# Patient Record
Sex: FEMALE | Race: WHITE | NOT HISPANIC OR LATINO | Employment: OTHER | URBAN - METROPOLITAN AREA
[De-identification: names, ages, dates, MRNs, and addresses within clinical notes are randomized per-mention and may not be internally consistent; named-entity substitution may affect disease eponyms.]

---

## 2017-10-17 ENCOUNTER — OFFICE VISIT (OUTPATIENT)
Dept: DERMATOLOGY | Facility: CLINIC | Age: 72
End: 2017-10-17

## 2017-10-17 DIAGNOSIS — L57.0 AK (ACTINIC KERATOSIS): Primary | ICD-10-CM

## 2017-10-17 DIAGNOSIS — L72.0 MILIUM CYST: ICD-10-CM

## 2017-10-17 RX ORDER — MULTIVITAMIN,THER AND MINERALS
TABLET ORAL
COMMUNITY
End: 2021-05-10

## 2017-10-17 ASSESSMENT — PAIN SCALES - GENERAL: PAINLEVEL: NO PAIN (0)

## 2017-10-17 NOTE — PROGRESS NOTES
Former[facility] Dermatology Note      Dermatology Problem List:  -AK's  -SK's    Encounter Date: Oct 17, 2017    CC:   Chief Complaint   Patient presents with     Derm Problem     Deb would like to have lesions of concern onher face checked, declines full skin check at this time.         History of Present Illness:  This 72 year old female presents as a follow-up for concerning lesion on her face. The patient was last seen 08/18/14 when she had two biopsies which showed actinic keratosis and macular seborrheic keratosis. Today she has a concern about a lesion on her left eyebrow.  States that she first noticed it about 5 months ago. It will get scaly and get bigger then it sloughs off. It is not painful but it is pruritic.  It does not bleed. She also has a bump on the right side of the back of her neck. She noticed it a couple months ago but it does not bother her. She has not other concerns. She is otherwise in good health.    Past Medical History:   Past Medical History:   Diagnosis Date     Arthritis      Cataract      Glaucoma      Meningioma (H)     Neurosurgeon in [facility]     Osseous obstruction of eustachian tube (aka CHOLESTEOTOMA)      Past Surgical History:   Procedure Laterality Date     APPENDECTOMY  1986     CATARACT IOL, RT/LT       HC EXCISION OF CHOLESTEATOMA, MIDDLE EAR  1982       Social History:  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    Family History:  There is a family history of melanoma in the patient's sister.    Medications:  Current Outpatient Prescriptions   Medication Sig Dispense Refill     calcium citrate-vitamin D (CITRACAL) 315-200 MG-UNIT TABS per tablet Reported on 4/28/2017       vitamin  s/Minerals TABS Reported on 4/28/2017       Acetaminophen (TYLENOL PO) Take 325 mg by mouth PRN       omeprazole (PRILOSEC) 20 MG CR capsule Take 1 capsule (20 mg) by mouth 2 times daily 60 capsule 3        Allergies   Allergen Reactions      Clindamycin Itching and Rash         Review of Systems:  -As per HPI  -Constitutional: The patient denies fatigue, fevers, chills, unintended weight loss, and night sweats.  -HEENT: Patient denies nonhealing oral sores.    Physical exam:  There were no vitals taken for this visit.  -This is a well developed, well-nourished female in no acute distress, in a pleasant mood.    -Focused examination of the face and neck was performed.  - There is an erythematous macule with overyling adherent scale on the left eyebrow.  -There is a white 1 mm papule on the right back of neck just in the hairline.   -No other lesions of concern on areas examined.     Impression/Plan:  1. Actinic keratosis: one spot on left eyebrow    Cryotherapy procedure note: After verbal consent and discussion of risks and benefits including but no limited to dyspigmentation/scar, blister, and pain, 1 was(were) treated with 1-2mm freeze border for 2 cycles with liquid nitrogen. Post cryotherapy instructions were provided.     2. Milium: one spot on the back of the neck. Patient would like this removed.    Expressed the cyst - after informed verbal consent, the lesion was swabbed with etoh pad, surface was nicked with a 30 gauge needle, and contents were gently expressed with cotton swabs.  Pt tolerated without complication.          Follow-up prn for new or changing lesions.         Staff Involved:  Mauri Jacobson, MS4 scribed for Dr. Sharp    The medical student acted as a scribe with respect to this patient.  I have performed all the key elements of the history and physical, as well as all procedures.    Christiano Sharp MD  Dermatology Attending

## 2017-10-17 NOTE — LETTER
10/17/2017       RE: Deb Nelson  [address] [facility] [address]     Dear Colleague,    Thank you for referring your patient, Deb Nelson, to the [facility] DERMATOLOGY at [facility]. Please see a copy of my visit note below.    [facility] Dermatology Note      Dermatology Problem List:  -AK's  -SK's    Encounter Date: Oct 17, 2017    CC:   Chief Complaint   Patient presents with     Derm Problem     Deb would like to have lesions of concern onher face checked, declines full skin check at this time.         History of Present Illness:  This 72 year old female presents as a follow-up for concerning lesion on her face. The patient was last seen 08/18/14 when she had two biopsies which showed actinic keratosis and macular seborrheic keratosis. Today she has a concern about a lesion on her left eyebrow.  States that she first noticed it about 5 months ago. It will get scaly and get bigger then it sloughs off. It is not painful but it is pruritic.  It does not bleed. She also has a bump on the right side of the back of her neck. She noticed it a couple months ago but it does not bother her. She has not other concerns. She is otherwise in good health.    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    Family History:  There is a family history of melanoma in the patient's sister.    Medications:  Current Outpatient Prescriptions   Medication Sig Dispense Refill     calcium citrate-vitamin D (CITRACAL) 315-200 MG-UNIT TABS per  tablet Reported on 4/28/2017       vitamin  s/Minerals TABS Reported on 4/28/2017       Acetaminophen (TYLENOL PO) Take 325 mg by mouth PRN       omeprazole (PRILOSEC) 20 MG CR capsule Take 1 capsule (20 mg) by mouth 2 times daily 60 capsule 3        Allergies   Allergen Reactions     Clindamycin Itching and Rash         Review of Systems:  -As per HPI  -Constitutional: The patient denies fatigue, fevers, chills, unintended weight loss, and night sweats.  -HEENT: Patient denies nonhealing oral sores.    Physical exam:  There were no vitals taken for this visit.  -This is a well developed, well-nourished female in no acute distress, in a pleasant mood.    -Focused examination of the face and neck was performed.  - There is an erythematous macule with overyling adherent scale on the left eyebrow.  -There is a white 1 mm papule on the right back of neck just in the hairline.   -No other lesions of concern on areas examined.     Impression/Plan:  1. Actinic keratosis: one spot on left eyebrow    Cryotherapy procedure note: After verbal consent and discussion of risks and benefits including but no limited to dyspigmentation/scar, blister, and pain, 1 was(were) treated with 1-2mm freeze border for 2 cycles with liquid nitrogen. Post cryotherapy instructions were provided.     2. Milium: one spot on the back of the neck. Patient would like this removed.    Expressed the cyst - after informed verbal consent, the lesion was swabbed with etoh pad, surface was nicked with a 30 gauge needle, and contents were gently expressed with cotton swabs.  Pt tolerated without complication.          Follow-up prn for new or changing lesions.         Staff Involved:  Mauri Jacobson, MS4 scribed for Dr. Sharp    The medical student acted as a scribe with respect to this patient.  I have performed all the key elements of the history and physical, as well as all procedures.    Christiano Sharp MD  Dermatology Attending

## 2017-10-17 NOTE — NURSING NOTE
Dermatology Rooming Note    Deb Nelson's goals for this visit include:   Chief Complaint   Patient presents with     Derm Problem     Deb would like to have lesions of concern onher face checked, declines full skin check at this time.     Nicolasa Monet LPN

## 2017-10-17 NOTE — MR AVS SNAPSHOT
After Visit Summary   10/17/2017    Deb Nelson    MRN: [id number]           Patient Information     Date Of Birth          1945        Visit Information        Provider Department      10/17/2017 8:30 AM Christiano Sharp MD [facility]        Today's Diagnoses     AK (actinic keratosis)    -  1    Milium cyst           Follow-ups after your visit        Who to contact     Please call your clinic at [phone] to:    Ask questions about your health    Make or cancel appointments    Discuss your medicines    Learn about your test results    Speak to your doctor   If you have compliments or concerns about an experience at your clinic, or if you wish to file a complaint, please contact [facility] Patient Relations at [phone] or email us at [email or web address]         Additional Information About Your Visit        MyChart Information     [employer] gives you secure access to your electronic health record. If you see a primary care provider, you can also send messages to your care team and make appointments. If you have questions, please call your primary care clinic.  If you do not have a primary care provider, please call [phone] and they will assist you.      [employer] is an electronic gateway that provides easy, online access to your medical records. With [employer], you can request a clinic appointment, read your test results, renew a prescription or communicate with your care team.     To access your existing account, please contact your [facility] or call [phone] for assistance.        Care EveryWhere ID     This is your Care EveryWhere ID. This could be used by other organizations to access your [place] medical records  [id number]         Blood Pressure from Last 3 Encounters:   12/23/16 115/84   11/02/16 147/83   12/23/15 127/74    Weight from Last 3 Encounters:   12/23/16 70.8 kg (156 lb)    11/02/16 67.8 kg (149 lb 6.4 oz)   12/23/15 69.6 kg (153 lb 6.4 oz)              We Performed the Following     DESTRUCT PREMALIGNANT LESION, FIRST        Primary Care Provider Office Phone # Fax #    Cassandra Goddard [phone]       [address] [address]        Equal Access to Services     LEIA MINOR : Hadii aad ku hadasho Soomaali, waaxda luqadaha, qaybta kaalmada adeegyada, waxay idiin hayaan adeeg kharash la'aan . So [facility] [phone].    ATENCIÓN: Si habla esptod, tiene a das disposición servicios gratuitos de asistencia lingüística. Shmuel al [phone].    We comply with applicable federal civil rights laws and [place] laws. We do not discriminate on the basis of race, color, national origin, age, disability, sex, sexual orientation, or gender identity.            Thank you!     Thank you for choosing [facility] DERMATOLOGY  for your care. Our goal is always to provide you with excellent care. Hearing back from our patients is one way we can continue to improve our services. Please take a few minutes to complete the written survey that you may receive in the mail after your visit with us. Thank you!             Your Updated Medication List - Protect others around you: Learn how to safely use, store and throw away your medicines at www.disposemymeds.org.          This list is accurate as of: 10/17/17 11:59 PM.  Always use your most recent med list.                   Brand Name Dispense Instructions for use Diagnosis    calcium citrate-vitamin D 315-200 MG-UNIT Tabs per tablet    CITRACAL     Reported on 4/28/2017        omeprazole 20 MG CR capsule    priLOSEC    60 capsule    Take 1 capsule (20 mg) by mouth 2 times daily    Dyspepsia       TYLENOL PO      Take 325 mg by mouth PRN        vitamin  s/Minerals Tabs      Reported on 4/28/2017

## 2017-10-20 PROBLEM — L72.0 MILIUM CYST: Status: ACTIVE | Noted: 2017-10-20

## 2017-10-20 PROBLEM — L57.0 AK (ACTINIC KERATOSIS): Status: ACTIVE | Noted: 2017-10-20

## 2017-11-20 DIAGNOSIS — R10.13 DYSPEPSIA: ICD-10-CM

## 2017-11-20 NOTE — TELEPHONE ENCOUNTER
Request for medication refill:    Date of last visit at clinic: 12/23/16    Please complete refill if appropriate and CLOSE ENCOUNTER.    Closing the encounter signifies the refill is complete.    If refill has been denied, please complete the smart phrase .smirefuse and route it to the [facility] RN TRIAGE pool to inform the patient and the pharmacy.    Diana Anderson

## 2017-11-24 NOTE — TELEPHONE ENCOUNTER
Request for medication refill:    Date of last visit at clinic: 12/23/2016    Please complete refill if appropriate and CLOSE ENCOUNTER.    Closing the encounter signifies the refill is complete.    If refill has been denied, please complete the smart phrase .smirefuse and route it to the [facility] RN TRIAGE pool to inform the patient and the pharmacy.    Austen Echeverria, CMA

## 2017-12-01 ENCOUNTER — OFFICE VISIT (OUTPATIENT)
Dept: FAMILY MEDICINE | Facility: CLINIC | Age: 72
End: 2017-12-01

## 2017-12-01 ENCOUNTER — OFFICE VISIT (OUTPATIENT)
Dept: PHARMACY | Facility: CLINIC | Age: 72
End: 2017-12-01

## 2017-12-01 VITALS
HEART RATE: 62 BPM | SYSTOLIC BLOOD PRESSURE: 143 MMHG | OXYGEN SATURATION: 98 % | TEMPERATURE: 97.8 F | RESPIRATION RATE: 16 BRPM | DIASTOLIC BLOOD PRESSURE: 87 MMHG

## 2017-12-01 DIAGNOSIS — L30.9 DERMATITIS: ICD-10-CM

## 2017-12-01 DIAGNOSIS — Z00.00 WELLNESS EXAMINATION: Primary | ICD-10-CM

## 2017-12-01 DIAGNOSIS — Z00.00 MEDICARE ANNUAL WELLNESS VISIT, SUBSEQUENT: ICD-10-CM

## 2017-12-01 DIAGNOSIS — Z00.00 PREVENTATIVE HEALTH CARE: Primary | ICD-10-CM

## 2017-12-01 RX ORDER — TRIAMCINOLONE ACETONIDE 1 MG/G
OINTMENT TOPICAL
Qty: 80 G | Refills: 0 | Status: SHIPPED | OUTPATIENT
Start: 2017-12-01 | End: 2019-08-28

## 2017-12-01 NOTE — MR AVS SNAPSHOT
After Visit Summary   12/1/2017    Deb Nelson    MRN: [id number]           Patient Information     Date Of Birth          1945        Visit Information        Provider Department      12/1/2017 9:20 AM Terrell Mijares MD [facility]        Today's Diagnoses     Dermatitis    -  1      Care Instructions    Take Allegra 180mg per day (check box) and use TMC ointment 3 times per day for 2 weeks.  If the rash is not improved then call us and we will refer to Derm.  Take Vitiman D 2000 IU daily during the winter.  Get a PCV-13 shot and a shingles shot.            Follow-ups after your visit        Who to contact     Please call your clinic at [phone] to:    Ask questions about your health    Make or cancel appointments    Discuss your medicines    Learn about your test results    Speak to your doctor   If you have compliments or concerns about an experience at your clinic, or if you wish to file a complaint, please contact [facility] Patient Relations at [phone] or email us at [email or web address]         Additional Information About Your Visit        MyChart Information     [employer] gives you secure access to your electronic health record. If you see a primary care provider, you can also send messages to your care team and make appointments. If you have questions, please call your primary care clinic.  If you do not have a primary care provider, please call [phone] and they will assist you.      [employer] is an electronic gateway that provides easy, online access to your medical records. With [employer], you can request a clinic appointment, read your test results, renew a prescription or communicate with your care team.     To access your existing account, please contact your [facility] or call [phone] for assistance.        Care EveryWhere ID     This is your Care EveryWhere ID. This could  be used by other organizations to access your [place] medical records  [id number]        Your Vitals Were     Pulse Temperature Respirations Pulse Oximetry          62 97.8  F (36.6  C) (Oral) 16 98%         Blood Pressure from Last 3 Encounters:   12/01/17 143/87   12/23/16 115/84   11/02/16 147/83    Weight from Last 3 Encounters:   12/23/16 70.8 kg (156 lb)   11/02/16 67.8 kg (149 lb 6.4 oz)   12/23/15 69.6 kg (153 lb 6.4 oz)              Today, you had the following     No orders found for display         Today's Medication Changes          These changes are accurate as of: 12/1/17  9:27 AM.  If you have any questions, ask your nurse or doctor.               Start taking these medicines.        Dose/Directions    triamcinolone 0.1 % ointment   Commonly known as:  KENALOG   Used for:  Dermatitis   Started by:  Terrell Mijares MD        Apply sparingly to affected area three times daily for 14 days.   Quantity:  80 g   Refills:  0            Where to get your medicines      These medications were sent to [facility]/pharmacy #[id number] - [place], [place] - [address], [place] [address]     Phone:  [phone]     triamcinolone 0.1 % ointment                Primary Care Provider Office Phone # Fax #    Cassandra Goddard [phone]       [address] [address]        Equal Access to Services     LEIA MINOR AH: Hadii analilia coloradoo Sobehzad, waaxda luqadaha, qaybta kaalmada franklin, waxangel yaa bustillo. So [facility] [phone].    ATENCIÓN: Si habla español, tiene a das disposición servicios gratuitos de asistencia lingüística. Llame al [phone].    We comply with applicable federal civil rights laws and [place] laws. We do not discriminate on the basis of race, color, national origin, age, disability, sex, sexual orientation, or gender identity.            Thank you!     Thank you for choosing [facility]  for your care. Our goal is always to provide you with excellent care. Hearing back from our patients is one way we can continue to improve our services. Please take a few minutes to complete the written survey that you may receive in the mail after your visit with us. Thank you!             Your Updated Medication List - Protect others around you: Learn how to safely use, store and throw away your medicines at www.disposemymeds.org.          This list is accurate as of: 12/1/17  9:27 AM.  Always use your most recent med list.                   Brand Name Dispense Instructions for use Diagnosis    calcium citrate-vitamin D 315-200 MG-UNIT Tabs per tablet    CITRACAL     Reported on 4/28/2017        omeprazole 20 MG CR capsule    priLOSEC    60 capsule    Take 1 capsule (20 mg) by mouth 2 times daily    Dyspepsia       triamcinolone 0.1 % ointment    KENALOG    80 g    Apply sparingly to affected area three times daily for 14 days.    Dermatitis       TYLENOL PO      Take 325 mg by mouth PRN        vitamin  s/Minerals Tabs      Reported on 4/28/2017

## 2017-12-01 NOTE — PROGRESS NOTES
Clinical Pharmacy Note     Deb was referred by Dr. Bradford for pharmacy services for [facility]        MEDICATION REVIEW:  Discussed all medication indications, dosage and effectiveness, adverse effects, and adherence with patient/caregiver.    Pt had meds with them: no  Pt had med list with them: no  Pt was knowledgeable about meds: yes  Medications set up by: self  Medications administered by someone else (e.g., LTC[facility]): No  Pt uses a medication box or automated dispenser: no  Called pharmacy to obtain or clarify med list:  no  Called [facility] or [facility] to obtain or clarify med list:  no    Medication Discrepancies  Medications on EMR med list that pt is NOT taking:  yes, pt is no longer taking calcium citrate-vitamin D nor is she taking multivitamin  Medications pt IS taking that are NOT on EMR med list (e.g., from specialist, hospital): none  OTC meds/ dietary supplements pt taking on own that are NOT on EMR med list:  none  Dosage listed differently than how patient is taking: none  Frequency listed differently than how patient is taking: none  Duplicate medication on list (two occurrences of the same medication):  none  TOTAL NUMBER OF MEDICATION DISCREPANCIES:  2  ______________________________________________________________________        Patient reported being compliant all of the time   Patient reports no side effects.    Subjective:  Deb is here today for a routine wellness visit and to be seen for possible chiggers. She reports no concerns with her medications at this time. 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          1)  Dyspepsia ;     Deb states she will use omeprazole as needed for a few weeks if she is having symptoms of acid reflux. She will then taper herself off of the medication. She reports last using the medication back in September 2017.     2) General Health ;     Deb reports no concerns with her general health at this time other  than discussing with the physician about a rash (possibly d/t chiggers) that has developed. She reports that she uses acetaminophen just as needed for aches and pains.    Objective:    Patient Active Problem List   Diagnosis     H/O senile osteopenia     Dyspepsia     History of cataract     Generalized osteoarthritis of multiple sites     History of meningioma of the brain     Recurrent cholesteatoma of postmastoidectomy cavity, unspecified laterality     AK (actinic keratosis)     Milium cyst       Social History   Substance Use Topics     Smoking status: Former Smoker     Packs/day: 2.00     Years: 25.00     Quit date: 7/28/1985     Smokeless tobacco: Never Used     Alcohol use 1.0 oz/week     2 Standard drinks or equivalent per week      Comment: 7 per week       Current Outpatient Prescriptions   Medication     omeprazole (PRILOSEC) 20 MG CR capsule     Acetaminophen (TYLENOL PO)     triamcinolone (KENALOG) 0.1 % ointment     calcium citrate-vitamin D (CITRACAL) 315-200 MG-UNIT TABS per tablet     vitamin  s/Minerals TABS     No current facility-administered medications for this visit.        Immunization History   Administered Date(s) Administered     HEPA 05/28/2009, 10/21/2009     HepB 05/28/2009, 10/21/2009, 04/13/2010     Influenza (High Dose) 3 valent vaccine 11/02/2016     Influenza (IIV3) PF 09/16/2013     Bruneian Encephalitis IM 07/02/2012, 07/27/2012     OPV, monovalent, unspecified 05/28/2009     Pneumococcal (PCV 13) 11/02/2016     Pneumococcal 23 valent 07/28/2010     TDAP Vaccine (Boostrix) 11/02/2016     Tdap (Adacel,Boostrix) 04/03/2006     Typhoid IM 05/28/2009     Zoster vaccine, live 09/16/2013       Current Outpatient Prescriptions   Medication     omeprazole (PRILOSEC) 20 MG CR capsule     Acetaminophen (TYLENOL PO)     triamcinolone (KENALOG) 0.1 % ointment     calcium citrate-vitamin D (CITRACAL) 315-200 MG-UNIT TABS per tablet     vitamin  s/Minerals TABS     No current  facility-administered medications for this visit.          Drug Therapy Assessment:  Drug therapy problems identified:   1.) Dyspepsia       Status:  controlled       DTP:  None     Deb appears very knowledgeable about the use of her omeprazole. She understands long-term use of the medication is not recommended due to increased risk of bone fractures. We discussed using alternative therapies such as ranitidine and calcium carbonate. The calcium carbonate would give her the added benefit of calcium supplementation as well. Although she does not have any concerns with her bone health at this time, we encouraged her to think about these options in the future.     2.) General Health    Deb should continue using acetaminophen as needed for treatment of aches and pains. Overall, she appears to have no concerns with her medications at this time.          All medications were reviewed and found to be indicated, effective, safe and convenient unless drug therapy problem identified as described above.        Drug Therapy Plan and Follow up:    Plan  1.) Continue using omeprazole 20 mg as needed for symptoms of dyspepsia. But, consider alternatives such as ranitidine, 75mg, 30 minutes before a meal as needed or calcium carbonate (Tums), 2-3 tablets at onset of symptoms.   2.) Continue using acetaminophen, 325 mg, as needed for general aches and pains.       Follow up: To be determined by PCP  Patient was provided with written instructions/medication list via provider AVS        Options for treatment and/or follow-up care were reviewed with the patient. Patient was engaged and actively involved in the decision making process.  Deb Nelson verbalized understanding of the options discussed and was satisfied with the final plan.      Dr. Terrell Mijares was provided my action  in clinic today and Dr. Mijares was available for supervision during this visit and is the authorizing prescriber for this visit through the  pharmacist collaborative practice agreement.      Note was scribed today for Dr. Shiv Gordon Pharmacy Student        Lilia Kaye [facility] student  Medical conditions reviewed: 1  Medications reviewed: 4  DTP identified: 0  Time spent: 15 minutes  Level of service:1        I was present for the entire pharmacy encounter today.    Pt was knowledgable of her medications and provided us an accurate picture of the meds she does and does not take.  Listed above.  I agree with the assessment and plan listed above.    Eron AngelD.

## 2017-12-01 NOTE — PROGRESS NOTES
Medicare Annual Wellness Visit         HPI     This 72 year old female presents as an established patient  Cassandra Goddard who presents for an subsequent Medicare Wellness Exam.  Patient also reports some itching of left chest in patches over the last several weeks since clearing brush in the area.  Using benedryl gel without success and also noticing some fullness in the skin ?lymph nodes in the area with no pain or burning.  No other areas of rash.  No vesicular and did not look like poison ivy.        Patient Active Problem List   Diagnosis     H/O senile osteopenia     Dyspepsia     History of cataract     Generalized osteoarthritis of multiple sites     History of meningioma of the brain     Recurrent cholesteatoma of postmastoidectomy cavity, unspecified laterality     AK (actinic keratosis)     Milium cyst       Past Medical History:   Diagnosis Date     Arthritis      Cataract      Glaucoma      Meningioma (H)     Neurosurgeon in [facility]     Osseous obstruction of eustachian tube (aka CHOLESTEOTOMA)         Family History   Problem Relation Age of Onset     Colon Cancer Mother      Breast Cancer Mother      CANCER Mother      Psychotic Disorder Other      mother, depression; father schizophrenia     Dementia Brother 82     Melanoma Sister      Skin Cancer No family hx of          Past Surgical History:   Procedure Laterality Date     APPENDECTOMY  1986     CATARACT IOL, RT/LT       HC EXCISION OF CHOLESTEATOMA, MIDDLE EAR  1982       Reviewed no other significant FH    Family History and past Medical History reviewed and it is unchanged/updated.       Review of Systems       Constitutional:   fevers, night sweats or unintentional weight change ?  NO      Eyes:   vision change, diplopia or red eyes?  NO      Ears, Nose, Mouth, Throat:   tinnitus or hearing change,  epistaxis or nasal discharge,  oral lesions, throat pain ?  NO      Neck:   stiffness?  NO           Cardiovascular:   chest pain, palpitations,  or pain with walking, orthopnea or PND?  NO   Breasts:  Any bumps or unusual discharge?     NO         Respiratory:   dyspnea, cough, shortness of breath or wheezing?  NO         GI:   nausea, vomiting, diarrhea or constipation,  abdominal pain ?  NO         :   change in urine,  dysuria or hematuria,  sexual dysfunction ?  NO        Musculoskeletal:   joint or muscle pain or swelling?  NO            Skin:   concerning lesions or moles?  NO           Nervous System:   loss of strength or sensation,  numbness or tingling,  tremor,  dizziness,  headache?  NO   Endocrine/Homone:   polyuria or polydipsia,  temperature intolerance?  NO            Blood and Lymphnodes:   concerning bumps,  bleeding problems?  NO            Allergy:   environmental allergies?  NO            Mental Health:   depression or anxiety,  sleep problems?  NO               Medical Care     Have you been to an ER or a hospital in the last year? No  What other specialists or organizations are involved in your medical care?  no  Current providers sharing in care for this patient include:  Patient Care Team:  Cassandra Goddard MD as PCP - General (Student in organized health care education/training program)         Social History     Social History   Substance Use Topics     Smoking status: Former Smoker     Packs/day: 2.00     Years: 25.00     Quit date: 7/28/1985     Smokeless tobacco: Never Used     Alcohol use 1.0 oz/week     2 Standard drinks or equivalent per week      Comment: 7 per week     Marital Status:Partnered  Who lives in your household? wife  Does your home have any of the following safety concerns? Loose rugs in the hallway, no grab bars in the bathroom, no handrails on the stairs or have poorly lit areas?  No  Do you feel threatened or controlled by a partner, ex-partner or anyone in your life? No  Has anyone hurt you physically, for example by pushing, hitting, slapping or kicking you   or forcing you to have sex? No  Do you need  help with the phone, transportation, shopping, preparing meals, housework, laundry, medications or managing money? No   Have you noticed any hearing difficulties? No      Risk Behaviors and Healthy Habits     How many servings of fruits and vegetables do you eat a day? several  How often do you exercise and what do you do? 30 minutes 3x a week  Do you frequently ride without a seatbelt? No  Do you use tobacco?  No  Do you use any other drugs? No         Do you use alcohol?Yes  Number of drinks per day 2  Number of drinking days a week 5      Today's PHQ-2 Score: 0    Sexual Health     Are you sexually active?  Yes    If yes, with men, women, or both? Female  If yes, do you more than one current partner?No  If yes, are you using condoms?  No  Have you had any sexually transmitted infections? No   Any sexual concerns? No     FOR WOMEN  What year did you stop having periods? Years ago  Any vaginal bleeding in the last year? No  Have you ever had an abnormal Pap smear? No    FUNCTIONAL ABILITY/SAFETY SCREENING     Fall Risk Assessment Today: Fallen 2 or more times in the past year?: No  Any fall with injury in the past year?: Yes  Timed Up and Go Test (>13.5 is fall risk; contact physician) : 9    Hearing evaluation if done: No    EVALUATION OF COGNITIVE FUNCTION     Mood/affect:Normal  Appearance:Normal  Family member/caregiver input: none    Mini Cog Scoring   5 points   Clock Draw Test result:  Normal      SCREENING FOR PREVENTION and EARLY DETECTION     ECG (if done)not performed    Corrected Visual acuity: L 20/20   R 20/20    CV Risk based on Pooled Cohort Risk:  The ASCVD Risk score (Kimberlybing COHEN Jr, et al., 2013) failed to calculate for the following reasons:    Cannot find a previous HDL lab    Cannot find a previous total cholesterol lab  7.5% or so    Advanced Directives: Discussed and patient desires to not addressed at this visit.      Immunization History   Administered Date(s) Administered     HEPA 05/28/2009,  10/21/2009     HepB 05/28/2009, 10/21/2009, 04/13/2010     Influenza (High Dose) 3 valent vaccine 11/02/2016     Influenza (IIV3) PF 09/16/2013     Nigerien Encephalitis IM 07/02/2012, 07/27/2012     OPV, monovalent, unspecified 05/28/2009     Pneumococcal (PCV 13) 11/02/2016     Pneumococcal 23 valent 07/28/2010     TDAP Vaccine (Boostrix) 11/02/2016     Tdap (Adacel,Boostrix) 04/03/2006     Typhoid IM 05/28/2009     Zoster vaccine, live 09/16/2013       Reviewed Immunization Record Today  Pneumoccocal Vaccine: Yes  Varicella Vaccine: Yes  TDaP:Yes         Physical Exam     Vitals: /87  Pulse 62  Temp 97.8  F (36.6  C) (Oral)  Resp 16  SpO2 98%  BMI= There is no height or weight on file to calculate BMI.  GENERAL APPEARANCE: healthy, alert and no distress  EYES: Eyes grossly normal to inspection, PERRL and conjunctivae and sclerae normal  NECK: symmetrical neck but has some fullness on the left close to rash  MS: no musculoskeletal defects are noted and gait is age appropriate without ataxia  SKIN: no suspicious lesions or rashes with exception of some erythematous patches on left neck  NEURO: Normal strength and tone, sensory exam grossly normal, mentation intact and speech normal  PSYCH: mentation appears normal and affect normal/bright        Assessment and Plan   subsequent   Medicare Wellness Exam  1. Health Care Maintenance: Normal Physical Exam  2. Dermatitis  PLAN:  1. Pt will have PCV-13 and Shingles shot done at pharmacy   2. Will try TMC oint and allegra and will return if not improving.      Deb was seen today for wellness visit.    Diagnoses and all orders for this visit:    Wellness examination  -     SCREENING TEST, PURE TONE, AIR ONLY  -     SCREENING, VISUAL ACUITY, QUANTITATIVE, BILAT    Dermatitis  -     triamcinolone (KENALOG) 0.1 % ointment; Apply sparingly to affected area three times daily for 14 days.          Options for treatment and follow-up care were reviewed with the Deb LAY  Barnsteiner and/or guardian engaged in the decision making process and verbalized understanding of the options discussed and agreed with the final plan.    JEAN CLAUDE CAI

## 2017-12-01 NOTE — Clinical Note
I have completed my note for NASIMA. Please let me know if you have any other additional recommendations on how to improve my note.  Thanks,  Lilia Gordon Student Pharmacist

## 2017-12-01 NOTE — PATIENT INSTRUCTIONS
Take Allegra 180mg per day (check box) and use TMC ointment 3 times per day for 2 weeks.  If the rash is not improved then call us and we will refer to Derm.  Take Vitiman D 2000 IU daily during the winter.  Get a PCV-13 shot and a shingles shot.    Preventive Health Recommendations    Female Ages 65 +    Yearly exam:     See your health care provider every year in order to  o Review health changes.   o Discuss preventive care.    o Review your medicines if your doctor has prescribed any.      You no longer need a yearly Pap test unless you've had an abnormal Pap test in the past 10 years. If you have vaginal symptoms, such as bleeding or discharge, be sure to talk with your provider about a Pap test.      Every 1 to 2 years, have a mammogram.  If you are over 69, talk with your health care provider about whether or not you want to continue having screening mammograms.      Every 10 years, have a colonoscopy. Or, have a yearly FIT test (stool test). These exams will check for colon cancer.       Have a cholesterol test every 5 years, or more often if your doctor advises it.       Have a diabetes test (fasting glucose) every three years. If you are at risk for diabetes, you should have this test more often.       At age 65, have a bone density scan (DEXA) to check for osteoporosis (brittle bone disease).    Shots:    Get a flu shot each year.    Get a tetanus shot every 10 years.    Talk to your doctor about your pneumonia vaccines. There are now two you should receive - Pneumovax (PPSV 23) and Prevnar (PCV 13).    Talk to your doctor about the shingles vaccine.    Talk to your doctor about the hepatitis B vaccine.    Nutrition:     Eat at least 5 servings of fruits and vegetables each day.      Eat whole-grain bread, whole-wheat pasta and brown rice instead of white grains and rice.      Talk to your provider about Calcium and Vitamin D.     Lifestyle    Exercise at least 150 minutes a week (30 minutes a day, 5 days  a week). This will help you control your weight and prevent disease.      Limit alcohol to one drink per day.      No smoking.       Wear sunscreen to prevent skin cancer.       See your dentist twice a year for an exam and cleaning.      See your eye doctor every 1 to 2 years to screen for conditions such as glaucoma, macular degeneration and cataracts.

## 2017-12-01 NOTE — NURSING NOTE
I have performed the following screening on patient Deb Nelson:    Vision: R eye 20/20   L eye 20/20  corrective lenses: Yes  Hearing: R Ear: FAILED   L Ear: FAILED    Sees audiology for hearing loss, follow up appt is in 12/2017      TUG 9 seconds    Angie Lindsay CMA

## 2017-12-01 NOTE — MR AVS SNAPSHOT
After Visit Summary   12/1/2017    Deb Nelson    MRN: [id number]           Patient Information     Date Of Birth          1945        Visit Information        Provider Department      12/1/2017 9:00 AM [facility]        Today's [facility] health care    -  1       Follow-ups after your visit        Who to contact     Please call your clinic at [phone] to:    Ask questions about your health    Make or cancel appointments    Discuss your medicines    Learn about your test results    Speak to your doctor   If you have compliments or concerns about an experience at your clinic, or if you wish to file a complaint, please contact [facility] Patient Relations at [phone] or email us at [email or web address]         Additional Information About Your Visit        MyChart Information     [employer] gives you secure access to your electronic health record. If you see a primary care provider, you can also send messages to your care team and make appointments. If you have questions, please call your primary care clinic.  If you do not have a primary care provider, please call [phone] and they will assist you.      [employer] is an electronic gateway that provides easy, online access to your medical records. With [employer], you can request a clinic appointment, read your test results, renew a prescription or communicate with your care team.     To access your existing account, please contact your [facility] or call [phone] for assistance.        Care EveryWhere ID     This is your Care EveryWhere ID. This could be used by other organizations to access your [facility] medical records  [id number]         Blood Pressure from Last 3 Encounters:   12/01/17 143/87   12/23/16 115/84   11/02/16 147/83    Weight from Last 3 Encounters:   12/23/16 156 lb (70.8 kg)   11/02/16 149  lb 6.4 oz (67.8 kg)   12/23/15 153 lb 6.4 oz (69.6 kg)              Today, you had the following     No orders found for display         Today's Medication Changes          These changes are accurate as of: 12/1/17 11:59 PM.  If you have any questions, ask your nurse or doctor.               Start taking these medicines.        Dose/Directions    triamcinolone 0.1 % ointment   Commonly known as:  KENALOG   Used for:  Dermatitis   Started by:  Terrell Mijares MD        Apply sparingly to affected area three times daily for 14 days.   Quantity:  80 g   Refills:  0            Where to get your medicines      These medications were sent to [facility]/pharmacy #[id number] - [place], [place] - [address] [facility]  [address], [place] [address]     Phone:  [phone]     triamcinolone 0.1 % ointment                Primary Care Provider Office Phone # Fax #    Cassandra Goddard [phone]       [address] [address]        Equal Access to Services     [facility]: Hadii aad ku hadasho Soomaali, waaxda luqadaha, qaybta kaalmada adeegyada, waxangel mojica . So [facility] [phone].    ATENCIÓN: Si habla español, tiene a das disposición servicios gratuitos de asistencia lingüística. Llame al [phone].    We comply with applicable federal civil rights laws and [place] laws. We do not discriminate on the basis of race, color, national origin, age, disability, sex, sexual orientation, or gender identity.            Thank you!     Thank you for choosing [facility]  for your care. Our goal is always to provide you with excellent care. Hearing back from our patients is one way we can continue to improve our services. Please take a few minutes to complete the written survey that you may receive in the mail after your visit with us. Thank you!             Your Updated Medication List - Protect others around you: Learn how to  safely use, store and throw away your medicines at www.disposemymeds.org.          This list is accurate as of: 12/1/17 11:59 PM.  Always use your most recent med list.                   Brand Name Dispense Instructions for use Diagnosis    calcium citrate-vitamin D 315-200 MG-UNIT Tabs per tablet    CITRACAL     Reported on 4/28/2017        omeprazole 20 MG CR capsule    priLOSEC    60 capsule    Take 1 capsule (20 mg) by mouth 2 times daily    Dyspepsia       triamcinolone 0.1 % ointment    KENALOG    80 g    Apply sparingly to affected area three times daily for 14 days.    Dermatitis       TYLENOL PO      Take 325 mg by mouth PRN        vitamin  s/Minerals Tabs      Reported on 4/28/2017

## 2018-09-10 ENCOUNTER — TELEPHONE (OUTPATIENT)
Dept: DERMATOLOGY | Facility: CLINIC | Age: 73
End: 2018-09-10

## 2018-09-10 NOTE — TELEPHONE ENCOUNTER
[facility] Call Center    Phone Message    May a detailed message be left on voicemail: yes    Reason for Call: Other: PT would like a call back to schedule an appt. for changing lesion.  Please follow up with the PT.      Action Taken: Message routed to:  [facility] derm

## 2019-08-28 ENCOUNTER — OFFICE VISIT (OUTPATIENT)
Dept: FAMILY MEDICINE | Facility: CLINIC | Age: 74
End: 2019-08-28
Payer: MEDICARE

## 2019-08-28 VITALS
HEIGHT: 66 IN | HEART RATE: 57 BPM | OXYGEN SATURATION: 96 % | TEMPERATURE: 98.2 F | SYSTOLIC BLOOD PRESSURE: 114 MMHG | WEIGHT: 150.6 LBS | DIASTOLIC BLOOD PRESSURE: 67 MMHG | BODY MASS INDEX: 24.2 KG/M2

## 2019-08-28 DIAGNOSIS — Z01.818 PREOP GENERAL PHYSICAL EXAM: Primary | ICD-10-CM

## 2019-08-28 ASSESSMENT — MIFFLIN-ST. JEOR: SCORE: 1203.84

## 2019-08-28 NOTE — PROGRESS NOTES
Preceptor Attestation:   Patient seen, evaluated and discussed with the resident. I have verified the content of the note, which accurately reflects my assessment of the patient and the plan of care.   Supervising Physician:  Shira Crespo MD MD

## 2019-08-28 NOTE — PROGRESS NOTES
[facility]  [address],  [address]  [place] [address]  Phone: [phone]  Fax: [phone]    8/28/2019    Adult PRE-OP Evaluation:    Deb Nelson, 1945 presents for pre-operative evaluation and assessment as requested by Dr. Nasrin Franco, prior to undergoing surgery/procedure for treatment of  Cataract .    Proposed procedure: Cataract    Date of Surgery/ Procedure: 9/3/19  Hospital/Surgical Facility: [facility]     Primary Physician: Cassandra Goddard  Type of Anesthesia Anticipated: General  History of anesthesia complications: NONE  History of  abnormal bleeding: NONE   History of blood transfusions: NO  Patient has a Health Care Directive or Living Will:  YES     Preoperative Questions   1. NO - Do you have a history of heart attack, stroke, stent, bypass or surgery on an artery in the head, neck, heart or legs?  2. NO - Do you ever have any pain or discomfort in your chest?  3. NO - Have you ever had a severe pain across the front of your chest lasting for half an hour or more?  4. NO - Do you have a history of Congestive Heart Failure?  5. NO - Are you troubled by shortness of breath when: walking on the level/ up a slight hill/ at night?  6. NO - Does your chest ever sound wheezy or whistling?  7. NO - Do you currently have a cold, bronchitis or other respiratory infection?  8. NO - Have you had a cold, bronchitis or other respiratory infection within the last 2 weeks?  9. NO - Do you usually have a cough?  10. NO - Do you sometimes get pains in the calves of your legs when you walk?  11. NO - Do you or anyone in your family have previous history of blood clots?  12. NO - Do you or does anyone in your family have a serious bleeding problem such as prolonged bleeding following surgeries or cuts?  13. NO - Have you ever had problems with anemia or been told to take iron pills?  14. NO - Have you had any abnormal blood loss such as black, tarry or  bloody stools, or abnormal vaginal bleeding?  15. NO - Have you ever had a blood transfusion?  16. NO - Have you or any of your relatives ever had problems with anesthesia?  17. NO - Do you have sleep apnea, excessive snoring or daytime drowsiness?  18. NO - Do you have any prosthetic heart valves?  19. NO - Do you have prosthetic joints?  20. NO - Is there any chance that you may be pregnant?    Patient Active Problem List   Diagnosis     H/O senile osteopenia     Dyspepsia     History of cataract     Generalized osteoarthritis of multiple sites     History of meningioma of the brain     Recurrent cholesteatoma of postmastoidectomy cavity, unspecified laterality     AK (actinic keratosis)     Milium cyst         Current Outpatient Medications on File Prior to Visit:  Acetaminophen (TYLENOL PO) Take 325 mg by mouth PRN   calcium citrate-vitamin D (CITRACAL) 315-200 MG-UNIT TABS per tablet Reported on 2017   vitamin  s/Minerals TABS Reported on 2017     No current facility-administered medications on file prior to visit.     OTC products: None, except as noted above    Allergies   Allergen Reactions     Clindamycin Itching and Rash     Latex Allergy: NO    Social History     Socioeconomic History     Marital status: Single     Spouse name: None     Number of children: None     Years of education: None     Highest education level: None   Occupational History     None   Social Needs     Financial resource strain: None     Food insecurity:     Worry: None     Inability: None     Transportation needs:     Medical: None     Non-medical: None   Tobacco Use     Smoking status: Former Smoker     Packs/day: 2.00     Years: 25.00     Pack years: 50.00     Last attempt to quit: 1985     Years since quittin.1     Smokeless tobacco: Never Used   Substance and Sexual Activity     Alcohol use: Yes     Alcohol/week: 1.0 oz     Types: 2 Standard drinks or equivalent per week     Comment: 7 per week     Drug use:  "No     Sexual activity: Yes     Partners: Female   Lifestyle     Physical activity:     Days per week: None     Minutes per session: None     Stress: None   Relationships     Social connections:     Talks on phone: None     Gets together: None     Attends Scientologist service: None     Active member of club or organization: None     Attends meetings of clubs or organizations: None     Relationship status: None     Intimate partner violence:     Fear of current or ex partner: None     Emotionally abused: None     Physically abused: None     Forced sexual activity: None   Other Topics Concern     Parent/sibling w/ CABG, MI or angioplasty before 65F 55M? 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    EXAM:     Patient Vitals for the past 24 hrs:   BP Temp Temp src Pulse SpO2 Height Weight   08/28/19 1002 114/67 98.2  F (36.8  C) Oral 57 96 % 1.683 m (5' 6.25\") 68.3 kg (150 lb 9.6 oz)     Body mass index is 24.12 kg/m .  GENERAL: healthy, alert and no distress  EYES: Eyes grossly normal to inspection, extraocular movements - intact, and PERRL  HENT: ear canals- normal; TMs- normal; Nose- normal; Mouth- no ulcers, no lesions  NECK: no tenderness, no adenopathy, no asymmetry, no masses, no stiffness; thyroid- normal to palpation  RESP: lungs clear to auscultation - no rales, no rhonchi, no wheezes  CV: regular rates and rhythm, normal S1 S2, no S3 or S4 and no murmur, no click or rub -  ABDOMEN: soft, no tenderness, no  hepatosplenomegaly, no masses, normal bowel sounds  MS: extremities- no gross deformities noted, no edema  SKIN: no suspicious lesions, no rashes  NEURO: strength and tone- normal, sensory exam- grossly normal, mentation- intact, speech- normal, reflexes- symmetric  BACK: " no CVA tenderness, no paralumbar tenderness  PSYCH: Alert and oriented times 3; speech- coherent , normal rate and volume; able to articulate logical thoughts  LYMPHATICS: ant. cervical- normal, post. cervical- normal    DIAGNOSTICS:      No labs or EKG required for low risk surgery (cataract, skin procedure, breast biopsy, etc)    RISK ASSESSMENT:     Cardiovascular Risk:  -Patient is able to participate in strenuous activities without chest pain.  -The patient does not have chest pain with exertion.  -Patient does not have a history of congestive heart failure.    -The patient does not have a history of stroke and does not have a history of valvular disease.    Pulmonary Risk:  -In terms of risk factors for pulmonary complication, the patient has no risk factors    Perioperative Complications:  -The patient does not have a history of bleeding or clotting problems in the past.    -The patient has not had complications from surgeries.    -The patient does not have a family history of any anesthesia or surgical complications.      IMPRESSION:   Reason for surgery/procedure: Cataract    The proposed surgical procedure is considered LOW risk.    For above listed surgery and anesthesia:   Patient is at low risk for surgery/procedure and perioperative/procedure complications.    RECOMMENDATIONS:     Labs:  None needed    Fasting:  NPO for 12 hours prior to surgery    Preop Plan:  --Approval given to proceed with proposed procedure, without further diagnostic evaluation    Medications:  Patient should take their regular medications the morning of surgery unless otherwise instructed.          Christiano Maradiaga, DO    Please contact our office if there are any further questions or information required about this patient.

## 2019-10-05 ENCOUNTER — HEALTH MAINTENANCE LETTER (OUTPATIENT)
Age: 74
End: 2019-10-05

## 2020-02-10 ENCOUNTER — HEALTH MAINTENANCE LETTER (OUTPATIENT)
Age: 75
End: 2020-02-10

## 2020-07-27 ENCOUNTER — OFFICE VISIT (OUTPATIENT)
Dept: FAMILY MEDICINE | Facility: CLINIC | Age: 75
End: 2020-07-27
Payer: MEDICARE

## 2020-07-27 VITALS
DIASTOLIC BLOOD PRESSURE: 87 MMHG | HEART RATE: 54 BPM | HEIGHT: 66 IN | WEIGHT: 149.3 LBS | TEMPERATURE: 98.2 F | BODY MASS INDEX: 23.99 KG/M2 | SYSTOLIC BLOOD PRESSURE: 138 MMHG | OXYGEN SATURATION: 98 %

## 2020-07-27 DIAGNOSIS — D22.9 CHANGE IN COLOR OF SKIN MOLE: ICD-10-CM

## 2020-07-27 DIAGNOSIS — Z00.00 MEDICARE ANNUAL WELLNESS VISIT, SUBSEQUENT: Primary | ICD-10-CM

## 2020-07-27 ASSESSMENT — MIFFLIN-ST. JEOR: SCORE: 1192.15

## 2020-07-27 NOTE — PROGRESS NOTES
Deb Nelson is a 75 year old female who presents today to establish care and to do her annual medicare exam.  She has received some episodic care at [facility] but her main primary care has been in [place].  She lives with her wife in [place] and wants to have care available to her here.  Her records are available from Care Everywhere.    SH:  Works 50% time from home, enjoys her work.  She has a health care directive.  Nutrition:  Eats well, fruits, vegetables.    Exercise:  She gets 10,000 steps/day walking  Wears seatbelts at all times, no guns in the household, sees a dentist    Deb was on Fosamax for osteopenia years ago, she did very well, her last DEXA was in 2014.  She has been using TUMS for her calcium.      She has a FH of atrial fibrillation, CVA's, she is somewhat concerned about that. She had a full cardiac work up in [place] 18 months ago, everything was normal.  She has no symptoms of concern.    Review Of Systems  Skin: pigmentation, freckles, sun spots  Eyes: glasses  Ears/Nose/Throat: positive for cholesteatoma with 50% hearing loss in her right ear, 1983, she has had debridement 2 times/year  Respiratory: No shortness of breath, dyspnea on exertion, cough, or hemoptysis  Cardiovascular: negative  Gastrointestinal: reflux  Genitourinary: negative  Musculoskeletal: positive for osteopenia, used Fosamax with good results  Neurologic: history of meningioma, had falls associated with that.  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[employer] [facility]     Osseous obstruction of eustachian tube (aka CHOLESTEOTOMA)      Past Surgical History:   Procedure Laterality Date     APPENDECTOMY  1986     CATARACT IOL, RT/LT       HC EXCISION OF CHOLESTEATOMA, MIDDLE EAR  1982     Social History     Socioeconomic History      Marital status: Single     Spouse name: Not on file     Number of children: Not on file     Years of education: Not on file     Highest education level: Not on file   Occupational History     Not on file   Social Needs     Financial resource strain: Not on file     Food insecurity     Worry: Not on file     Inability: Not on file     Transportation needs     Medical: Not on file     Non-medical: Not on file   Tobacco Use     Smoking status: Former Smoker     Packs/day: 2.00     Years: 25.00     Pack years: 50.00     Last attempt to quit: 1985     Years since quittin.0     Smokeless tobacco: Never Used   Substance and Sexual Activity     Alcohol use: Yes     Alcohol/week: 1.7 standard drinks     Types: 2 Standard drinks or equivalent per week     Comment: 7 per week     Drug use: No     Sexual activity: Yes     Partners: Female   Lifestyle     Physical activity     Days per week: Not on file     Minutes per session: Not on file     Stress: Not on file   Relationships     Social connections     Talks on phone: Not on file     Gets together: Not on file     Attends Judaism service: Not on file     Active member of club or organization: Not on file     Attends meetings of clubs or organizations: Not on file     Relationship status: Not on file     Intimate partner violence     Fear of current or ex partner: Not on file     Emotionally abused: Not on file     Physically abused: Not on file     Forced sexual activity: Not on file   Other Topics Concern     Parent/sibling w/ CABG, MI or angioplasty before 65F 55M? 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 No masses, lesions, tenderness or abnormalities  Neck: Neck supple. No adenopathy. Thyroid symmetric, normal size,, Carotids without bruits.  ENT: ENT exam normal, no neck nodes or sinus tenderness  Cardiovascular: negative, PMI normal. No lifts, heaves, or thrills. RRR. No murmurs, clicks gallops or rub  Respiratory: negative, Percussion normal. Good diaphragmatic excursion. Lungs clear  Gastrointestinal: Abdomen soft, non-tender. BS normal. No masses, organomegaly  : Deferred  Musculoskeletal: extremities normal- no gross deformities noted, gait normal and normal muscle tone  Skin: no suspicious lesions or rashes, freckles  Neurologic: Gait normal. Reflexes normal and symmetric. Sensation grossly WNL.  Psychiatric: mentation appears normal and affect normal/bright  Hematologic/Lymphatic/Immunologic: Normal cervical lymph nodes    Assessment/Plan:  1. Medicare annual wellness visit, subsequent    - DX Hip/Pelvis/Spine (DEXA); Future    3. Change in color of skin mole, skin exposure, freckles    - DERMATOLOGY REFERRAL      Options for treatment and follow-up care were reviewed with the patient. Patient engaged in the decision making process and verbalized understanding of the options discussed and agreed with the final plan.    "

## 2020-07-27 NOTE — NURSING NOTE
"75 year old  Chief Complaint   Patient presents with     Wellness Visit       Blood pressure (!) 162/91, pulse 54, temperature 98.2  F (36.8  C), temperature source Oral, height 1.681 m (5' 6.2\"), weight 67.7 kg (149 lb 4.8 oz), SpO2 98 %, not currently breastfeeding. Body mass index is 23.95 kg/m .  BP completed using cuff size:    Patient Active Problem List   Diagnosis     H/O senile osteopenia     Dyspepsia     History of cataract     Generalized osteoarthritis of multiple sites     History of meningioma of the brain     Recurrent cholesteatoma of postmastoidectomy cavity, unspecified laterality     AK (actinic keratosis)     Milium cyst       Wt Readings from Last 2 Encounters:   20 67.7 kg (149 lb 4.8 oz)   19 68.3 kg (150 lb 9.6 oz)     BP Readings from Last 3 Encounters:   20 (!) 162/91   19 114/67   17 143/87       Allergies   Allergen Reactions     Clindamycin Itching and Rash       Current Outpatient Medications   Medication     Acetaminophen (TYLENOL PO)     calcium citrate-vitamin D (CITRACAL) 315-200 MG-UNIT TABS per tablet     vitamin  s/Minerals TABS     No current facility-administered medications for this visit.        Social History     Tobacco Use     Smoking status: Former Smoker     Packs/day: 2.00     Years: 25.00     Pack years: 50.00     Last attempt to quit: 1985     Years since quittin.0     Smokeless tobacco: Never Used   Substance Use Topics     Alcohol use: Yes     Alcohol/week: 1.7 standard drinks     Types: 2 Standard drinks or equivalent per week     Comment: 7 per week     Drug use: No       Honoring Choices - Health Care Directive Guide offered to patient at time of visit.    Health Maintenance Due   Topic Date Due     HEPATITIS C SCREENING  1945     ADVANCE CARE PLANNING  1945     ZOSTER IMMUNIZATION (2 of 3) 2013     MAMMO SCREENING  2017     MEDICARE ANNUAL WELLNESS VISIT  2018     FALL RISK ASSESSMENT  " 12/01/2018     LIPID  08/18/2019       Immunization History   Administered Date(s) Administered     HEPA 05/28/2009, 10/21/2009     HepB 05/28/2009, 10/21/2009, 04/13/2010     Influenza (High Dose) 3 valent vaccine 11/02/2016     Influenza (IIV3) PF 09/16/2013     Welsh Encephalitis IM 07/02/2012, 07/27/2012     OPV, monovalent, unspecified 05/28/2009     Pneumo Conj 13-V (2010&after) 11/02/2016     Pneumococcal 23 valent 07/28/2010     TDAP Vaccine (Boostrix) 11/02/2016     Tdap (Adacel,Boostrix) 04/03/2006     Typhoid IM 05/28/2009     Zoster vaccine, live 09/16/2013       No results found for: PAP      Recent Labs   Lab Test 08/18/14  1236   LDL 81      TRIG 45   ALT 21   CR 0.76   GFRESTIMATED 76   GFRESTBLACK >90   GFR Calc     ALBUMIN 4.0   POTASSIUM 4.5       PHQ-2 ( 1999 Pfizer) 8/28/2019 12/1/2017   Q1: Little interest or pleasure in doing things 0 0   Q2: Feeling down, depressed or hopeless 0 0   PHQ-2 Score 0 0       PHQ-9 SCORE 7/21/2020   PHQ-9 Total Score MyChart 0   PHQ-9 Total Score 0       OPAL-7 SCORE 7/21/2020   Total Score 4 (minimal anxiety)   Total Score 4       No flowsheet data found.      Lisa Trevino [facility]  July 27, 2020 7:40 AM

## 2020-07-30 ENCOUNTER — DOCUMENTATION ONLY (OUTPATIENT)
Dept: CARE COORDINATION | Facility: CLINIC | Age: 75
End: 2020-07-30

## 2020-08-10 ENCOUNTER — OFFICE VISIT (OUTPATIENT)
Dept: DERMATOLOGY | Facility: CLINIC | Age: 75
End: 2020-08-10
Payer: MEDICARE

## 2020-08-10 ENCOUNTER — ANCILLARY PROCEDURE (OUTPATIENT)
Dept: BONE DENSITY | Facility: CLINIC | Age: 75
End: 2020-08-10
Attending: NURSE PRACTITIONER
Payer: MEDICARE

## 2020-08-10 DIAGNOSIS — L81.4 LENTIGINES: ICD-10-CM

## 2020-08-10 DIAGNOSIS — L57.0 ACTINIC KERATOSIS: Primary | ICD-10-CM

## 2020-08-10 DIAGNOSIS — Z12.83 SKIN CANCER SCREENING: ICD-10-CM

## 2020-08-10 DIAGNOSIS — L60.8 LONGITUDINAL MELANONYCHIA: ICD-10-CM

## 2020-08-10 ASSESSMENT — PAIN SCALES - GENERAL: PAINLEVEL: NO PAIN (0)

## 2020-08-10 NOTE — PROGRESS NOTES
[facility] Dermatology Note      Dermatology Problem List:  -AK's  -SK's  -Fhx melanoma (sister)  -*Longitudinal melanonychia - R great toenail - 3mm width, monitor for changes    CC:   Chief Complaint   Patient presents with     Skin Check     Deb is here today for a full skin exam. She has some concerns on her right large toenail.       Encounter Date: Aug 10, 2020    History of Present Illness:  Ms. Deb Nelson is a 75 year old female who presents for a FBSE. She has no known hx of skin cancer. She notes some concern over her right great toenail. Last seen in 2017 with Dr. Sharp for a spot check. Says he has not had toenail polish off her nails in 20-30 years, but now she has been leaving them natural and noticed a brown streak through the R great toe. No other associated sx. Fhx melanoma (sister). No personal hx of skin cancer. She otherwise has a hx of AKs which have been treated in the past. No other concerns today.    Past Medical History:   Patient Active Problem List   Diagnosis     H/O senile osteopenia     Dyspepsia     History of cataract     Generalized osteoarthritis of multiple sites     History of meningioma of the brain     Recurrent cholesteatoma of postmastoidectomy cavity, unspecified laterality     AK (actinic keratosis)     Milium cyst     Past Medical History:   Diagnosis Date     Arthritis      Cataract      Glaucoma      Meningioma (H)     Neurosurgeon in [facility]     Osseous obstruction of eustachian tube (aka CHOLESTEOTOMA)      Past Surgical History:   Procedure Laterality Date     APPENDECTOMY  1986     CATARACT IOL, RT/LT       HC EXCISION OF CHOLESTEATOMA, MIDDLE EAR  1982       Social History:  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    Family History:  There is a family history of melanoma in the patient's sister.    Medications:  Current Outpatient Medications   Medication Sig Dispense Refill     Acetaminophen (TYLENOL PO)  Take 325 mg by mouth PRN       calcium citrate-vitamin D (CITRACAL) 315-200 MG-UNIT TABS per tablet Reported on 4/28/2017       vitamin  s/Minerals TABS Reported on 4/28/2017       Allergies   Allergen Reactions     Clindamycin Itching and Rash     Review of Systems:  -Constitutional: The patient denies fatigue, fevers, chills, unintended weight loss, and night sweats.  -HEENT: Patient denies nonhealing oral sores.  -Skin: As above in HPI. No additional skin concerns.    Physical exam:  Vitals: There were no vitals taken for this visit.  GEN: This is a well developed, well-nourished female in no acute distress, in a pleasant mood.    SKIN: Full skin, which includes the head/face, both arms, chest, back, abdomen,both legs, genitalia and/or groin buttocks, digits and/or nails, was examined.  -R great toenail - 3mm in width light brown, regular appearing linear streak  -There is an erythematous macule with overyling adherent scale on the right lateral forehead x 1.  -Scattered brown macules on sun exposed areas.  -Mathew's skin type II, less than 100 nevi  -No other lesions of concern on areas examined.     Impression/Plan:  1. Skin Cancer Screening/FHx melanoma (patient's sister)  -ABCDs of melanoma were discussed and self skin checks were advised.   -Sun precaution was advised including the use of sun screens of SPF 30 or higher, sun protective clothing, and avoidance of tanning beds.    2. Longitudinal melanonychia - R great toe  -Photograph was obtained for clinical monitoring and inclusion in medical record.  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 Actinic keratosis x1 - right lateral forehead  -Cryotherapy procedure note: After verbal consent and discussion of risks and benefits including but no limited to dyspigmentation/scar, blister, and pain, 1 was(were) treated with 1-2mm freeze border for 2 cycles with liquid nitrogen. Post cryotherapy  instructions were provided.  -continue with annual skin exams  -Sunscreen: Apply 20 minutes prior to going outdoors and reapply every two hours, when wet or sweating. We recommend using an SPF 30 or higher, and to use one that is water resistant.       4. Solar lentigines  -discussed etiology, reassured benign  -continue with annual skin exams  -Sunscreen: Apply 20 minutes prior to going outdoors and reapply every two hours, when wet or sweating. We recommend using an SPF 30 or higher, and to use one that is water resistant.       CC Dr. Ingram on close of this encounter.  Follow-up in 1 year, earlier for new or changing lesions.       Staff Involved:  Staff Only  All risks, benefits and alternatives were discussed with patient.  Patient is in agreement and understands the assessment and plan.  All questions were answered.    Kaylen Bañuelos PA-C, MPAS  [facility]: Phone: [phone], Fax: [phone]  [facility]: Phone: [phone],  Fax: [phone]

## 2020-08-10 NOTE — LETTER
8/10/2020       RE: Deb Nelson  [address]  [facility] [address]     Dear Colleague,    Thank you for referring your patient, Deb Nelson, to the [facility] DERMATOLOGY at [facility]. Please see a copy of my visit note below.    [facility] Dermatology Note      Dermatology Problem List:  -AK's  -SK's  -Fhx melanoma (sister)  -*Longitudinal melanonychia - R great toenail - 3mm width, monitor for changes    CC:   Chief Complaint   Patient presents with     Skin Check     Deb is here today for a full skin exam. She has some concerns on her right large toenail.       Encounter Date: Aug 10, 2020    History of Present Illness:  Ms. Deb Nelson is a 75 year old female who presents for a FBSE. She has no known hx of skin cancer. She notes some concern over her right great toenail. Last seen in 2017 with Dr. Sharp for a spot check. Says he has not had toenail polish off her nails in 20-30 years, but now she has been leaving them natural and noticed a brown streak through the R great toe. No other associated sx. Fhx melanoma (sister). No personal hx of skin cancer. She otherwise has a hx of AKs which have been treated in the past. No other concerns today.    Past Medical History:   Patient Active Problem List   Diagnosis     H/O senile osteopenia     Dyspepsia     History of cataract     Generalized osteoarthritis of multiple sites     History of meningioma of the brain     Recurrent cholesteatoma of postmastoidectomy cavity, unspecified laterality     AK (actinic keratosis)     Milium cyst     Past Medical History:   Diagnosis Date     Arthritis      Cataract      Glaucoma      Meningioma (H)     Neurosurgeon in [facility]     Osseous obstruction of eustachian tube (aka CHOLESTEOTOMA)      Past Surgical History:   Procedure Laterality Date     APPENDECTOMY  1986     CATARACT IOL, RT/LT       HC EXCISION OF CHOLESTEATOMA, MIDDLE EAR  1982        Social History:  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    Family History:  There is a family history of melanoma in the patient's sister.    Medications:  Current Outpatient Medications   Medication Sig Dispense Refill     Acetaminophen (TYLENOL PO) Take 325 mg by mouth PRN       calcium citrate-vitamin D (CITRACAL) 315-200 MG-UNIT TABS per tablet Reported on 4/28/2017       vitamin  s/Minerals TABS Reported on 4/28/2017       Allergies   Allergen Reactions     Clindamycin Itching and Rash     Review of Systems:  -Constitutional: The patient denies fatigue, fevers, chills, unintended weight loss, and night sweats.  -HEENT: Patient denies nonhealing oral sores.  -Skin: As above in HPI. No additional skin concerns.    Physical exam:  Vitals: There were no vitals taken for this visit.  GEN: This is a well developed, well-nourished female in no acute distress, in a pleasant mood.    SKIN: Full skin, which includes the head/face, both arms, chest, back, abdomen,both legs, genitalia and/or groin buttocks, digits and/or nails, was examined.  -R great toenail - 3mm in width light brown, regular appearing linear streak  -There is an erythematous macule with overyling adherent scale on the right lateral forehead x 1.  -Scattered brown macules on sun exposed areas.  -Mathew's skin type II, less than 100 nevi  -No other lesions of concern on areas examined.     Impression/Plan:  1. Skin Cancer Screening/FHx melanoma (patient's sister)  -ABCDs of melanoma were discussed and self skin checks were advised.   -Sun precaution was advised including the use of sun screens of SPF 30 or higher, sun protective clothing, and avoidance of tanning beds.    2. Longitudinal melanonychia - R great toe  -Photograph was obtained for clinical monitoring and inclusion in medical record.  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 Actinic keratosis  x1 - right lateral forehead  -Cryotherapy procedure note: After verbal consent and discussion of risks and benefits including but no limited to dyspigmentation/scar, blister, and pain, 1 was(were) treated with 1-2mm freeze border for 2 cycles with liquid nitrogen. Post cryotherapy instructions were provided.  -continue with annual skin exams  -Sunscreen: Apply 20 minutes prior to going outdoors and reapply every two hours, when wet or sweating. We recommend using an SPF 30 or higher, and to use one that is water resistant.       4. Solar lentigines  -discussed etiology, reassured benign  -continue with annual skin exams  -Sunscreen: Apply 20 minutes prior to going outdoors and reapply every two hours, when wet or sweating. We recommend using an SPF 30 or higher, and to use one that is water resistant.       CC Dr. Ingram on close of this encounter.  Follow-up in 1 year, earlier for new or changing lesions.       Staff Involved:  Staff Only  All risks, benefits and alternatives were discussed with patient.  Patient is in agreement and understands the assessment and plan.  All questions were answered.    Kaylen Bañuelos PA-C, MPAS  [facility] [place]: Phone: [phone], Fax: [phone]  [facility]: Phone: [phone],  Fax: [phone]    you for allowing me to participate in the care of your patient.      Sincerely,    Kaylen Bañuelos PA-C

## 2020-08-10 NOTE — NURSING NOTE
Chief Complaint   Patient presents with     Skin Check     Deb is here today for a full skin exam. She has some concerns on her right large toenail.     ATILIO BENOIT on 8/10/2020 at 9:40 AM

## 2020-08-19 NOTE — PROGRESS NOTES
"Deb Nelson is a 75 year old female who is being evaluated via a billable video visit.      The patient has been notified of following:     \"This video visit will be conducted via a call between you and your physician/provider. We have found that certain health care needs can be provided without the need for an in-person physical exam.  This service lets us provide the care you need with a video conversation.  If a prescription is necessary we can send it directly to your pharmacy.  If lab work is needed we can place an order for that and you can then stop by our lab to have the test done at a later time.    Video visits are billed at different rates depending on your insurance coverage.  Please reach out to your insurance provider with any questions.    If during the course of the call the physician/provider feels a video visit is not appropriate, you will not be charged for this service.\"    Patient has given verbal consent for Video visit? Yes  How would you like to obtain your AVS? MyChart  If you are dropped from the video visit, the video invite should be resent to: Text to cell phone: [phone]  Will anyone else be joining your video visit? No    Subjective     Deb Nelson is a 75 year old female who presents today via video visit for the following health issues: she is following up on her DEXA scan which shows a measurable change/loss of bone mass.  She had been on Fosamax for about 5 years and stopped taking it about 10 years ago.  She was told she had a very good response and that she could stop taking it.    She is at risk for fracture due to gender, age, postmenopausal.  She is active and does regular exercise and is adding hand weights to her daily walks with her wife.      Video Start Time: 1051    CONSTITUTIONAL: NEGATIVE for fever, chills, change in weight  ENT/MOUTH: NEGATIVE for ear, mouth and throat problems  RESP: NEGATIVE for significant cough or SOB  CV: NEGATIVE for chest pain, " palpitations or peripheral edema      Objective           Vitals:  No vitals were obtained today due to virtual visit.    Physical Exam     GENERAL: Healthy, alert and no distress  EYES: Eyes grossly normal to inspection.  No discharge or erythema, or obvious scleral/conjunctival abnormalities.  RESP: No audible wheeze, cough, or visible cyanosis.  No visible retractions or increased work of breathing.    SKIN: Visible skin clear. No significant rash, abnormal pigmentation or lesions.  NEURO: Cranial nerves grossly intact.  Mentation and speech appropriate for age.  PSYCH: Mentation appears normal, affect normal/bright, judgement and insight intact, normal speech and appearance well-groomed.      Reviewed DEXA report and radiologist comparison.        Assessment & Plan     Osteopenia of right hip  Deb is interested in re-starting Fosamax.  We discussed use and when she can expect results.  We will do another DEXA in 1-2 years.  - alendronate (FOSAMAX) 70 MG tablet; Take 1 tablet (70 mg) by mouth every 7 days Take with over 8 ounces water and stay upright for at least 30 minutes after dose.  Take at least 60 minutes before breakfast    Deb Killian NP  [facility]      Video-Visit Details    Type of service:  Video Visit    Video End Time: 1115    Originating Location (pt. Location): Home    Distant Location (provider location):  [facility]     Platform used for Video Visit: Salome

## 2020-08-20 ENCOUNTER — VIRTUAL VISIT (OUTPATIENT)
Dept: FAMILY MEDICINE | Facility: CLINIC | Age: 75
End: 2020-08-20
Payer: MEDICARE

## 2020-08-20 DIAGNOSIS — M85.851 OSTEOPENIA OF RIGHT HIP: Primary | ICD-10-CM

## 2020-08-20 RX ORDER — ALENDRONATE SODIUM 70 MG/1
70 TABLET ORAL
Qty: 12 TABLET | Refills: 3 | Status: SHIPPED | OUTPATIENT
Start: 2020-08-20 | End: 2021-05-10

## 2020-11-14 ENCOUNTER — HEALTH MAINTENANCE LETTER (OUTPATIENT)
Age: 75
End: 2020-11-14

## 2021-03-10 NOTE — PROGRESS NOTES
[facility]  [address] [place] [address]  Phone: [phone]  Fax: [phone]  Primary Provider: Ezekiel Julio  Pre-op Performing Provider: VIJAY JACKSON    PREOPERATIVE EVALUATION:  Today's date: 3/17/2021    Vijay Nelson is a 75 year old female who presents for a preoperative evaluation.    Surgical Information:  Surgery/Procedure: Blepharoplasty  Surgery Location: [facility]  Surgeon: Dr. Xavier Marie  Surgery Date: 3/19/21   Time of Surgery: 7:30am  Where patient plans to recover: At home with family  Fax number for surgical facility: [phone]    Type of Anesthesia Anticipated: unknown, prepared for general    Subjective     HPI related to upcoming procedure: Vijay is experiencing peripheral loss of vision due to excessive lag of bilateral upper lids. This is part of the aging process and not associated with neurologic dysfunction.  She has seen 2 ophthalmologists for opinions, both agreed on treatment and recommended the specific surgeon.      Preop Questions 3/10/2021   1. Have you ever had a heart attack or stroke? No   2. Have you ever had surgery on your heart or blood vessels, such as a stent placement, a coronary artery bypass, or surgery on an artery in your head, neck, heart, or legs? No   3. Do you have chest pain with activity? No   4. Do you have a history of  heart failure? No   5. Do you currently have a cold, bronchitis or symptoms of other infection? No   6. Do you have a cough, shortness of breath, or wheezing? No   7. Do you or anyone in your family have previous history of blood clots? No   8. Do you or does anyone in your family have a serious bleeding problem such as prolonged bleeding following surgeries or cuts? No   9. Have you ever had problems with anemia or been told to take iron pills? No   10. Have you had any abnormal blood loss such as black, tarry or bloody stools, or abnormal vaginal bleeding? No   11. Have you ever had a blood transfusion? No    12. Are you willing to have a blood transfusion if it is medically needed before, during, or after your surgery? Yes   13. Have you or any of your relatives ever had problems with anesthesia? No   14. Do you have sleep apnea, excessive snoring or daytime drowsiness? No   15. Do you have any artifical heart valves or other implanted medical devices like a pacemaker, defibrillator, or continuous glucose monitor? No   16. Do you have artificial joints? No   17. Are you allergic to latex? No       Health Care Directive:  Patient does not have a Health Care Directive or Living Will: Patient states has Advance Directive and will bring in a copy to clinic.    Review of Systems  Constitutional, neuro, ENT, endocrine, pulmonary, cardiac, gastrointestinal, genitourinary, musculoskeletal, integument and psychiatric systems are negative, except as otherwise noted.    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 Stated okay already. Tool Fosamax for 5 years in the past.        Dyspepsia 11/02/2016     Priority: Medium     History of cataract 11/02/2016     Priority: Medium     Generalized osteoarthritis of multiple sites 11/02/2016     Priority: Medium     History of meningioma of the brain 11/02/2016     Priority: Medium     Recurrent cholesteatoma of postmastoidectomy cavity, unspecified laterality 11/02/2016     Priority: Medium      Past Medical History:   Diagnosis Date     Arthritis      Cataract      Glaucoma      Meningioma (H)     Neurosurgeon in [facility]     Osseous obstruction of eustachian tube (aka CHOLESTEOTOMA)      Past Surgical History:   Procedure Laterality Date     APPENDECTOMY  1986     CATARACT IOL, RT/LT       HC EXCISION OF CHOLESTEATOMA, MIDDLE EAR  1982     Current Outpatient Medications  "  Medication Sig Dispense Refill     Acetaminophen (TYLENOL PO) Take 325 mg by mouth PRN       alendronate (FOSAMAX) 70 MG tablet Take 1 tablet (70 mg) by mouth every 7 days Take with over 8 ounces water and stay upright for at least 30 minutes after dose.  Take at least 60 minutes before breakfast (Patient not taking: Reported on 3/15/2021) 12 tablet 3     calcium citrate-vitamin D (CITRACAL) 315-200 MG-UNIT TABS per tablet Reported on 2017       vitamin  s/Minerals TABS Reported on 2017       Deb is not taking alendronate due to significant gastric irriation.    Allergies   Allergen Reactions     Clindamycin Itching and Rash        Social History     Tobacco Use     Smoking status: Former Smoker     Packs/day: 2.00     Years: 25.00     Pack years: 50.00     Quit date: 1985     Years since quittin.6     Smokeless tobacco: Never Used   Substance Use Topics     Alcohol use: Yes     Alcohol/week: 1.7 standard drinks     Types: 2 Standard drinks or equivalent per week     Comment: 7 per week     Family History   Problem Relation Age of Onset     Colon Cancer Mother      Breast Cancer Mother      Cancer Mother      Psychotic Disorder Other         mother, depression; father schizophrenia     Dementia Brother 82     Melanoma Sister      Skin Cancer No family hx of      History   Drug Use No         Objective     /77   Pulse 64   Temp 98.5  F (36.9  C) (Oral)   Ht 1.671 m (5' 5.8\")   Wt 68.7 kg (151 lb 8 oz)   SpO2 98%   BMI 24.60 kg/m      Physical Exam    GENERAL APPEARANCE: healthy, alert and no distress     EYES: EOMI, PERRL     HENT: ear canals and TM's normal and nose and mouth without ulcers or lesions     NECK: no adenopathy, no asymmetry, masses, or scars and thyroid normal to palpation     RESP: lungs clear to auscultation - no rales, rhonchi or wheezes     CV: regular rates and rhythm, normal S1 S2, no S3 or S4 and no murmur, click or rub     ABDOMEN:  soft, nontender, no HSM or " masses and bowel sounds normal     MS: extremities normal- no gross deformities noted, no evidence of inflammation in joints, FROM in all extremities.     SKIN: no suspicious lesions or rashes     NEURO: Normal strength and tone, sensory exam grossly normal, mentation intact and speech normal     PSYCH: mentation appears normal. and affect normal/bright     LYMPHATICS: No cervical adenopathy    No results for input(s): HGB, PLT, INR, NA, POTASSIUM, CR, A1C in the last 42163 hours.     Diagnostics:  Labs pending at this time.  Results will be reviewed when available.   No EKG required for low risk surgery (cataract, skin procedure, breast biopsy, etc).    Revised Cardiac Risk Index (RCRI):  The patient has the following serious cardiovascular risks for perioperative complications:   - No serious cardiac risks = 0 points     RCRI Interpretation: 0 points: Class I (very low risk - 0.4% complication rate)    Signed Electronically by: Deb Killian NP  Copy of this evaluation report and labs will be provided to requesting physician.

## 2021-03-15 ENCOUNTER — VIRTUAL VISIT (OUTPATIENT)
Dept: FAMILY MEDICINE | Facility: CLINIC | Age: 76
End: 2021-03-15
Payer: MEDICARE

## 2021-03-15 DIAGNOSIS — K21.00 GASTROESOPHAGEAL REFLUX DISEASE WITH ESOPHAGITIS WITHOUT HEMORRHAGE: Primary | ICD-10-CM

## 2021-03-15 DIAGNOSIS — M85.80 OSTEOPENIA, UNSPECIFIED LOCATION: ICD-10-CM

## 2021-03-15 NOTE — PROGRESS NOTES
Deb is a 75 year old who is being evaluated via a billable video visit.      How would you like to obtain your AVS? MyChart  If the video visit is dropped, the invitation should be resent by: Text to cell phone: [phone]  Will anyone else be joining your video visit? No    Video Start Time: 8:01 AM    Subjective   Deb is a 75 year old who presents for the following health issues     In September of 2020 Deb started alendronate, 70 mg once weekly for a measurable bone loss noted between DEXA scans.  She began to experience severe GERD/esophagitis symptoms, within the first 2 weeks of treatment.  She tried to stick with the treatment for 6 weeks but the discomfort was so great that she discontinued the medication.  She took the medication appropriately, she also used a block to sleep with her head elevated as that was when there were the most symptoms, nothing was helpful.  She stopped other supplements and began omeprazole 20 mg twice daily for 2 weeks and it was helpful.  She has not resumed her usual supplements, she is concerned about the   severity of the reaction to the medication and the effect on her esophagus.      It has been a month since Deb has gotten her second COVID vaccine dose.      Review of Systems   CONSTITUTIONAL: NEGATIVE for fever, chills, change in weight  ENT/MOUTH: NEGATIVE for ear, mouth and throat problems  RESP: NEGATIVE for significant cough or SOB  CV: NEGATIVE for chest pain, palpitations or peripheral edema      Objective           Vitals:  No vitals were obtained today due to virtual visit.    Physical Exam   GENERAL: Healthy, alert and no distress  EYES: Eyes grossly normal to inspection.  No discharge or erythema, or obvious scleral/conjunctival abnormalities.  RESP: No audible wheeze, cough, or visible cyanosis.  No visible retractions or increased work of breathing.    SKIN: Visible skin clear. No significant rash, abnormal pigmentation or lesions.  NEURO: Cranial nerves  grossly intact.  Mentation and speech appropriate for age.  PSYCH: Mentation appears normal, affect normal/bright, judgement and insight intact, normal speech and appearance well-groomed.    1. Gastroesophageal reflux disease with esophagitis without hemorrhage    - GASTROENTEROLOGY ADULT REF CONSULT ONLY; Future    2. Osteopenia, unspecified location    - OB/GYN REFERRAL- Dr. Isabel at [facility]    Deb will wait to restart her vitamins and supplements until the above meetings are done.  We discussed the possibility of an upper endoscopy.      She has a pre-op appointment on 3/17/21, she is having a blepharoplasty on 3/19/21.    She will be in [place] for part of April and back in [place] at the end of April.      Video-Visit Details    Type of service:  Video Visit    Video End Time:8:21 AM    Originating Location (pt. Location): Home    Distant Location (provider location):  [facility]

## 2021-03-15 NOTE — PROGRESS NOTES
"Deb is a 75 year old who is being evaluated via a billable video visit.      How would you like to obtain your AVS? MyChart  If the video visit is dropped, the invitation should be resent by: Text to cell phone: [phone]  Will anyone else be joining your video visit? No  {If patient encounters technical issues they should call [phone] :696297}    Video Start Time: {video visit start/end time for provider to select:152948}    {PROVIDER CHARTING PREFERENCE:457132}    Subjective   Deb is a 75 year old who presents for the following health issues {ACCOMPANIED BY STATEMENT (Optional):061137}    HPI       {SUPERLIST (Optional):205437}  {additonal problems for provider to add (Optional):390300}    Review of Systems   {ROS COMP (Optional):282166}      Objective           Vitals:  No vitals were obtained today due to virtual visit.    Physical Exam   {video visit exam brief selected:539863::\"GENERAL: Healthy, alert and no distress\",\"EYES: Eyes grossly normal to inspection.  No discharge or erythema, or obvious scleral/conjunctival abnormalities.\",\"RESP: No audible wheeze, cough, or visible cyanosis.  No visible retractions or increased work of breathing.  \",\"SKIN: Visible skin clear. No significant rash, abnormal pigmentation or lesions.\",\"NEURO: Cranial nerves grossly intact.  Mentation and speech appropriate for age.\",\"PSYCH: Mentation appears normal, affect normal/bright, judgement and insight intact, normal speech and appearance well-groomed.\"}    {Diagnostic Test Results (Optional):978989}    {AMBULATORY ATTESTATION (Optional):329855}        Video-Visit Details    Type of service:  Video Visit    Video End Time:{video visit start/end time for provider to select:152948}    Originating Location (pt. Location): {video visit patient location:501504::\"Home\"}    Distant Location (provider location):  [employer]     Platform used for Video Visit: {Virtual Visit Platforms:638121::\"Urban Remedy\"}    "

## 2021-03-17 ENCOUNTER — OFFICE VISIT (OUTPATIENT)
Dept: FAMILY MEDICINE | Facility: CLINIC | Age: 76
End: 2021-03-17
Payer: MEDICARE

## 2021-03-17 VITALS
HEIGHT: 66 IN | WEIGHT: 151.5 LBS | OXYGEN SATURATION: 98 % | TEMPERATURE: 98.5 F | BODY MASS INDEX: 24.35 KG/M2 | SYSTOLIC BLOOD PRESSURE: 127 MMHG | DIASTOLIC BLOOD PRESSURE: 77 MMHG | HEART RATE: 64 BPM

## 2021-03-17 DIAGNOSIS — Z01.818 PRE-OP EXAM: Primary | ICD-10-CM

## 2021-03-17 LAB
ALBUMIN SERPL-MCNC: 4.1 G/DL (ref 3.4–5)
ALP SERPL-CCNC: 53 U/L (ref 40–150)
ALT SERPL W P-5'-P-CCNC: 25 U/L (ref 0–50)
ANION GAP SERPL CALCULATED.3IONS-SCNC: 5 MMOL/L (ref 3–14)
AST SERPL W P-5'-P-CCNC: 22 U/L (ref 0–45)
BASOPHILS # BLD AUTO: 0.1 10E9/L (ref 0–0.2)
BASOPHILS NFR BLD AUTO: 1.4 %
BILIRUB SERPL-MCNC: 0.4 MG/DL (ref 0.2–1.3)
BUN SERPL-MCNC: 18 MG/DL (ref 7–30)
CALCIUM SERPL-MCNC: 9.5 MG/DL (ref 8.5–10.1)
CHLORIDE SERPL-SCNC: 105 MMOL/L (ref 94–109)
CHOLEST SERPL-MCNC: 219 MG/DL
CO2 SERPL-SCNC: 27 MMOL/L (ref 20–32)
CREAT SERPL-MCNC: 0.79 MG/DL (ref 0.52–1.04)
DIFFERENTIAL METHOD BLD: NORMAL
EOSINOPHIL # BLD AUTO: 0.3 10E9/L (ref 0–0.7)
EOSINOPHIL NFR BLD AUTO: 6.2 %
ERYTHROCYTE [DISTWIDTH] IN BLOOD BY AUTOMATED COUNT: 12.6 % (ref 10–15)
GFR SERPL CREATININE-BSD FRML MDRD: 73 ML/MIN/{1.73_M2}
GLUCOSE SERPL-MCNC: 86 MG/DL (ref 70–99)
HBA1C MFR BLD: 5.3 % (ref 0–5.6)
HCT VFR BLD AUTO: 41.9 % (ref 35–47)
HDLC SERPL-MCNC: 104 MG/DL
HGB BLD-MCNC: 13.8 G/DL (ref 11.7–15.7)
IMM GRANULOCYTES # BLD: 0 10E9/L (ref 0–0.4)
IMM GRANULOCYTES NFR BLD: 0 %
LDLC SERPL CALC-MCNC: 100 MG/DL
LYMPHOCYTES # BLD AUTO: 1.3 10E9/L (ref 0.8–5.3)
LYMPHOCYTES NFR BLD AUTO: 31.7 %
MCH RBC QN AUTO: 32.5 PG (ref 26.5–33)
MCHC RBC AUTO-ENTMCNC: 32.9 G/DL (ref 31.5–36.5)
MCV RBC AUTO: 99 FL (ref 78–100)
MONOCYTES # BLD AUTO: 0.4 10E9/L (ref 0–1.3)
MONOCYTES NFR BLD AUTO: 10.6 %
NEUTROPHILS # BLD AUTO: 2.1 10E9/L (ref 1.6–8.3)
NEUTROPHILS NFR BLD AUTO: 50.1 %
NONHDLC SERPL-MCNC: 115 MG/DL
NRBC # BLD AUTO: 0 10*3/UL
NRBC BLD AUTO-RTO: 0 /100
PLATELET # BLD AUTO: 218 10E9/L (ref 150–450)
POTASSIUM SERPL-SCNC: 4.7 MMOL/L (ref 3.4–5.3)
PROT SERPL-MCNC: 7.6 G/DL (ref 6.8–8.8)
RBC # BLD AUTO: 4.25 10E12/L (ref 3.8–5.2)
SODIUM SERPL-SCNC: 137 MMOL/L (ref 133–144)
TRIGL SERPL-MCNC: 74 MG/DL
WBC # BLD AUTO: 4.2 10E9/L (ref 4–11)

## 2021-03-17 ASSESSMENT — MIFFLIN-ST. JEOR: SCORE: 1195.78

## 2021-03-17 NOTE — NURSING NOTE
"75 year old  Chief Complaint   Patient presents with     Pre-Op Exam       Blood pressure 127/77, pulse 64, temperature 98.5  F (36.9  C), temperature source Oral, height 1.671 m (5' 5.8\"), weight 68.7 kg (151 lb 8 oz), SpO2 98 %, not currently breastfeeding. Body mass index is 24.6 kg/m .      Patient Active Problem List   Diagnosis     H/O senile osteopenia     Dyspepsia     History of cataract     Generalized osteoarthritis of multiple sites     History of meningioma of the brain     Recurrent cholesteatoma of postmastoidectomy cavity, unspecified laterality     AK (actinic keratosis)     Milium cyst       Wt Readings from Last 2 Encounters:   21 68.7 kg (151 lb 8 oz)   20 67.7 kg (149 lb 4.8 oz)     BP Readings from Last 3 Encounters:   21 127/77   20 138/87   19 114/67       Allergies   Allergen Reactions     Clindamycin Itching and Rash       Current Outpatient Medications   Medication     Acetaminophen (TYLENOL PO)     alendronate (FOSAMAX) 70 MG tablet     calcium citrate-vitamin D (CITRACAL) 315-200 MG-UNIT TABS per tablet     vitamin  s/Minerals TABS     No current facility-administered medications for this visit.        Social History     Tobacco Use     Smoking status: Former Smoker     Packs/day: 2.00     Years: 25.00     Pack years: 50.00     Quit date: 1985     Years since quittin.6     Smokeless tobacco: Never Used   Substance Use Topics     Alcohol use: Yes     Alcohol/week: 1.7 standard drinks     Types: 2 Standard drinks or equivalent per week     Comment: 7 per week     Drug use: No       Honoring Choices - Health Care Directive Guide offered to patient at time of visit.    Health Maintenance Due   Topic Date Due     ADVANCE CARE PLANNING  Never done     COVID-19 Vaccine (1 of 2) Never done     HEPATITIS C SCREENING  Never done     ZOSTER IMMUNIZATION (2 of 3) 2013     MAMMO SCREENING  2017     FALL RISK ASSESSMENT  2018     LIPID  " 08/18/2019     INFLUENZA VACCINE (1) 09/01/2020       Immunization History   Administered Date(s) Administered     HEPA 05/28/2009, 10/21/2009     HepB 05/28/2009, 10/21/2009, 04/13/2010     Influenza (High Dose) 3 valent vaccine 11/02/2016     Influenza (IIV3) PF 09/16/2013     Urdu Encephalitis IM 07/02/2012, 07/27/2012     OPV, monovalent, unspecified 05/28/2009     Pneumo Conj 13-V (2010&after) 11/02/2016     Pneumococcal 23 valent 07/28/2010     TDAP Vaccine (Boostrix) 11/02/2016     Tdap (Adacel,Boostrix) 04/03/2006     Typhoid IM 05/28/2009     Zoster vaccine, live 09/16/2013       No results found for: PAP      Recent Labs   Lab Test 08/18/14  1236   LDL 81      TRIG 45   ALT 21   CR 0.76   GFRESTIMATED 76   GFRESTBLACK >90   GFR Calc     ALBUMIN 4.0   POTASSIUM 4.5       PHQ-2 ( 1999 Pfizer) 3/15/2021 8/19/2020   Q1: Little interest or pleasure in doing things 0 0   Q2: Feeling down, depressed or hopeless 0 0   PHQ-2 Score 0 0       PHQ-9 SCORE 7/21/2020   PHQ-9 Total Score MyChart 0   PHQ-9 Total Score 0       OPAL-7 SCORE 7/21/2020   Total Score 4 (minimal anxiety)   Total Score 4       No flowsheet data found.      Lisa Trevino, [facility]  March 17, 2021 8:04 AM

## 2021-04-11 NOTE — TELEPHONE ENCOUNTER
REFERRAL INFORMATION:    Referring Provider:  Deb Killian NP    Referring Clinic:  [facility]     Reason for Visit/Diagnosis: Abdominal pain, GERD     FUTURE VISIT INFORMATION:    Appointment Date: 5/4/2021    Appointment Time: 2:40 PM      NOTES STATUS DETAILS   OFFICE NOTE from Referring Provider Internal 3/15/2021 Office visit with Deb Killian NP    OFFICE NOTE from Other Specialist N/A    HOSPITAL DISCHARGE SUMMARY/  ED VISITS N/A    OPERATIVE REPORT N/A    MEDICATION LIST Internal         ENDOSCOPY  N/A    COLONOSCOPY 5/3 Sent to Scan 10/22/15   ERCP N/A    EUS N/A    STOOL TESTING N/A    PERTINENT LABS Internal    PATHOLOGY REPORTS (RELATED) In process    IMAGING (CT, MRI, EGD, MRCP, Small Bowel Follow Through/SBT, MR/CT Enterography) N/A      4/27/2021 2:21pm Fax request sent to [facility] ([phone]) for 2015 colonoscopy report. -[facility]     Records Requested  05/03/21    Facility  [facility] - GI  Phone: [phone]   Outcome * 5/3/21 Called out to [facility] for colonoscopy report to be faxed to clinic. - Karon    * 5/3/21 2:36 PM Report received from [facility] and sent to HIM to be scanned into the chart. - Karon

## 2021-05-04 ENCOUNTER — PRE VISIT (OUTPATIENT)
Dept: GASTROENTEROLOGY | Facility: CLINIC | Age: 76
End: 2021-05-04

## 2021-05-04 ENCOUNTER — VIRTUAL VISIT (OUTPATIENT)
Dept: GASTROENTEROLOGY | Facility: CLINIC | Age: 76
End: 2021-05-04
Attending: NURSE PRACTITIONER
Payer: MEDICARE

## 2021-05-04 VITALS — WEIGHT: 145 LBS | BODY MASS INDEX: 21.98 KG/M2 | HEIGHT: 68 IN

## 2021-05-04 DIAGNOSIS — M85.9 LOW BONE DENSITY: ICD-10-CM

## 2021-05-04 DIAGNOSIS — Z80.0 FAMILY HISTORY OF COLON CANCER: ICD-10-CM

## 2021-05-04 DIAGNOSIS — Z92.29 HISTORY OF ALENDRONATE THERAPY: ICD-10-CM

## 2021-05-04 DIAGNOSIS — K21.9 GASTROESOPHAGEAL REFLUX DISEASE, UNSPECIFIED WHETHER ESOPHAGITIS PRESENT: Primary | ICD-10-CM

## 2021-05-04 PROCEDURE — 99204 OFFICE O/P NEW MOD 45 MIN: CPT | Mod: 95 | Performed by: INTERNAL MEDICINE

## 2021-05-04 ASSESSMENT — MIFFLIN-ST. JEOR: SCORE: 1201.22

## 2021-05-04 ASSESSMENT — PAIN SCALES - GENERAL: PAINLEVEL: NO PAIN (0)

## 2021-05-04 NOTE — PROGRESS NOTES
Referring provider: Deb Killian    CC: 75 year old female referred by Deb Killian for evaluation of GERD.    HPI:   74 yo woman with decreased bone density who developed GERD after starting alendronate.    Severe recurrence of reflux, dyspespia, heartburn. Severe case about 4 years ago, started omeprazole for a month and everything resolved. Since then took occasional Tums. Then last summer bone density was low, started alendronate. Started to have severe reflux including at night. Stopped alendronate in January. Sleeps on wedge now. Still gets nocturnal pyrosis about 2 nights per week and occasionally during the day. Tums helps, takes 3-4 days per week. Not taking omeprazole currently, initially started x2 weeks and it helped. No dysphagia.    Took alendronate an hour before eating, first thing in the morning, with a glass of water, and stood up or sat (ie not lying down) for the hour afterwards.    EGD in 20s for duodenal ulcer. Colonoscopy 6 years ago. Mother had colon cancer, in addition to 8 of her siblings.    A 10 point ROS is otherwise negative.    Past Medical History:   Diagnosis Date     Arthritis      Cataract      Glaucoma      Meningioma (H)     Neurosurgeon in [facility]     Osseous obstruction of eustachian tube (aka CHOLESTEOTOMA)      Past Surgical History:   Procedure Laterality Date     APPENDECTOMY       CATARACT IOL, RT/LT       HC EXCISION OF CHOLESTEATOMA, MIDDLE EAR       Family History   Problem Relation Age of Onset     Colon Cancer Mother      Breast Cancer Mother      Cancer Mother      Psychotic Disorder Other         mother, depression; father schizophrenia     Dementia Brother 82     Melanoma Sister      Skin Cancer No family hx of    Mother with colon cancer along with 8 of her siblings.    Social History     Tobacco Use     Smoking status: Former Smoker     Packs/day: 2.00     Years: 25.00     Pack years: 50.00     Quit date: 1985     Years since quittin.7      Smokeless tobacco: Never Used   Substance Use Topics     Alcohol use: Yes     Alcohol/week: 1.7 standard drinks     Types: 2 Standard drinks or equivalent per week     Comment: 7 per week   Nurse (retired),  for [employer].     Current Outpatient Medications   Medication     Acetaminophen (TYLENOL PO)     alendronate (FOSAMAX) 70 MG tablet     vitamin  s/Minerals TABS     No current facility-administered medications for this visit.       Physical exam:  GEN: NAD  Eyes: EOMI  Ears: hearing intact  Mouth: MMM  Neck: full ROM  Cardiopulmonary: non labored  Skin: no jaundice  Neuro: awake and oriented  MSK: moves arms equally  Psych: normal affect     Labs:   CMP, CBC 3/17/21 normal    Imaging:   DEXA 8/10/20 low bone density    Endoscopy:  Colonoscopy 10/22/15 normal    Assessment and recommendations:   76 yo woman with decreased bone density who developed GERD after starting alendronate.    GERD   Typical symptoms of pyrosis and reflux, improving since stopping alendronate but not resolved. No dysphagia. Had taken alendronate as directed. Improved but still present after course of omeprazole.     - EGD  - 2 tabs Gaviscon at night  - agree with considering other medications for low bone density rather than alendronate    Family history of colon cancer  Mother with CRC. Last colonoscopy 2015 was normal. No personal history of polyps. We discussed stool testing (FIT, cologuard), which are not recommended for patients with a family history of colon cancer but could be considered if screening colonoscopy is not being considered. She is ok with screening colonoscopy.     - colonoscopy    RTC 3 months

## 2021-05-04 NOTE — PATIENT INSTRUCTIONS
It was a pleasure taking care of you today.  I've included a brief summary of our discussion and care plan from today's visit below.  Please review this information with your primary care provider.  _______________________________________________________________________    My recommendations are summarized as follows:  - start Gaviscon 2 tablets at night before bed  - schedule EGD and colonoscopy    Please call our nurse Geri with any questions or concerns- [phone].  --    Return to GI Clinic in 3 months to review your progress.    _______________________________________________________________________    Who do I call with any questions after my visit?  Please be in touch if there are any further questions that arise following today's visit.  There are multiple ways to contact your gastroenterology care team.        During business hours, you may reach a Gastroenterology nurse at [phone] and choose option 3.         To schedule or reschedule an appointment, please call [phone].       You can always send a secure message through [employer].  [employer] messages are answered by your nurse or doctor typically within 24 hours.  Please allow extra time on weekends and holidays.        For urgent/emergent questions after business hours, you may reach the on-call GI Fellow by contacting the [facility] at [phone].     How will I get the results of any tests ordered?    You will receive all of your results.  If you have signed up for [employer], any tests ordered at your visit will be available to you after your physician reviews them.  Typically this takes 1-2 weeks.  If there are urgent results that require a change in your care plan, your physician or nurse will call you to discuss the next steps.      What is [employer]?  [employer] is a secure way for you to access all of your healthcare records from the [facility].  It is a web based computer program, so you can sign on to it from  any location.  It also allows you to send secure messages to your care team.  I recommend signing up for [employer] access if you have not already done so and are comfortable with using a computer.      How to I schedule a follow-up visit?  If you did not schedule a follow-up visit today, please call [phone] to schedule a follow-up office visit.        Sincerely,    Mac Soto MD     [facility]  Division of Gastroenterology

## 2021-05-04 NOTE — NURSING NOTE
"Chief Complaint   Patient presents with     Surgical Followup     New consult for GERD and abdominal pain       Vitals:    05/04/21 1416   Weight: 145 lb   Height: 5' 8\"       Body mass index is 22.05 kg/m .        Hillary Rocha CMA    "

## 2021-05-04 NOTE — LETTER
"    5/4/2021         RE: Deb Nelson  [address]  [place] [address]        Dear Colleague,    Thank you for referring your patient, Deb Nelson, to the [facility]STRO[facility]OLOG[facility]. Please see a copy of my visit note below.    Deb Nleson is a 75 year old female who is being evaluated via a billable video visit.      The patient has been notified of following:     \"This video visit will be conducted via a call between you and your physician/provider. We have found that certain health care needs can be provided without the need for an in-person physical exam.  This service lets us provide the care you need with a video conversation.  If a prescription is necessary we can send it directly to your pharmacy.  If lab work is needed we can place an order for that and you can then stop by our lab to have the test done at a later time.    If during the course of the call the physician/provider feels a video visit is not appropriate, you will not be charged for this service.\"     Patient confirmed that they are in [place] for today's visit YES    How would you like to obtain your AVS? MyChart  If the video visit is dropped, the invitation should be resent by: Send to e-mail at: [email or web address]  Will anyone else be joining your video visit? No    Video-Visit Details  Type of service:  Video Visit    Video Start Time: 252p  Video End Time:  312p    Originating Location (pt. Location): Home    Distant Location (provider location):  [facility]STROENTEROLOGY CLINIC [place]     Platform used: "[employer]      Referring provider: Deb Killian    CC: 75 year old female referred by Deb Killian for evaluation of GERD.    HPI:   76 yo woman with decreased bone density who developed GERD after starting alendronate.    Severe recurrence of reflux, dyspespia, heartburn. Severe case about 4 years ago, started omeprazole for a month and everything resolved. " Since then took occasional Tums. Then last summer bone density was low, started alendronate. Started to have severe reflux including at night. Stopped alendronate in January. Sleeps on wedge now. Still gets nocturnal pyrosis about 2 nights per week and occasionally during the day. Tums helps, takes 3-4 days per week. Not taking omeprazole currently, initially started x2 weeks and it helped. No dysphagia.    Took alendronate an hour before eating, first thing in the morning, with a glass of water, and stood up or sat (ie not lying down) for the hour afterwards.    EGD in 20s for duodenal ulcer. Colonoscopy 6 years ago. Mother had colon cancer, in addition to 8 of her siblings.    A 10 point ROS is otherwise negative.    Past Medical History:   Diagnosis Date     Arthritis      Cataract      Glaucoma      Meningioma (H)     Neurosurgeon in [facility]     Osseous obstruction of eustachian tube (aka CHOLESTEOTOMA)      Past Surgical History:   Procedure Laterality Date     APPENDECTOMY       CATARACT IOL, RT/LT       HC EXCISION OF CHOLESTEATOMA, MIDDLE EAR       Family History   Problem Relation Age of Onset     Colon Cancer Mother      Breast Cancer Mother      Cancer Mother      Psychotic Disorder Other         mother, depression; father schizophrenia     Dementia Brother 82     Melanoma Sister      Skin Cancer No family hx of    Mother with colon cancer along with 8 of her siblings.    Social History     Tobacco Use     Smoking status: Former Smoker     Packs/day: 2.00     Years: 25.00     Pack years: 50.00     Quit date: 1985     Years since quittin.7     Smokeless tobacco: Never Used   Substance Use Topics     Alcohol use: Yes     Alcohol/week: 1.7 standard drinks     Types: 2 Standard drinks or equivalent per week     Comment: 7 per week   Nurse (retired),  for [employer].     Current Outpatient Medications   Medication     Acetaminophen (TYLENOL PO)     alendronate (FOSAMAX)  70 MG tablet     vitamin  s/Minerals TABS     No current facility-administered medications for this visit.       Physical exam:  GEN: NAD  Eyes: EOMI  Ears: hearing intact  Mouth: MMM  Neck: full ROM  Cardiopulmonary: non labored  Skin: no jaundice  Neuro: awake and oriented  MSK: moves arms equally  Psych: normal affect     Labs:   CMP, CBC 3/17/21 normal    Imaging:   DEXA 8/10/20 low bone density    Endoscopy:  Colonoscopy 10/22/15 normal    Assessment and recommendations:   76 yo woman with decreased bone density who developed GERD after starting alendronate.    GERD   Typical symptoms of pyrosis and reflux, improving since stopping alendronate but not resolved. No dysphagia. Had taken alendronate as directed. Improved but still present after course of omeprazole.     - EGD  - 2 tabs Gaviscon at night  - agree with considering other medications for low bone density rather than alendronate    Family history of colon cancer  Mother with CRC. Last colonoscopy 2015 was normal. No personal history of polyps. We discussed stool testing (FIT, cologuard), which are not recommended for patients with a family history of colon cancer but could be considered if screening colonoscopy is not being considered. She is ok with screening colonoscopy.     - colonoscopy    RTC 3 months    Again, thank you for allowing me to participate in the care of your patient.      Sincerely,    Mac Soto MD

## 2021-05-04 NOTE — PROGRESS NOTES
"Deb Nelson is a 75 year old female who is being evaluated via a billable video visit.      The patient has been notified of following:     \"This video visit will be conducted via a call between you and your physician/provider. We have found that certain health care needs can be provided without the need for an in-person physical exam.  This service lets us provide the care you need with a video conversation.  If a prescription is necessary we can send it directly to your pharmacy.  If lab work is needed we can place an order for that and you can then stop by our lab to have the test done at a later time.    If during the course of the call the physician/provider feels a video visit is not appropriate, you will not be charged for this service.\"     Patient confirmed that they are in [place] for today's visit YES    How would you like to obtain your AVS? MyChart  If the video visit is dropped, the invitation should be resent by: Send to e-mail at: [email or web address]  Will anyone else be joining your video visit? No    Video-Visit Details  Type of service:  Video Visit    Video Start Time: 252p  Video End Time:  312p    Originating Location (pt. Location): Home    Distant Location (provider location):  [facility]STRO[facility]INIC [place]     Platform used: [employer]          "

## 2021-05-07 ENCOUNTER — TELEPHONE (OUTPATIENT)
Dept: GASTROENTEROLOGY | Facility: CLINIC | Age: 76
End: 2021-05-07

## 2021-05-07 ASSESSMENT — ENCOUNTER SYMPTOMS
VOMITING: 0
NAUSEA: 0
HEARTBURN: 1
DIARRHEA: 0
CONSTIPATION: 0
BOWEL INCONTINENCE: 0
BLOOD IN STOOL: 0
BLOATING: 0
ABDOMINAL PAIN: 0
RECTAL PAIN: 0
JAUNDICE: 0

## 2021-05-07 ASSESSMENT — ANXIETY QUESTIONNAIRES
GAD7 TOTAL SCORE: 1
5. BEING SO RESTLESS THAT IT IS HARD TO SIT STILL: NOT AT ALL
GAD7 TOTAL SCORE: 1
2. NOT BEING ABLE TO STOP OR CONTROL WORRYING: NOT AT ALL
6. BECOMING EASILY ANNOYED OR IRRITABLE: NOT AT ALL
7. FEELING AFRAID AS IF SOMETHING AWFUL MIGHT HAPPEN: NOT AT ALL
7. FEELING AFRAID AS IF SOMETHING AWFUL MIGHT HAPPEN: NOT AT ALL
4. TROUBLE RELAXING: NOT AT ALL
1. FEELING NERVOUS, ANXIOUS, OR ON EDGE: NOT AT ALL
3. WORRYING TOO MUCH ABOUT DIFFERENT THINGS: SEVERAL DAYS

## 2021-05-07 NOTE — TELEPHONE ENCOUNTER
1st schedule attempt    Procedure: Upper Endoscopy with Mac Brittany    Lower Endoscopy    Upper Endoscopy Type: EGD    Upper Endoscopy Sedation: Conscious/Moderate    Upper Endoscopy Reason for Procedure: reflux, heartburn    Lower Endoscopy Type: Colonoscopy    Purpose of Colonoscopy Procedure: Screening family history of colon cancer   Colonoscopy Sedation: Conscious/Moderate    Preferred Location: [facility] with Mac Brittayn   Scheduling Instructions: If you have not heard from the scheduling office within 2 business days, please call [phone].           Associated Diagnoses    Gastroesophageal reflux disease, unspecified whether esophagitis present [K21.9]  - Primary       Family history of colon cancer [Z80.0]

## 2021-05-10 ENCOUNTER — OFFICE VISIT (OUTPATIENT)
Dept: FAMILY MEDICINE | Facility: CLINIC | Age: 76
End: 2021-05-10
Attending: NURSE PRACTITIONER
Payer: MEDICARE

## 2021-05-10 ENCOUNTER — HOSPITAL ENCOUNTER (OUTPATIENT)
Facility: CLINIC | Age: 76
End: 2021-05-10
Attending: INTERNAL MEDICINE | Admitting: INTERNAL MEDICINE
Payer: MEDICARE

## 2021-05-10 DIAGNOSIS — M81.0 SENILE OSTEOPOROSIS: Primary | ICD-10-CM

## 2021-05-10 PROBLEM — H35.3131 NONEXUDATIVE AGE-RELATED MACULAR DEGENERATION, BILATERAL, EARLY DRY STAGE: Status: ACTIVE | Noted: 2020-12-17

## 2021-05-10 PROBLEM — Z96.1 PSEUDOPHAKIA, BOTH EYES: Status: ACTIVE | Noted: 2020-12-17

## 2021-05-10 PROBLEM — H90.A31 MIXED CONDUCTIVE AND SENSORINEURAL HEARING LOSS OF RIGHT EAR WITH RESTRICTED HEARING OF LEFT EAR: Status: ACTIVE | Noted: 2017-12-22

## 2021-05-10 PROBLEM — L72.0 MILIUM CYST: Status: RESOLVED | Noted: 2017-10-20 | Resolved: 2021-05-10

## 2021-05-10 PROBLEM — Z80.0 FAMILY HISTORY OF MALIGNANT NEOPLASM OF GASTROINTESTINAL TRACT: Status: ACTIVE | Noted: 2021-05-10

## 2021-05-10 PROBLEM — H57.819 BROW PTOSIS: Status: ACTIVE | Noted: 2020-12-17

## 2021-05-10 LAB
ANION GAP SERPL CALCULATED.3IONS-SCNC: 3 MMOL/L (ref 3–14)
BUN SERPL-MCNC: 20 MG/DL (ref 7–30)
CALCIUM SERPL-MCNC: 9.1 MG/DL (ref 8.5–10.1)
CHLORIDE SERPL-SCNC: 107 MMOL/L (ref 94–109)
CO2 SERPL-SCNC: 29 MMOL/L (ref 20–32)
CREAT SERPL-MCNC: 0.82 MG/DL (ref 0.52–1.04)
DEPRECATED CALCIDIOL+CALCIFEROL SERPL-MC: 37 UG/L (ref 20–75)
GFR SERPL CREATININE-BSD FRML MDRD: 69 ML/MIN/{1.73_M2}
GLUCOSE SERPL-MCNC: 96 MG/DL (ref 70–99)
MAGNESIUM SERPL-MCNC: 2.5 MG/DL (ref 1.6–2.3)
PHOSPHATE SERPL-MCNC: 2.9 MG/DL (ref 2.5–4.5)
POTASSIUM SERPL-SCNC: 4.6 MMOL/L (ref 3.4–5.3)
PTH-INTACT SERPL-MCNC: 43 PG/ML (ref 18–80)
SODIUM SERPL-SCNC: 139 MMOL/L (ref 133–144)
TSH SERPL DL<=0.005 MIU/L-ACNC: 2.44 MU/L (ref 0.4–4)

## 2021-05-10 PROCEDURE — 36415 COLL VENOUS BLD VENIPUNCTURE: CPT | Performed by: FAMILY MEDICINE

## 2021-05-10 PROCEDURE — 83970 ASSAY OF PARATHORMONE: CPT | Performed by: FAMILY MEDICINE

## 2021-05-10 PROCEDURE — 84100 ASSAY OF PHOSPHORUS: CPT | Performed by: FAMILY MEDICINE

## 2021-05-10 PROCEDURE — 82306 VITAMIN D 25 HYDROXY: CPT | Performed by: FAMILY MEDICINE

## 2021-05-10 PROCEDURE — G0463 HOSPITAL OUTPT CLINIC VISIT: HCPCS

## 2021-05-10 PROCEDURE — 84080 ASSAY ALKALINE PHOSPHATASES: CPT | Performed by: FAMILY MEDICINE

## 2021-05-10 PROCEDURE — 84443 ASSAY THYROID STIM HORMONE: CPT | Performed by: FAMILY MEDICINE

## 2021-05-10 PROCEDURE — 80048 BASIC METABOLIC PNL TOTAL CA: CPT | Performed by: FAMILY MEDICINE

## 2021-05-10 PROCEDURE — 99214 OFFICE O/P EST MOD 30 MIN: CPT | Performed by: FAMILY MEDICINE

## 2021-05-10 PROCEDURE — 83735 ASSAY OF MAGNESIUM: CPT | Performed by: FAMILY MEDICINE

## 2021-05-10 ASSESSMENT — PATIENT HEALTH QUESTIONNAIRE - PHQ9
5. POOR APPETITE OR OVEREATING: NOT AT ALL
SUM OF ALL RESPONSES TO PHQ QUESTIONS 1-9: 0

## 2021-05-10 ASSESSMENT — ANXIETY QUESTIONNAIRES
5. BEING SO RESTLESS THAT IT IS HARD TO SIT STILL: NOT AT ALL
2. NOT BEING ABLE TO STOP OR CONTROL WORRYING: NOT AT ALL
1. FEELING NERVOUS, ANXIOUS, OR ON EDGE: NOT AT ALL
6. BECOMING EASILY ANNOYED OR IRRITABLE: NOT AT ALL
7. FEELING AFRAID AS IF SOMETHING AWFUL MIGHT HAPPEN: NOT AT ALL
GAD7 TOTAL SCORE: 0
3. WORRYING TOO MUCH ABOUT DIFFERENT THINGS: NOT AT ALL

## 2021-05-10 NOTE — PROGRESS NOTES
ASSESSMENT:  This is a 74 yo PM female with OP by hx of fragility fracture who had severe heartburn from trying 2 months of alendronate last fall.  She has been on Boniva for 5 yrs about 15 yrs ago.  Her most recent DXA does show significant bone loss in spite of her osteopenic scores. . Her fracture risk by FRAX is moderate.   We discussed calcium and vit D.  We discussed prolia and IV reclast. - she decided that she prefers IV reclast because she travels a lot.  Will have Dr Aguilar contact her.  Kidney function is good.  Will check blood work for bone metabolism.     PLAN:  Blood work today  Patient should take 1200mg of calcium/day in divided doses - diet and all supplements combined -  and vitamin D3 1000IU/day.  Dietary calcium is best  Tums are a good source of calcium or calcium citrate - a 315mg tablet  = 1 glass of milk  Dr Aguilar [place] [facility] [place]  Get DXA in 2 yrs from now  5/2022      Thank you for allowing me to participate in the care of your patient.  Please do not hesitate to call with questions or concerns.    Sincerely,  Jie Isabel MD, PhD  CC Deb Killian NP    Time note ((n4, 45'): The total time (on the date of service) for this service was >45 minutes, including discussion/face-to-face, chart review, interpretation not otherwise reported, documentation, and updating of the computerized record.    DXA and records were personally reviewed by me.        Deb is a  75 year old female post menopausal] [GR0, P0] that presents today for osteoporosis consult.   Referring Physician: Deb KillianNP    HPI     Have you ever had a bone density test? Yes  Where = [facility]  When = 8/2020 2014  Spine Tscore = L1-4  -0.3  Left neck Tscore = -1.0  Total left hip Tscore = -1.2  LOSS 3.9%  Right neck Tscore = -1.8  Total Right hip Tscore = -1.9  LOSS 7%  Have you received any x-ray dye or contrast in the last ten days? No  How many servings of dairy  "products do you consume per day? 1-2 Type: 1/2 whole milk, cheese   Do you take a multi-vitamin daily? No  Do you take a vitamin D supplement? No  Do you take a calcium supplement daily? No  Do you take a supplement containing strontium? No  Are you exposed to natural sunlight at least 20 minutes three times a week? Yes  Have you broken any bones during your adult life? Yes. Details: fx left wrist 2016  How tall were you at age 25? 68\"  Have you gone through menopause? Yes  Did your menopause occur before age 45? No  Have you taken hormone therapy? Yes. Details: took for 5 yrs  - ages [age]    Social History   reports that she quit smoking about 35 years ago. She has a 50.00 pack-year smoking history. She has never used smokeless tobacco. She reports current alcohol use of about 1.7 standard drinks of alcohol per week. She reports that she does not use drugs.  Do you smoke cigarettes? Reformed smoker   Do you exercise? Yes. Details: walk 5 mi/day   Do you drink alcohol? Yes. Details: 1-2/day     Medication History  Have you taken any of the following medications?   Blood thinner (Coumadin or Heparin): No   Chemotherapy (ex: Lupron, Arimidex): No   Depo Provera: No   Anti-Seizure (ex: Dilantin, Depakote): No   Steroids (ex: Prednisone, Cortisone): No   Thyroid (ex: Synthroid): No  Have you used any of the following medications?   Actonel (Risedronate): No   Aredia (Pamidronate): No   Boniva (Ibindronate): 15 yrs ago  For about 5 yrs - and good response and stopped it    Didronil (Etidronate): No   Evista (Raloxifene): No   Fosamax (Alendronate): 2 months stopped last Nov 2020  - had severe heartburn   Forteo (Parathyroid hormone) injections: No   HCTZ (Thiazide): No   Calcitonin nasal spray: No   Reclast or Zometa (Zolendronate): No   Prolia (Denosumab): No   HT: yes in past   Current Outpatient Medications   Medication Sig Dispense Refill     Acetaminophen (TYLENOL PO) Take 325 mg by mouth PRN            Allergies "   Allergen Reactions     Clindamycin Itching and Rash       Past Medical History  Do you have or have you had any of the following?   Celiac sprue (wheat intolerance):No   Chronic low back problems (ex: scohosis, arthritis): No   Diabetes mellitus: No   High blood calcium level: No   Hip or spine injury: No   History of an eating disorder: No   History of gastric bypass: No   Hyperparathyroidism: No   Inflammatory bowel disease (ex: Crohn's, ulcerative colitis): No   Kidney disease: No   Kidney stones: No   Liver disease: No   Lupus: No   Overactive thyroid gland: No   Rhuematoid arthritis: No    Have you had any of the following?   Hysterectomy: No   Ovaries removed: No   Breast cancer: No   Family history of breast cancer: Yes. Details: mother    Family History   Problem Relation Age of Onset     Colon Cancer Mother      Breast Cancer Mother      Cancer Mother      Psychotic Disorder Other         mother, depression; father schizophrenia     Dementia Brother 82     Melanoma Sister      Skin Cancer No family hx of      Is there a family history of osteoporosis? Yes. Details: mother, brother and sister   Did either parent have a hip fracture? No    ROS:  Answers for HPI/ROS submitted by the patient on 5/7/2021   OPAL 7 TOTAL SCORE: 1  General Symptoms: No  Skin Symptoms: No  HENT Symptoms: No  EYE SYMPTOMS: No  HEART SYMPTOMS: No  LUNG SYMPTOMS: No  INTESTINAL SYMPTOMS: Yes  URINARY SYMPTOMS: No  GYNECOLOGIC SYMPTOMS: No  BREAST SYMPTOMS: No  SKELETAL SYMPTOMS: No  BLOOD SYMPTOMS: No  NERVOUS SYSTEM SYMPTOMS: No  MENTAL HEALTH SYMPTOMS: No  Heart burn or indigestion: Yes  Nausea: No  Vomiting: No  Abdominal pain: No  Bloating: No  Constipation: No  Diarrhea: No  Blood in stool: No  Black stools: No  Rectal or Anal pain: No  Fecal incontinence: No  Yellowing of skin or eyes: No  Vomit with blood: No  Change in stools: No      Clinic Measurements  Vitals: There were no vitals taken for this visit.  BMI= There is no  height or weight on file to calculate BMI.    Physical exam  Constitutional: Well appearing woman in no acute distress.   Psychological: appropriate mood.  Neck: No thyroidmegaly. No jugular venous distension, no carotid bruits.  Cardiovascular: regular rate and rhythm, normal S1 and S2, no murmurs, rubs or gallops, peripheral pulses full and symmetric   Respiratory: clear to auscultation, no wheezes or crackles, normal breath sounds.  Gastrointestinal: positive bowel sounds, nontender, no hepatosplenomegaly, no masses. No guarding or rebound.  Spine: Straight, not tender, Flexion good, Extension good, Lateral movement good, Rotational movement good  Musculoskeletal: full range of motion, no edema and motor strength is equal in the upper and lower extremities    Skin: no concerning lesions, no jaundice.  Neurological: cranial nerves intact, normal strength, reflexes at patella and biceps normal, normal gait, no tremor.     LAB  Vertebra; Fracture Assessment: NA  Dexa Scan: 8/2020      FRAX Assessment Tool:  7.0% for 10 risk Major Osteoporotic, 2.1% for 10 risk of Hip Fracture]  Risk Factors: age. PM, Tscores, reformed smoker  +FHx

## 2021-05-10 NOTE — LETTER
5/10/2021       RE: Deb Nelson  [address]  [place] [address]     Dear Colleague,    Thank you for referring your patient, Deb Nelson, to the [facility] [place] [facility]. Please see a copy of my visit note below.    ASSESSMENT:  This is a 76 yo PM female with OP by hx of fragility fracture who had severe heartburn from trying 2 months of alendronate last fall.  She has been on Boniva for 5 yrs about 15 yrs ago.  Her most recent DXA does show significant bone loss in spite of her osteopenic scores. . Her fracture risk by FRAX is moderate.   We discussed calcium and vit D.  We discussed prolia and IV reclast. - she decided that she prefers IV reclast because she travels a lot.  Will have Dr Aguilar contact her.  Kidney function is good.  Will check blood work for bone metabolism.     PLAN:  Blood work today  Patient should take 1200mg of calcium/day in divided doses - diet and all supplements combined -  and vitamin D3 1000IU/day.  Dietary calcium is best  Tums are a good source of calcium or calcium citrate - a 315mg tablet  = 1 glass of milk  Dr Aguilar [place] [facility] [place]  Get DXA in 2 yrs from now  5/2022      Thank you for allowing me to participate in the care of your patient.  Please do not hesitate to call with questions or concerns.    Sincerely,  Jie Isabel MD, PhD  CC Deb Killian NP    Time note ((n4, 45'): The total time (on the date of service) for this service was >45 minutes, including discussion/face-to-face, chart review, interpretation not otherwise reported, documentation, and updating of the computerized record.    DXA and records were personally reviewed by me.        Deb is a  75 year old female post menopausal] [GR0, P0] that presents today for osteoporosis consult.   Referring Physician: Deb KillianNP    HPI     Have you ever had a  "bone density test? Yes  Where = [facility] [place]  When = 8/2020 2014  Spine Tscore = L1-4  -0.3  Left neck Tscore = -1.0  Total left hip Tscore = -1.2  LOSS 3.9%  Right neck Tscore = -1.8  Total Right hip Tscore = -1.9  LOSS 7%  Have you received any x-ray dye or contrast in the last ten days? No  How many servings of dairy products do you consume per day? 1-2 Type: 1/2 whole milk, cheese   Do you take a multi-vitamin daily? No  Do you take a vitamin D supplement? No  Do you take a calcium supplement daily? No  Do you take a supplement containing strontium? No  Are you exposed to natural sunlight at least 20 minutes three times a week? Yes  Have you broken any bones during your adult life? Yes. Details: fx left wrist 2016  How tall were you at age 25? 68\"  Have you gone through menopause? Yes  Did your menopause occur before age 45? No  Have you taken hormone therapy? Yes. Details: took for 5 yrs  - ages [age]    Social History   reports that she quit smoking about 35 years ago. She has a 50.00 pack-year smoking history. She has never used smokeless tobacco. She reports current alcohol use of about 1.7 standard drinks of alcohol per week. She reports that she does not use drugs.  Do you smoke cigarettes? Reformed smoker   Do you exercise? Yes. Details: walk 5 mi/day   Do you drink alcohol? Yes. Details: 1-2/day     Medication History  Have you taken any of the following medications?   Blood thinner (Coumadin or Heparin): No   Chemotherapy (ex: Lupron, Arimidex): No   Depo Provera: No   Anti-Seizure (ex: Dilantin, Depakote): No   Steroids (ex: Prednisone, Cortisone): No   Thyroid (ex: Synthroid): No  Have you used any of the following medications?   Actonel (Risedronate): No   Aredia (Pamidronate): No   Boniva (Ibindronate): 15 yrs ago  For about 5 yrs - and good response and stopped it    Didronil (Etidronate): No   Evista (Raloxifene): No   Fosamax (Alendronate): 2 months stopped last Nov 2020  - had severe heartburn   Forteo " (Parathyroid hormone) injections: No   HCTZ (Thiazide): No   Calcitonin nasal spray: No   Reclast or Zometa (Zolendronate): No   Prolia (Denosumab): No   HT: yes in past   Current Outpatient Medications   Medication Sig Dispense Refill     Acetaminophen (TYLENOL PO) Take 325 mg by mouth PRN            Allergies   Allergen Reactions     Clindamycin Itching and Rash       Past Medical History  Do you have or have you had any of the following?   Celiac sprue (wheat intolerance):No   Chronic low back problems (ex: scohosis, arthritis): No   Diabetes mellitus: No   High blood calcium level: No   Hip or spine injury: No   History of an eating disorder: No   History of gastric bypass: No   Hyperparathyroidism: No   Inflammatory bowel disease (ex: Crohn's, ulcerative colitis): No   Kidney disease: No   Kidney stones: No   Liver disease: No   Lupus: No   Overactive thyroid gland: No   Rhuematoid arthritis: No    Have you had any of the following?   Hysterectomy: No   Ovaries removed: No   Breast cancer: No   Family history of breast cancer: Yes. Details: mother    Family History   Problem Relation Age of Onset     Colon Cancer Mother      Breast Cancer Mother      Cancer Mother      Psychotic Disorder Other         mother, depression; father schizophrenia     Dementia Brother 82     Melanoma Sister      Skin Cancer No family hx of      Is there a family history of osteoporosis? Yes. Details: mother, brother and sister   Did either parent have a hip fracture? No    ROS:  Answers for HPI/ROS submitted by the patient on 5/7/2021   OPAL 7 TOTAL SCORE: 1  General Symptoms: No  Skin Symptoms: No  HENT Symptoms: No  EYE SYMPTOMS: No  HEART SYMPTOMS: No  LUNG SYMPTOMS: No  INTESTINAL SYMPTOMS: Yes  URINARY SYMPTOMS: No  GYNECOLOGIC SYMPTOMS: No  BREAST SYMPTOMS: No  SKELETAL SYMPTOMS: No  BLOOD SYMPTOMS: No  NERVOUS SYSTEM SYMPTOMS: No  MENTAL HEALTH SYMPTOMS: No  Heart burn or indigestion: Yes  Nausea: No  Vomiting: No  Abdominal  pain: No  Bloating: No  Constipation: No  Diarrhea: No  Blood in stool: No  Black stools: No  Rectal or Anal pain: No  Fecal incontinence: No  Yellowing of skin or eyes: No  Vomit with blood: No  Change in stools: No      Clinic Measurements  Vitals: There were no vitals taken for this visit.  BMI= There is no height or weight on file to calculate BMI.    Physical exam  Constitutional: Well appearing woman in no acute distress.   Psychological: appropriate mood.  Neck: No thyroidmegaly. No jugular venous distension, no carotid bruits.  Cardiovascular: regular rate and rhythm, normal S1 and S2, no murmurs, rubs or gallops, peripheral pulses full and symmetric   Respiratory: clear to auscultation, no wheezes or crackles, normal breath sounds.  Gastrointestinal: positive bowel sounds, nontender, no hepatosplenomegaly, no masses. No guarding or rebound.  Spine: Straight, not tender, Flexion good, Extension good, Lateral movement good, Rotational movement good  Musculoskeletal: full range of motion, no edema and motor strength is equal in the upper and lower extremities    Skin: no concerning lesions, no jaundice.  Neurological: cranial nerves intact, normal strength, reflexes at patella and biceps normal, normal gait, no tremor.     LAB  Vertebra; Fracture Assessment: NA  Dexa Scan: 8/2020      FRAX Assessment Tool:  7.0% for 10 risk Major Osteoporotic, 2.1% for 10 risk of Hip Fracture]  Risk Factors: age. PM, Tscores, reformed smoker  +FHx

## 2021-05-10 NOTE — PATIENT INSTRUCTIONS
Blood work today  Patient should take 1200mg of calcium/day in divided doses - diet and all supplements combined -  and vitamin D3 1000IU/day.  Dietary calcium is best  Tums are a good source of calcium or calcium citrate - a 315mg tablet  = 1 glass of milk  Dr Aguilar will call to set up the IV reclast at [facility]  See me 1 yr  Get DXA in 2 yrs from now

## 2021-05-11 DIAGNOSIS — Z20.822 ENCOUNTER FOR LABORATORY TESTING FOR COVID-19 VIRUS: Primary | ICD-10-CM

## 2021-05-11 DIAGNOSIS — M81.0 SENILE OSTEOPOROSIS: ICD-10-CM

## 2021-05-11 LAB — ALP BONE SERPL-MCNC: 8.8 UG/L

## 2021-05-11 RX ORDER — ZOLEDRONIC ACID 5 MG/100ML
5 INJECTION, SOLUTION INTRAVENOUS ONCE
Status: CANCELLED
Start: 2021-05-11 | End: 2021-05-11

## 2021-05-11 RX ORDER — HEPARIN SODIUM,PORCINE 10 UNIT/ML
5 VIAL (ML) INTRAVENOUS
Status: CANCELLED | OUTPATIENT
Start: 2021-05-11

## 2021-05-11 RX ORDER — HEPARIN SODIUM (PORCINE) LOCK FLUSH IV SOLN 100 UNIT/ML 100 UNIT/ML
5 SOLUTION INTRAVENOUS
Status: CANCELLED | OUTPATIENT
Start: 2021-05-11

## 2021-05-12 ENCOUNTER — TELEPHONE (OUTPATIENT)
Dept: GASTROENTEROLOGY | Facility: CLINIC | Age: 76
End: 2021-05-12

## 2021-05-12 NOTE — TELEPHONE ENCOUNTER
Caller: Deb Nelson    Procedure: EGD and Colon    Date of Procedure Cancelled: 7/5/2021    Ordering Provider:Brittany    Reason for cancel: pt wanted an earlier date    Rescheduled: yes to 6/17/2021     If rescheduled    Date:6/17/2021    Location:[facility]    Note any change or update to original order/sedation: NO

## 2021-05-23 DIAGNOSIS — Z20.822 ENCOUNTER FOR LABORATORY TESTING FOR COVID-19 VIRUS: ICD-10-CM

## 2021-05-23 LAB
LABORATORY COMMENT REPORT: NORMAL
SARS-COV-2 RNA RESP QL NAA+PROBE: NEGATIVE
SARS-COV-2 RNA RESP QL NAA+PROBE: NORMAL
SPECIMEN SOURCE: NORMAL
SPECIMEN SOURCE: NORMAL

## 2021-05-23 PROCEDURE — U0005 INFEC AGEN DETEC AMPLI PROBE: HCPCS | Performed by: FAMILY MEDICINE

## 2021-05-23 PROCEDURE — U0003 INFECTIOUS AGENT DETECTION BY NUCLEIC ACID (DNA OR RNA); SEVERE ACUTE RESPIRATORY SYNDROME CORONAVIRUS 2 (SARS-COV-2) (CORONAVIRUS DISEASE [COVID-19]), AMPLIFIED PROBE TECHNIQUE, MAKING USE OF HIGH THROUGHPUT TECHNOLOGIES AS DESCRIBED BY CMS-2020-01-R: HCPCS | Performed by: FAMILY MEDICINE

## 2021-05-24 RX ORDER — HEPARIN SODIUM (PORCINE) LOCK FLUSH IV SOLN 100 UNIT/ML 100 UNIT/ML
5 SOLUTION INTRAVENOUS
Status: CANCELLED | OUTPATIENT
Start: 2021-05-24

## 2021-05-24 RX ORDER — HEPARIN SODIUM,PORCINE 10 UNIT/ML
5 VIAL (ML) INTRAVENOUS
Status: CANCELLED | OUTPATIENT
Start: 2021-05-24

## 2021-05-24 RX ORDER — ZOLEDRONIC ACID 5 MG/100ML
5 INJECTION, SOLUTION INTRAVENOUS ONCE
Status: CANCELLED
Start: 2021-05-24 | End: 2021-05-24

## 2021-05-25 ENCOUNTER — INFUSION THERAPY VISIT (OUTPATIENT)
Dept: INFUSION THERAPY | Facility: CLINIC | Age: 76
End: 2021-05-25
Attending: FAMILY MEDICINE
Payer: MEDICARE

## 2021-05-25 VITALS
RESPIRATION RATE: 16 BRPM | HEART RATE: 68 BPM | DIASTOLIC BLOOD PRESSURE: 62 MMHG | OXYGEN SATURATION: 97 % | SYSTOLIC BLOOD PRESSURE: 113 MMHG

## 2021-05-25 DIAGNOSIS — Z11.59 ENCOUNTER FOR SCREENING FOR OTHER VIRAL DISEASES: ICD-10-CM

## 2021-05-25 DIAGNOSIS — M81.0 SENILE OSTEOPOROSIS: Primary | ICD-10-CM

## 2021-05-25 PROCEDURE — 96365 THER/PROPH/DIAG IV INF INIT: CPT

## 2021-05-25 PROCEDURE — 250N000011 HC RX IP 250 OP 636: Performed by: FAMILY MEDICINE

## 2021-05-25 RX ORDER — ZOLEDRONIC ACID 5 MG/100ML
5 INJECTION, SOLUTION INTRAVENOUS ONCE
Status: COMPLETED | OUTPATIENT
Start: 2021-05-25 | End: 2021-05-25

## 2021-05-25 RX ADMIN — ZOLEDRONIC ACID 5 MG: 0.05 INJECTION, SOLUTION INTRAVENOUS at 12:27

## 2021-05-25 ASSESSMENT — PAIN SCALES - GENERAL: PAINLEVEL: NO PAIN (0)

## 2021-05-25 NOTE — LETTER
5/25/2021         RE: Deb Nelson  [address]  [place] [address]        Dear Colleague,    Thank you for referring your patient, Deb Nelson, to the [facility]. Please see a copy of my visit note below.    Nursing Note  Deb [facility]:   Chief Complaint   Patient presents with     Infusion     Reclast     During [facility]'s [facility] appointment, orders from Dr Isabel were completed.  Frequency: once    Progress note:  Patient identification verified by name and date of birth.  Assessment completed.  Vitals recorded in Doc Flowsheets.  Patient was provided with education regarding medication/procedure and possible side effects.  Patient verbalized understanding.     present during visit today: Not Applicable.    Treatment Conditions: Patient's Creatinine Clearance and calcium are within paramaters to administer medication per orders.    Premedications: were not ordered.    Drug Waste Record: No    Infusion length and rate:  infusion given over approximately 15 minutes    Labs: were not ordered for this appointment.    Vascular access: peripheral IV placed today.    Is the next appt scheduled? n/a  Asymptomatic COVID test completed? n/a    Post Infusion Assessment:  Patient tolerated infusion without incident.     Discharge Plan:   Follow up plan of care with: ordering provider as scheduled.  Discharge instructions were reviewed with patient.  Patient/representative verbalized understanding of discharge instructions and all questions answered.  Patient discharged from [facility] in stable condition.    TG HANKINS, RUTHANN    Administrations This Visit     zoledronic Acid (RECLAST) infusion 5 mg     Admin Date  05/25/2021 Action  New Bag Dose  5 mg Rate  400 mL/hr Route  Intravenous Administered By  Tg Hankins, RN                 /62   Pulse 68   Resp 16   SpO2 97%           Again, thank you for allowing me to participate in the care of your patient.        Sincerely,        [facility] [place]

## 2021-05-25 NOTE — PROGRESS NOTES
Nursing Note  Deb Nelson [facility] for:   Chief Complaint   Patient presents with     Infusion     Reclast     During [facility]'s [facility] appointment, orders from Dr Isabel were completed.  Frequency: once    Progress note:  Patient identification verified by name and date of birth.  Assessment completed.  Vitals recorded in Doc Flowsheets.  Patient was provided with education regarding medication/procedure and possible side effects.  Patient verbalized understanding.     present during visit today: Not Applicable.    Treatment Conditions: Patient's Creatinine Clearance and calcium are within paramaters to administer medication per orders.    Premedications: were not ordered.    Drug Waste Record: No    Infusion length and rate:  infusion given over approximately 15 minutes    Labs: were not ordered for this appointment.    Vascular access: peripheral IV placed today.    Is the next appt scheduled? n/a  Asymptomatic COVID test completed? n/a    Post Infusion Assessment:  Patient tolerated infusion without incident.     Discharge Plan:   Follow up plan of care with: ordering provider as scheduled.  Discharge instructions were reviewed with patient.  Patient/representative verbalized understanding of discharge instructions and all questions answered.  [facility] in [facility] condition.    SILVANO HANKINS, RUTHANN    Administrations This Visit     zoledronic Acid (RECLAST) infusion 5 mg     Admin Date  05/25/2021 Action  New Bag Dose  5 mg Rate  400 mL/hr Route  Intravenous Administered By  Silvano Hankins, RN                /62   Pulse 68   Resp 16   SpO2 97%

## 2021-06-07 ENCOUNTER — TELEPHONE (OUTPATIENT)
Dept: GASTROENTEROLOGY | Facility: CLINIC | Age: 76
End: 2021-06-07

## 2021-06-10 NOTE — TELEPHONE ENCOUNTER
2nd attempt to contact patient regarding upcoming colonoscopy and EGD procedure on 6.17.2021 for pre assessment questions.  No answer.  Left message to return call to [phone] #2    COVID test 6.14.2021    Ca Melendez RN

## 2021-06-14 DIAGNOSIS — Z11.59 ENCOUNTER FOR SCREENING FOR OTHER VIRAL DISEASES: ICD-10-CM

## 2021-06-14 PROCEDURE — U0003 INFECTIOUS AGENT DETECTION BY NUCLEIC ACID (DNA OR RNA); SEVERE ACUTE RESPIRATORY SYNDROME CORONAVIRUS 2 (SARS-COV-2) (CORONAVIRUS DISEASE [COVID-19]), AMPLIFIED PROBE TECHNIQUE, MAKING USE OF HIGH THROUGHPUT TECHNOLOGIES AS DESCRIBED BY CMS-2020-01-R: HCPCS | Performed by: INTERNAL MEDICINE

## 2021-06-14 PROCEDURE — U0005 INFEC AGEN DETEC AMPLI PROBE: HCPCS | Performed by: INTERNAL MEDICINE

## 2021-06-14 NOTE — TELEPHONE ENCOUNTER
Writer reviewed pre-assessment questions with patient prior to upcoming colonoscopy and EGD on 6.17.2021.  COVID test scheduled for 6.14.2021.    Attempted to review miralax and magnesium citrate prep with patient however pt declined.  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      Designated  policy reviewed with patient.     Patient verbalized understanding.  No further questions or concerns.    Ca Melendez RN

## 2021-06-16 RX ORDER — ONDANSETRON 2 MG/ML
4 INJECTION INTRAMUSCULAR; INTRAVENOUS
Status: CANCELLED | OUTPATIENT
Start: 2021-06-16

## 2021-06-16 RX ORDER — LIDOCAINE 40 MG/G
CREAM TOPICAL
Status: CANCELLED | OUTPATIENT
Start: 2021-06-16

## 2021-06-17 ENCOUNTER — HOSPITAL ENCOUNTER (OUTPATIENT)
Facility: CLINIC | Age: 76
Discharge: HOME | End: 2021-06-17
Attending: INTERNAL MEDICINE | Admitting: INTERNAL MEDICINE
Payer: MEDICARE

## 2021-06-17 VITALS
SYSTOLIC BLOOD PRESSURE: 132 MMHG | RESPIRATION RATE: 4 BRPM | DIASTOLIC BLOOD PRESSURE: 84 MMHG | OXYGEN SATURATION: 98 % | HEART RATE: 56 BPM

## 2021-06-17 LAB
COLONOSCOPY: NORMAL
UPPER GI ENDOSCOPY: NORMAL

## 2021-06-17 PROCEDURE — 45380 COLONOSCOPY AND BIOPSY: CPT | Performed by: INTERNAL MEDICINE

## 2021-06-17 PROCEDURE — 250N000009 HC RX 250: Performed by: INTERNAL MEDICINE

## 2021-06-17 PROCEDURE — 250N000011 HC RX IP 250 OP 636: Performed by: INTERNAL MEDICINE

## 2021-06-17 PROCEDURE — G0500 MOD SEDAT ENDO SERVICE >5YRS: HCPCS | Performed by: INTERNAL MEDICINE

## 2021-06-17 PROCEDURE — 99153 MOD SED SAME PHYS/QHP EA: CPT | Performed by: INTERNAL MEDICINE

## 2021-06-17 PROCEDURE — 250N000013 HC RX MED GY IP 250 OP 250 PS 637: Performed by: INTERNAL MEDICINE

## 2021-06-17 PROCEDURE — 88305 TISSUE EXAM BY PATHOLOGIST: CPT | Mod: 26 | Performed by: PATHOLOGY

## 2021-06-17 PROCEDURE — 43239 EGD BIOPSY SINGLE/MULTIPLE: CPT | Performed by: INTERNAL MEDICINE

## 2021-06-17 PROCEDURE — 88305 TISSUE EXAM BY PATHOLOGIST: CPT | Mod: TC | Performed by: INTERNAL MEDICINE

## 2021-06-17 RX ORDER — FENTANYL CITRATE 50 UG/ML
INJECTION, SOLUTION INTRAMUSCULAR; INTRAVENOUS PRN
Status: COMPLETED | OUTPATIENT
Start: 2021-06-17 | End: 2021-06-17

## 2021-06-17 RX ORDER — NALOXONE HYDROCHLORIDE 0.4 MG/ML
0.2 INJECTION, SOLUTION INTRAMUSCULAR; INTRAVENOUS; SUBCUTANEOUS
Status: DISCONTINUED | OUTPATIENT
Start: 2021-06-17 | End: 2021-06-17 | Stop reason: HOSPADM

## 2021-06-17 RX ORDER — LIDOCAINE 40 MG/G
CREAM TOPICAL
Status: DISCONTINUED | OUTPATIENT
Start: 2021-06-17 | End: 2021-06-17 | Stop reason: HOSPADM

## 2021-06-17 RX ORDER — FLUMAZENIL 0.1 MG/ML
0.2 INJECTION, SOLUTION INTRAVENOUS
Status: DISCONTINUED | OUTPATIENT
Start: 2021-06-17 | End: 2021-06-17 | Stop reason: HOSPADM

## 2021-06-17 RX ORDER — ONDANSETRON 4 MG/1
4 TABLET, ORALLY DISINTEGRATING ORAL EVERY 6 HOURS PRN
Status: DISCONTINUED | OUTPATIENT
Start: 2021-06-17 | End: 2021-06-17 | Stop reason: HOSPADM

## 2021-06-17 RX ORDER — ONDANSETRON 2 MG/ML
4 INJECTION INTRAMUSCULAR; INTRAVENOUS
Status: DISCONTINUED | OUTPATIENT
Start: 2021-06-17 | End: 2021-06-17 | Stop reason: HOSPADM

## 2021-06-17 RX ORDER — NALOXONE HYDROCHLORIDE 0.4 MG/ML
0.4 INJECTION, SOLUTION INTRAMUSCULAR; INTRAVENOUS; SUBCUTANEOUS
Status: DISCONTINUED | OUTPATIENT
Start: 2021-06-17 | End: 2021-06-17 | Stop reason: HOSPADM

## 2021-06-17 RX ORDER — SIMETHICONE 40MG/0.6ML
SUSPENSION, DROPS(FINAL DOSAGE FORM)(ML) ORAL PRN
Status: COMPLETED | OUTPATIENT
Start: 2021-06-17 | End: 2021-06-17

## 2021-06-17 RX ORDER — ONDANSETRON 2 MG/ML
4 INJECTION INTRAMUSCULAR; INTRAVENOUS EVERY 6 HOURS PRN
Status: DISCONTINUED | OUTPATIENT
Start: 2021-06-17 | End: 2021-06-17 | Stop reason: HOSPADM

## 2021-06-17 RX ORDER — ZOLEDRONIC ACID 5 MG/100ML
INJECTION, SOLUTION INTRAVENOUS
COMMUNITY
Start: 2021-05-25 | End: 2022-05-09

## 2021-06-17 RX ORDER — PROCHLORPERAZINE MALEATE 5 MG
5 TABLET ORAL EVERY 6 HOURS PRN
Status: DISCONTINUED | OUTPATIENT
Start: 2021-06-17 | End: 2021-06-17 | Stop reason: HOSPADM

## 2021-06-17 RX ADMIN — SIMETHICONE 133 MG: 20 SUSPENSION/ DROPS ORAL at 09:14

## 2021-06-17 RX ADMIN — MIDAZOLAM 2 MG: 1 INJECTION INTRAMUSCULAR; INTRAVENOUS at 09:27

## 2021-06-17 RX ADMIN — MIDAZOLAM 1 MG: 1 INJECTION INTRAMUSCULAR; INTRAVENOUS at 03:48

## 2021-06-17 RX ADMIN — TOPICAL ANESTHETIC 1 SPRAY: 200 SPRAY DENTAL; PERIODONTAL at 09:29

## 2021-06-17 RX ADMIN — FENTANYL CITRATE 50 MCG: 50 INJECTION, SOLUTION INTRAMUSCULAR; INTRAVENOUS at 09:45

## 2021-06-17 RX ADMIN — FENTANYL CITRATE 100 MCG: 50 INJECTION, SOLUTION INTRAMUSCULAR; INTRAVENOUS at 09:27

## 2021-06-18 LAB — COPATH REPORT: NORMAL

## 2021-07-01 ENCOUNTER — OFFICE VISIT (OUTPATIENT)
Dept: FAMILY MEDICINE | Facility: CLINIC | Age: 76
End: 2021-07-01
Payer: MEDICARE

## 2021-07-01 VITALS
HEART RATE: 62 BPM | OXYGEN SATURATION: 96 % | SYSTOLIC BLOOD PRESSURE: 153 MMHG | TEMPERATURE: 98.6 F | DIASTOLIC BLOOD PRESSURE: 82 MMHG | HEIGHT: 66 IN | WEIGHT: 153.3 LBS | BODY MASS INDEX: 24.64 KG/M2

## 2021-07-01 DIAGNOSIS — Z01.818 PRE-OP EXAM: Primary | ICD-10-CM

## 2021-07-01 ASSESSMENT — MIFFLIN-ST. JEOR: SCORE: 1205.52

## 2021-07-01 NOTE — NURSING NOTE
"ROOM:1    Preferred Name: Deb     75 year old  Chief Complaint   Patient presents with     Pre-Op Exam       Blood pressure (!) 153/82, pulse 62, temperature 98.6  F (37  C), temperature source Oral, height 1.674 m (5' 5.9\"), weight 69.5 kg (153 lb 4.8 oz), SpO2 96 %, not currently breastfeeding. Body mass index is 24.82 kg/m .      Patient Active Problem List   Diagnosis     H/O senile osteopenia     Dyspepsia     History of cataract     Generalized osteoarthritis of multiple sites     History of meningioma of the brain     Recurrent cholesteatoma of postmastoidectomy cavity, unspecified laterality     AK (actinic keratosis)     Anatomical narrow angle borderline glaucoma     Brow ptosis     Degenerative, intervertebral disc, cervical     Family history of malignant neoplasm of gastrointestinal tract     Family history of musculoskeletal disease     Fx wrist     Meningioma (H)     Mixed conductive and sensorineural hearing loss of right ear with restricted hearing of left ear     Nonexudative age-related macular degeneration, bilateral, early dry stage     Pseudophakia, both eyes     PVD (posterior vitreous detachment)     Right shoulder pain     Senile osteoporosis       Wt Readings from Last 2 Encounters:   21 69.5 kg (153 lb 4.8 oz)   21 65.8 kg (145 lb)     BP Readings from Last 3 Encounters:   21 (!) 153/82   21 132/84   21 113/62       Allergies   Allergen Reactions     Clindamycin Itching and Rash       Current Outpatient Medications   Medication     Acetaminophen (TYLENOL PO)     zoledronic Acid (RECLAST) 5 MG/100ML SOLN infusion     No current facility-administered medications for this visit.        Social History     Tobacco Use     Smoking status: Former Smoker     Packs/day: 2.00     Years: 25.00     Pack years: 50.00     Types: Cigarettes     Start date: 1960     Quit date: 1985     Years since quittin.9     Smokeless tobacco: Never Used   Substance Use " Topics     Alcohol use: Yes     Alcohol/week: 1.7 standard drinks     Comment: 7 per week     Drug use: No       Honoring Choices - Health Care Directive Guide offered to patient at time of visit.    Health Maintenance Due   Topic Date Due     ADVANCE CARE PLANNING  Never done     COVID-19 Vaccine (1) Never done     HEPATITIS C SCREENING  Never done     ZOSTER IMMUNIZATION (2 of 3) 11/11/2013     MAMMO SCREENING  10/22/2017     FALL RISK ASSESSMENT  12/01/2018     MEDICARE ANNUAL WELLNESS VISIT  07/27/2021       Immunization History   Administered Date(s) Administered     DTAP (<7y) 04/03/2006     Flu, Unspecified 09/24/2012     HEPA 05/28/2009, 10/21/2009     HepA-Adult 05/28/2009, 10/21/2009     HepB 05/28/2009, 10/21/2009, 04/13/2010     HepB-Adult 05/28/2009, 10/21/2009, 04/13/2010     Influenza (High Dose) 3 valent vaccine 11/02/2016, 11/17/2017, 10/16/2018     Influenza (IIV3) PF 11/17/2008, 11/01/2009, 09/16/2013     Japanese Encephalitis IM 07/02/2012, 07/27/2012     OPV, monovalent, unspecified 05/28/2009     Pneumo Conj 13-V (2010&after) 11/02/2016, 10/16/2018     Pneumococcal 23 valent 07/28/2010, 12/13/2010     Polio, Unspecified  05/28/2009     Poliovirus, inactivated (IPV) 05/28/2009     Rabavert 07/02/2012, 07/09/2012, 07/23/2012     TDAP Vaccine (Boostrix) 11/02/2016     Tdap (Adacel,Boostrix) 04/03/2006     Typhoid IM 05/28/2009     Typhoid Oral 05/28/2009     Zoster vaccine, live 09/16/2013       No results found for: PAP      Recent Labs   Lab Test 05/10/21  0921 03/17/21  0840 08/18/14  1236   A1C  --  5.3  --    LDL  --  100* 81   HDL  --  104 104   TRIG  --  74 45   ALT  --  25 21   CR 0.82 0.79 0.76   GFRESTIMATED 69 73 76   GFRESTBLACK 80 85 >90   GFR Calc     ALBUMIN  --  4.1 4.0   POTASSIUM 4.6 4.7 4.5   TSH 2.44  --   --        PHQ-2 ( 1999 Pfizer) 7/1/2021 5/10/2021   Q1: Little interest or pleasure in doing things 0 0   Q2: Feeling down, depressed or hopeless 0 0   PHQ-2  Score 0 0       PHQ-9 SCORE 7/21/2020 5/10/2021   PHQ-9 Total Score MyChart 0 -   PHQ-9 Total Score 0 0       OPAL-7 SCORE 7/21/2020 5/7/2021 5/10/2021   Total Score 4 (minimal anxiety) 1 (minimal anxiety) -   Total Score 4 1 0       No flowsheet data found.      Lisa Trevino, FABY  July 1, 2021 8:03 AM

## 2021-09-09 ENCOUNTER — TELEPHONE (OUTPATIENT)
Dept: GASTROENTEROLOGY | Facility: CLINIC | Age: 76
End: 2021-09-09

## 2021-09-09 NOTE — TELEPHONE ENCOUNTER
felixm to schedule 3 month follow up with Dr. Soto, left call center phone number and sent [employer].

## 2021-09-12 ENCOUNTER — HEALTH MAINTENANCE LETTER (OUTPATIENT)
Age: 76
End: 2021-09-12

## 2022-05-05 ENCOUNTER — OFFICE VISIT (OUTPATIENT)
Dept: DERMATOLOGY | Facility: CLINIC | Age: 77
End: 2022-05-05
Payer: MEDICARE

## 2022-05-05 DIAGNOSIS — L81.4 LENTIGINES: ICD-10-CM

## 2022-05-05 DIAGNOSIS — Z80.8 FAMILY HISTORY OF MELANOMA: ICD-10-CM

## 2022-05-05 DIAGNOSIS — D22.9 MULTIPLE BENIGN NEVI: ICD-10-CM

## 2022-05-05 DIAGNOSIS — L60.8 LONGITUDINAL MELANONYCHIA: ICD-10-CM

## 2022-05-05 DIAGNOSIS — D18.01 CHERRY ANGIOMA: ICD-10-CM

## 2022-05-05 DIAGNOSIS — L57.0 ACTINIC KERATOSIS: Primary | ICD-10-CM

## 2022-05-05 PROCEDURE — 17000 DESTRUCT PREMALG LESION: CPT | Performed by: PHYSICIAN ASSISTANT

## 2022-05-05 PROCEDURE — 17003 DESTRUCT PREMALG LES 2-14: CPT | Performed by: PHYSICIAN ASSISTANT

## 2022-05-05 PROCEDURE — 99214 OFFICE O/P EST MOD 30 MIN: CPT | Mod: 25 | Performed by: PHYSICIAN ASSISTANT

## 2022-05-05 ASSESSMENT — PAIN SCALES - GENERAL: PAINLEVEL: NO PAIN (0)

## 2022-05-05 NOTE — NURSING NOTE
Dermatology Rooming Note    Deb Nelson's goals for this visit include:   Chief Complaint   Patient presents with     Skin Check     States that she has a spot on the forehead     Pilar Zavala CMA on 5/5/2022 at 11:19 AM

## 2022-05-05 NOTE — LETTER
5/5/2022       RE: Deb Nelson  [address]  [facility] [address]     Dear Colleague,    Thank you for referring your patient, Deb Nelson, to the [facility] [place] [facility]. Please see a copy of my visit note below.    [facility] D[facility]atology Note  Encounter Date: May 5, 2022  Office Visit     Dermatology Problem List:  # AK's s/p cryo  **Low threshold to biopsy lesion on L distal forearm if it does not resolve.  # Fhx melanoma (sister)  # Longitudinal melanonychia - R great toenail - 3mm width, monitor for changes, unchanged 5/5/22     ____________________________________________    Assessment & Plan:    # AK x7  **Low threshold to biopsy lesion on L distal forearm if it does not resolve. Asked patient to monitor and return if not resolved.    - Cryotherapy today, see procedure note below     # *Longitudinal melanonychia - R great toenail - 3mm width  No changes on exam today. No treatment necessary.     # Multiple benign nevi. Solar lentigines.   - No concerning lesions today  - Monitor for ABCDEs of melanoma   - Continue sun protection - recommend SPF 30 or higher with frequent application   - Return sooner if noticing changing or symptomatic lesions    # Cherry angiomas   Discussed the natural history and benign nature of this lesion. Reassurance provided that no additional treatment is necessary.      # Family history of melanoma (sister)  - Continue photoprotection - recommend SPF 30 or higher with frequent reapplication  - Continue yearly skin exams  - Advised to monitor for changing, non-healing, bleeding, painful, changing, or otherwise symptomatic lesions    Procedures Performed:   - Cryotherapy procedure note, location(s): Superior R eyebrow x1, R forehead x1, R upper cheek x1, L forehead x1, L temple x1, L distal forearm x1, and L shin x1. After verbal consent and discussion of risks  and benefits including, but not limited to, dyspigmentation/scar, blister, and pain, 7 lesion(s) was(were) treated with 1-2 mm freeze border for 1-2 cycles with liquid nitrogen. Post cryotherapy instructions were provided.    Follow-up: 1 year(s) in-person, or earlier for new or changing lesions    Staff and Scribe:     Scribe Disclosure:  I, Destiny Govea, am serving as a scribe to document services personally performed by Kaylen Bañuelos PA-C based on data collection and the provider's statements to me.     Provider Disclosure:   The documentation recorded by the scribe accurately reflects the services I personally performed and the decisions made by me.    All risks, benefits and alternatives were discussed with patient.  Patient is in agreement and understands the assessment and plan.  All questions were answered.    Kaylen Bañuelos PA-C, [facility]  [facility]: Phone: [phone], Fax: [phone]  [facility]: Phone: [phone],  Fax: [phone]  [facility]: Phone: [phone], Fax: [phone]  ____________________________________________    CC: Skin Check (States that she has a spot on the forehead)    HPI:  Ms. Deb Nelson is a(n) 76 year old female who presents today as a return patient for [facility]. Last seen by myself on 8/10/2020 at which point 1 AK on the R lateral forehead was treated with cryotherapy.     Today, the patient points to a lesion on her forehead that she would like examined. The patient denies any changes to the longitudinal melanonychia on her R great toe nail. When prompted, there is a pink spot on her L forearm that is not symptomatic. Patient is otherwise feeling well, without additional skin concerns.    Labs Reviewed:  N/A    Physical Exam:  Vitals: There were no vitals taken for this visit.  SKIN: Total skin excluding the undergarment areas was performed. The exam  included the head/face, neck, both arms, chest, back, abdomen, both legs, digits and/or nails.   - There are erythematous macules with overyling adherent scale on the superior R eyebrow x1, R forehead x1, R upper cheek x1, L forehead x1, L temple x1, L distal forearm x1, and L shin x1.  - R great toenail - 3mm in width light brown, regular appearing linear streak. No changes.   - There are dome shaped bright red papules on the abdomen.   - Multiple regular brown pigmented macules and papules are identified on the trunk and extremities.   - Scattered brown macules on sun exposed areas.  - No other lesions of concern on areas examined.     Medications:  Current Outpatient Medications   Medication     Acetaminophen (TYLENOL PO)     zoledronic Acid (RECLAST) 5 MG/100ML SOLN infusion     No current facility-administered medications for this visit.      Past Medical History:   Patient Active Problem List   Diagnosis     H/O senile osteopenia     Dyspepsia     History of cataract     Generalized osteoarthritis of multiple sites     History of meningioma of the brain     Recurrent cholesteatoma of postmastoidectomy cavity, unspecified laterality     AK (actinic keratosis)     Anatomical narrow angle borderline glaucoma     Brow ptosis     Degenerative, intervertebral disc, cervical     Family history of malignant neoplasm of gastrointestinal tract     Family history of musculoskeletal disease     Fx wrist     Meningioma (H)     Mixed conductive and sensorineural hearing loss of right ear with restricted hearing of left ear     Nonexudative age-related macular degeneration, bilateral, early dry stage     Pseudophakia, both eyes     PVD (posterior vitreous detachment)     Right shoulder pain     Senile osteoporosis     Past Medical History:   Diagnosis Date     Arthritis      Cataract      Glaucoma      Meningioma (H)     Neurosurgeon in [facility]     Osseous obstruction of eustachian tube (aka CHOLESTEOTOMA)

## 2022-05-05 NOTE — PATIENT INSTRUCTIONS
Patient Education     Checking for Skin Cancer  You can find cancer early by checking your skin each month. There are 3 kinds of skin cancer. They are melanoma, basal cell carcinoma, and squamous cell carcinoma. Doing monthly skin checks is the best way to find new marks or skin changes. Follow the instructions below for checking your skin.   The ABCDEs of checking moles for melanoma   Check your moles or growths for signs of melanoma using ABCDE:   Asymmetry: the sides of the mole or growth don t match  Border: the edges are ragged, notched, or blurred  Color: the color within the mole or growth varies  Diameter: the mole or growth is larger than 6 mm (size of a pencil eraser)  Evolving: the size, shape, or color of the mole or growth is changing (evolving is not shown in the images below)    Checking for other types of skin cancer  Basal cell carcinoma or squamous cell carcinoma have symptoms such as:     A spot or mole that looks different from all other marks on your skin  Changes in how an area feels, such as itching, tenderness, or pain  Changes in the skin's surface, such as oozing, bleeding, or scaliness  A sore that does not heal  New swelling or redness beyond the border of a mole    Who s at risk?  Anyone can get skin cancer. But you are at greater risk if you have:   Fair skin, light-colored hair, or light-colored eyes  Many moles or abnormal moles on your skin  A history of sunburns from sunlight or tanning beds  A family history of skin cancer  A history of exposure to radiation or chemicals  A weakened immune system  If you have had skin cancer in the past, you are at risk for recurring skin cancer.   How to check your skin  Do your monthly skin checkups in front of a full-length mirror. Check all parts of your body, including your:   Head (ears, face, neck, and scalp)  Torso (front, back, and sides)  Arms (tops, undersides, upper, and lower armpits)  Hands (palms, backs, and fingers, including  under the nails)  Buttocks and genitals  Legs (front, back, and sides)  Feet (tops, soles, toes, including under the nails, and between toes)  If you have a lot of moles, take digital photos of them each month. Make sure to take photos both up close and from a distance. These can help you see if any moles change over time.   Most skin changes are not cancer. But if you see any changes in your skin, call your doctor right away. Only he or she can diagnose a problem. If you have skin cancer, seeing your doctor can be the first step toward getting the treatment that could save your life.   [employer] last reviewed this educational content on 4/1/2019 2000-2020 [employer]. [address] [place] [address], [place] [address]. All rights reserved. This information is not intended as a substitute for professional medical care. Always follow your healthcare professional's instructions.       When should I call my doctor?  If you are worsening or not improving, please, contact us or seek urgent care as noted below.     Who should I call with questions (adults)?  [facility] (adult and pediatric): [phone]  [facility] (adult): [phone]  For urgent needs outside of business hours call the [facility] at [phone] and ask for the dermatology resident on call to be paged  If this is a medical emergency and you are unable to reach an ER, Call 110    Who should I call with questions (pediatric)?  [facility]- Pediatric Dermatology  Dr. Vee Zarate, Dr. Jack Burr, Dr. Viviane Aguilera, MITCH Boyle, Dr. Anju Dominguez, Dr. Anca Cope & Dr. Ezekiel Kaur  Non-urgent nurse triage line; [phone]- Suzanne and Giovanna BEAVERS Care Coordinatorfrancisca Farah (/Complex ) [phone]    If you need a prescription refill, please contact your pharmacy. Refills are approved or denied by our  Physicians during normal business hours, Monday through Fridays  Per office policy, refills will not be granted if you have not been seen within the past year (or sooner depending on your child's condition)    Scheduling Information:  Pediatric Appointment Scheduling and Call Center [phone]  Radiology Scheduling- [phone]  Sedation Unit Scheduling- [phone]  [place] Scheduling- General [phone]; Pediatric Dermatology [phone]  Main  Services: [phone]  Frisian: [phone]  Sri Lankan: [phone]  Hmong/English/Romanian: [phone]  Preadmission Nursing Department Fax Number: [phone] (Fax all pre-operative paperwork to this number)    For urgent matters arising during evenings, weekends, or holidays that cannot wait for normal business hours please call [phone] and ask for the dermatology resident on call to be paged.       Cryotherapy    What is it?  Use of a very cold liquid, such as liquid nitrogen, to freeze and destroy abnormal skin cells that need to be removed    What should I expect?  Tenderness and redness  A small blister that might grow and fill with dark purple blood. There may be crusting.  More than one treatment may be needed if the lesions do not go away.    How do I care for the treated area?  Gently wash the area with your hands when bathing.  Use a thin layer of Vaseline to help with healing. You may use a Band-Aid.   The area should heal within 7-10 days and may leave behind a pink or lighter color.   Do not use an antibiotic or Neosporin ointment.   You may take acetaminophen (Tylenol) for pain.     Call your doctor if you have:  Severe pain  Signs of infection (warmth, redness, cloudy yellow drainage, and or a bad smell)  Questions or concerns    Who should I call with questions?      [facility]: [phone]      [facility]: [phone]      For urgent needs outside of business  hours call the [facility] at [phone] and ask for the dermatology resident on call

## 2022-05-05 NOTE — PROGRESS NOTES
ASSESSMENT:  This is a 75 yo PM female who has OP by T scores who tried oral alendronate but did not tolerate it.  She subsequently received IV reclast last 5/2021 without side effects.  Discussed calcium and vit D.  Will get IV reclast for total of 3 possibly 5 years. Will josephine DXA fall 2022 - see in 1 year.     PLAN:  Blood work today  Schedule the IV reclast   Get DXA in fall 2022  Patient should take 1200mg of calcium/day in divided doses and vitamin D3 1000IU/day.  I suggest a vitamin D supplement  - 1000IU/day  Increase your tums to get a total of 1200mg  calcium/day  1 regular strength nate is 300mg and best to take with food  BP is up today - at home runs 130-140 - discussed goal of <130/80 and to monitor - may need BP med     Thank you for allowing me to participate in the care of your patient.  Please do not hesitate to call with questions or concerns.    Sincerely,    Jie Isabel MD, PhD  CC Deb Killian NP    Time note (e5, 40'): The total time (on the date of service) for this service was 50 minutes, including discussion/face-to-face, chart review, interpretation not otherwise reported, documentation, and updating of the computerized record.    Jie Isabel MD, PhD              Deb is a  76 year old female /post menopausal] [GR0, P0] that presents today for osteoporosis follow up patient was last seen 5/2021. She has OP by hx of fragility fracture who had severe heartburn from trying 2 months of alendronate 2020 fall.  She has been on Boniva for 5 yrs about 15 yrs ago.  Currently received IV reclast last year  5/2021 - no symptoms   Referring Physician: Referred Deb Killian NP    HPI     Have you ever had a bone density test? Yes  Where = [facility]  When = 8/2020 2014  Spine Tscore = L1-4  -0.3  Left neck Tscore = -1.0  Total left hip Tscore = -1.2  LOSS 3.9%  Right neck Tscore = -1.8  Total Right hip Tscore = -1.9  LOSS 7%  Have you received any x-ray dye or contrast in the last ten days?  No  How many servings of dairy products do you consume per day? 1-2 Type: milk  - greens -  Do you take a multi-vitamin daily? Yes  Do you take a vitamin D supplement? No  Do you take a calcium supplement daily?  Tums regular strength - 2 at bedtime   Do you take a supplement containing strontium? Yes  Are you exposed to natural sunlight at least 20 minutes three times a week? No    Social History   reports that she quit smoking about 36 years ago. Her smoking use included cigarettes. She started smoking about 62 years ago. She has a 50.00 pack-year smoking history. She has never used smokeless tobacco. She reports current alcohol use of about 1.7 standard drinks of alcohol per week. She reports that she does not use drugs.  Do you smoke cigarettes? Reformed smoker   Do you exercise? Yes. Details: walking 5 miles/5x/wk -   Do you drink alcohol? Yes. Details: nightly     Medication History  Have you used any of the following medications?    Actonel (Risedronate): No              Aredia (Pamidronate): No              Boniva (Ibindronate): 15 yrs ago  For about 5 yrs - and good response and stopped it               Didronil (Etidronate): No              Evista (Raloxifene): No              Fosamax (Alendronate): 2 months stopped last Nov 2020  - had severe heartburn   Forteo (Parathyroid hormone) injections: No   HCTZ (Thiazide): No   Calcitonin nasal spray: No   Reclast or Zometa (Zolendronate): 5/2021   Prolia (Denosumab): No    Current Outpatient Medications   Medication Sig Dispense Refill     Acetaminophen (TYLENOL PO) Take 325 mg by mouth PRN       zoledronic Acid (RECLAST) 5 MG/100ML SOLN infusion Takes once a year            Allergies   Allergen Reactions     Clindamycin Itching and Rash       Past Medical History    Family History   Problem Relation Age of Onset     Colon Cancer Mother      Breast Cancer Mother      Cancer Mother      Uterine Cancer Mother      Osteoporosis Mother      Psychotic Disorder Other  "        mother, depression; father schizophrenia     Dementia Brother 82     Coronary Artery Disease Brother      Melanoma Sister      Cerebrovascular Disease Sister      Osteoporosis Sister      Coronary Artery Disease Sister      Osteoporosis Sister      Cerebrovascular Disease Sister      Mental Illness Brother         Schizophrenia/PTSD post [place]     Skin Cancer No family hx of        ROS:  General: weight gain  Head/Eyes: none  Ears/Nose/Throat: hearing loss  Cardiovascular: none  Respiratory: none  Gastrointestinal: heartburn  Breast: none  Genitourinary: none  Sexual Function: none  Musculoskeletal: none  Skin: none  Neurological: none  Mental Health: none  Endocrine: none    Clinic Measurements  Vitals: BP (!) 163/91   Pulse 60   Ht 1.702 m (5' 7\")   Wt 71.7 kg (158 lb)   Breastfeeding No   BMI 24.75 kg/m    BMI= Body mass index is 24.75 kg/m .    Physical exam  Constitutional: Well appearing woman in no acute distress.   Psychological: appropriate mood.  Neck: No thyroidmegaly.  no carotid bruits.  Cardiovascular: regular rate and rhythm, normal S1 and S2, no murmurs, rubs or gallops, peripheral pulses full and symmetric   Respiratory: clear to auscultation, no wheezes or crackles, normal breath sounds.  Musculoskeletal: good  range of motion, no edema and motor strength is equal in the upper and lower extremities    Spine: Straight, not tender, Flexion good, Extension good, Lateral movement good, Rotational movement good  Skin: no concerning lesions, no jaundice.  Neurological: cranial nerves intact, normal strength, reflexes at patella and biceps normal, normal gait, no tremor.     LAB  Vertebra; Fracture Assessment: NA  Dexa Scan: 8/2020    FRAX Assessment Tool: [on  IV reclast   Risk Factors: age. PM, Tscores, reformed smoker  +FHx     Jie Isabel MD, PhD    "

## 2022-05-05 NOTE — PROGRESS NOTES
[facility] Dermatology Note  Encounter Date: May 5, 2022  Office Visit     Dermatology Problem List:  # AK's s/p cryo  **Low threshold to biopsy lesion on L distal forearm if it does not resolve.  # Fhx melanoma (sister)  # Longitudinal melanonychia - R great toenail - 3mm width, monitor for changes, unchanged 5/5/22     ____________________________________________    Assessment & Plan:    # AK x7  **Low threshold to biopsy lesion on L distal forearm if it does not resolve. Asked patient to monitor and return if not resolved.    - Cryotherapy today, see procedure note below     # *Longitudinal melanonychia - R great toenail - 3mm width  No changes on exam today. No treatment necessary.     # Multiple benign nevi. Solar lentigines.   - No concerning lesions today  - Monitor for ABCDEs of melanoma   - Continue sun protection - recommend SPF 30 or higher with frequent application   - Return sooner if noticing changing or symptomatic lesions    # Cherry angiomas   Discussed the natural history and benign nature of this lesion. Reassurance provided that no additional treatment is necessary.      # Family history of melanoma (sister)  - Continue photoprotection - recommend SPF 30 or higher with frequent reapplication  - Continue yearly skin exams  - Advised to monitor for changing, non-healing, bleeding, painful, changing, or otherwise symptomatic lesions    Procedures Performed:   - Cryotherapy procedure note, location(s): Superior R eyebrow x1, R forehead x1, R upper cheek x1, L forehead x1, L temple x1, L distal forearm x1, and L shin x1. After verbal consent and discussion of risks and benefits including, but not limited to, dyspigmentation/scar, blister, and pain, 7 lesion(s) was(were) treated with 1-2 mm freeze border for 1-2 cycles with liquid nitrogen. Post cryotherapy instructions were provided.    Follow-up: 1 year(s) in-person, or earlier for new or changing lesions    Staff and Scribe:      Scribe Disclosure:  I, Destiny Xuan, am serving as a scribe to document services personally performed by Kaylen Bañuelos PA-C based on data collection and the provider's statements to me.     Provider Disclosure:   The documentation recorded by the scribe accurately reflects the services I personally performed and the decisions made by me.    All risks, benefits and alternatives were discussed with patient.  Patient is in agreement and understands the assessment and plan.  All questions were answered.    Kaylen Bañuelos PA-C, [facility]  [facility] [place]: Phone: [phone], Fax: [phone]  [facility]: Phone: [phone],  Fax: [phone]  [facility]: Phone: [phone], Fax: [phone]  ____________________________________________    CC: Skin Check (States that she has a spot on the forehead)    HPI:  Ms. Deb Nelson is a(n) 76 year old female who presents today as a return patient for [facility]. Last seen by myself on 8/10/2020 at which point 1 AK on the R lateral forehead was treated with cryotherapy.     Today, the patient points to a lesion on her forehead that she would like examined. The patient denies any changes to the longitudinal melanonychia on her R great toe nail. When prompted, there is a pink spot on her L forearm that is not symptomatic. Patient is otherwise feeling well, without additional skin concerns.    Labs Reviewed:  N/A    Physical Exam:  Vitals: There were no vitals taken for this visit.  SKIN: Total skin excluding the undergarment areas was performed. The exam included the head/face, neck, both arms, chest, back, abdomen, both legs, digits and/or nails.   - There are erythematous macules with overyling adherent scale on the superior R eyebrow x1, R forehead x1, R upper cheek x1, L forehead x1, L temple x1, L distal forearm x1, and L shin x1.  - R great toenail - 3mm in  width light brown, regular appearing linear streak. No changes.   - There are dome shaped bright red papules on the abdomen.   - Multiple regular brown pigmented macules and papules are identified on the trunk and extremities.   - Scattered brown macules on sun exposed areas.  - No other lesions of concern on areas examined.     Medications:  Current Outpatient Medications   Medication     Acetaminophen (TYLENOL PO)     zoledronic Acid (RECLAST) 5 MG/100ML SOLN infusion     No current facility-administered medications for this visit.      Past Medical History:   Patient Active Problem List   Diagnosis     H/O senile osteopenia     Dyspepsia     History of cataract     Generalized osteoarthritis of multiple sites     History of meningioma of the brain     Recurrent cholesteatoma of postmastoidectomy cavity, unspecified laterality     AK (actinic keratosis)     Anatomical narrow angle borderline glaucoma     Brow ptosis     Degenerative, intervertebral disc, cervical     Family history of malignant neoplasm of gastrointestinal tract     Family history of musculoskeletal disease     Fx wrist     Meningioma (H)     Mixed conductive and sensorineural hearing loss of right ear with restricted hearing of left ear     Nonexudative age-related macular degeneration, bilateral, early dry stage     Pseudophakia, both eyes     PVD (posterior vitreous detachment)     Right shoulder pain     Senile osteoporosis     Past Medical History:   Diagnosis Date     Arthritis      Cataract      Glaucoma      Meningioma (H)     Neurosurgeon in [facility]     Osseous obstruction of eustachian tube (aka CHOLESTEOTOMA)

## 2022-05-09 ENCOUNTER — LAB (OUTPATIENT)
Dept: LAB | Facility: CLINIC | Age: 77
End: 2022-05-09
Attending: FAMILY MEDICINE
Payer: MEDICARE

## 2022-05-09 ENCOUNTER — OFFICE VISIT (OUTPATIENT)
Dept: FAMILY MEDICINE | Facility: CLINIC | Age: 77
End: 2022-05-09
Attending: FAMILY MEDICINE
Payer: MEDICARE

## 2022-05-09 VITALS
WEIGHT: 158 LBS | HEART RATE: 60 BPM | DIASTOLIC BLOOD PRESSURE: 91 MMHG | BODY MASS INDEX: 24.8 KG/M2 | SYSTOLIC BLOOD PRESSURE: 163 MMHG | HEIGHT: 67 IN

## 2022-05-09 DIAGNOSIS — M81.0 SENILE OSTEOPOROSIS: Primary | ICD-10-CM

## 2022-05-09 DIAGNOSIS — M81.0 SENILE OSTEOPOROSIS: ICD-10-CM

## 2022-05-09 LAB
ANION GAP SERPL CALCULATED.3IONS-SCNC: 7 MMOL/L (ref 3–14)
BUN SERPL-MCNC: 21 MG/DL (ref 7–30)
CALCIUM SERPL-MCNC: 9.2 MG/DL (ref 8.5–10.1)
CALCIUM SERPL-MCNC: 9.2 MG/DL (ref 8.5–10.1)
CHLORIDE BLD-SCNC: 105 MMOL/L (ref 94–109)
CO2 SERPL-SCNC: 25 MMOL/L (ref 20–32)
CREAT SERPL-MCNC: 0.72 MG/DL (ref 0.52–1.04)
GFR SERPL CREATININE-BSD FRML MDRD: 86 ML/MIN/1.73M2
GLUCOSE BLD-MCNC: 106 MG/DL (ref 70–99)
MAGNESIUM SERPL-MCNC: 2.3 MG/DL (ref 1.6–2.3)
PHOSPHATE SERPL-MCNC: 2.7 MG/DL (ref 2.5–4.5)
POTASSIUM BLD-SCNC: 4.2 MMOL/L (ref 3.4–5.3)
SODIUM SERPL-SCNC: 137 MMOL/L (ref 133–144)

## 2022-05-09 PROCEDURE — 82310 ASSAY OF CALCIUM: CPT

## 2022-05-09 PROCEDURE — 99215 OFFICE O/P EST HI 40 MIN: CPT | Mod: 24 | Performed by: FAMILY MEDICINE

## 2022-05-09 PROCEDURE — 83735 ASSAY OF MAGNESIUM: CPT

## 2022-05-09 PROCEDURE — 84100 ASSAY OF PHOSPHORUS: CPT

## 2022-05-09 PROCEDURE — 36415 COLL VENOUS BLD VENIPUNCTURE: CPT

## 2022-05-09 PROCEDURE — G0463 HOSPITAL OUTPT CLINIC VISIT: HCPCS

## 2022-05-09 RX ORDER — ZOLEDRONIC ACID 5 MG/100ML
5 INJECTION, SOLUTION INTRAVENOUS ONCE
Qty: 100 ML | Refills: 0 | Status: SHIPPED | OUTPATIENT
Start: 2022-05-09 | End: 2022-05-16

## 2022-05-09 NOTE — PATIENT INSTRUCTIONS
Blood work today  Schedule the IV reclast   Get DXA in fall 2022  Patient should take 1200mg of calcium/day in divided doses and vitamin D3 1000IU/day.  I suggest a vitamin D supplement  - 1000IU/day  Increase your tums to get a total of 1200mg  calcium/day  1 regular strength nate is 300mg and best to take with food  BP is up today - at home runs 130-140 - discussed goal of <130/80 and to monitor - may need BP med

## 2022-05-12 ENCOUNTER — MYC MEDICAL ADVICE (OUTPATIENT)
Dept: FAMILY MEDICINE | Facility: CLINIC | Age: 77
End: 2022-05-12
Payer: MEDICARE

## 2022-05-12 RX ORDER — HEPARIN SODIUM (PORCINE) LOCK FLUSH IV SOLN 100 UNIT/ML 100 UNIT/ML
5 SOLUTION INTRAVENOUS
Status: CANCELLED | OUTPATIENT
Start: 2022-05-12

## 2022-05-12 RX ORDER — HEPARIN SODIUM,PORCINE 10 UNIT/ML
5 VIAL (ML) INTRAVENOUS
Status: CANCELLED | OUTPATIENT
Start: 2022-05-12

## 2022-05-16 DIAGNOSIS — M81.0 SENILE OSTEOPOROSIS: Primary | ICD-10-CM

## 2022-05-16 RX ORDER — HEPARIN SODIUM (PORCINE) LOCK FLUSH IV SOLN 100 UNIT/ML 100 UNIT/ML
5 SOLUTION INTRAVENOUS
Status: CANCELLED | OUTPATIENT
Start: 2022-05-16

## 2022-05-16 RX ORDER — NALOXONE HYDROCHLORIDE 0.4 MG/ML
0.2 INJECTION, SOLUTION INTRAMUSCULAR; INTRAVENOUS; SUBCUTANEOUS
Status: CANCELLED | OUTPATIENT
Start: 2022-05-16

## 2022-05-16 RX ORDER — HEPARIN SODIUM,PORCINE 10 UNIT/ML
5 VIAL (ML) INTRAVENOUS
Status: CANCELLED | OUTPATIENT
Start: 2022-05-16

## 2022-05-16 RX ORDER — EPINEPHRINE 1 MG/ML
0.3 INJECTION, SOLUTION, CONCENTRATE INTRAVENOUS EVERY 5 MIN PRN
Status: CANCELLED | OUTPATIENT
Start: 2022-05-16

## 2022-05-16 RX ORDER — DIPHENHYDRAMINE HYDROCHLORIDE 50 MG/ML
50 INJECTION INTRAMUSCULAR; INTRAVENOUS
Status: CANCELLED
Start: 2022-05-16

## 2022-05-16 RX ORDER — METHYLPREDNISOLONE SODIUM SUCCINATE 125 MG/2ML
125 INJECTION, POWDER, LYOPHILIZED, FOR SOLUTION INTRAMUSCULAR; INTRAVENOUS
Status: CANCELLED
Start: 2022-05-16

## 2022-05-16 RX ORDER — ZOLEDRONIC ACID 5 MG/100ML
5 INJECTION, SOLUTION INTRAVENOUS ONCE
Status: CANCELLED
Start: 2022-05-16 | End: 2022-05-16

## 2022-05-16 RX ORDER — ALBUTEROL SULFATE 90 UG/1
1-2 AEROSOL, METERED RESPIRATORY (INHALATION)
Status: CANCELLED
Start: 2022-05-16

## 2022-05-16 RX ORDER — ALBUTEROL SULFATE 0.83 MG/ML
2.5 SOLUTION RESPIRATORY (INHALATION)
Status: CANCELLED | OUTPATIENT
Start: 2022-05-16

## 2022-05-19 ENCOUNTER — INFUSION THERAPY VISIT (OUTPATIENT)
Dept: INFUSION THERAPY | Facility: CLINIC | Age: 77
End: 2022-05-19
Attending: FAMILY MEDICINE
Payer: MEDICARE

## 2022-05-19 VITALS
BODY MASS INDEX: 24.34 KG/M2 | WEIGHT: 155.1 LBS | HEIGHT: 67 IN | DIASTOLIC BLOOD PRESSURE: 68 MMHG | SYSTOLIC BLOOD PRESSURE: 151 MMHG | HEART RATE: 58 BPM | RESPIRATION RATE: 16 BRPM | OXYGEN SATURATION: 98 %

## 2022-05-19 DIAGNOSIS — M81.0 SENILE OSTEOPOROSIS: Primary | ICD-10-CM

## 2022-05-19 PROCEDURE — 250N000011 HC RX IP 250 OP 636: Performed by: FAMILY MEDICINE

## 2022-05-19 PROCEDURE — 96365 THER/PROPH/DIAG IV INF INIT: CPT

## 2022-05-19 RX ORDER — ALBUTEROL SULFATE 0.83 MG/ML
2.5 SOLUTION RESPIRATORY (INHALATION)
Status: CANCELLED | OUTPATIENT
Start: 2022-05-19

## 2022-05-19 RX ORDER — IBUPROFEN 200 MG
CAPSULE ORAL DAILY
COMMUNITY
End: 2024-07-22

## 2022-05-19 RX ORDER — ZOLEDRONIC ACID 5 MG/100ML
5 INJECTION, SOLUTION INTRAVENOUS ONCE
Status: CANCELLED
Start: 2022-05-19 | End: 2022-05-19

## 2022-05-19 RX ORDER — HEPARIN SODIUM,PORCINE 10 UNIT/ML
5 VIAL (ML) INTRAVENOUS
Status: CANCELLED | OUTPATIENT
Start: 2022-05-19

## 2022-05-19 RX ORDER — ALBUTEROL SULFATE 90 UG/1
1-2 AEROSOL, METERED RESPIRATORY (INHALATION)
Status: CANCELLED
Start: 2022-05-19

## 2022-05-19 RX ORDER — PEDIATRIC MULTIVIT 61/D3/VIT K 1500-800
1 CAPSULE ORAL DAILY
COMMUNITY

## 2022-05-19 RX ORDER — DIPHENHYDRAMINE HYDROCHLORIDE 50 MG/ML
50 INJECTION INTRAMUSCULAR; INTRAVENOUS
Status: CANCELLED
Start: 2022-05-19

## 2022-05-19 RX ORDER — NALOXONE HYDROCHLORIDE 0.4 MG/ML
0.2 INJECTION, SOLUTION INTRAMUSCULAR; INTRAVENOUS; SUBCUTANEOUS
Status: CANCELLED | OUTPATIENT
Start: 2022-05-19

## 2022-05-19 RX ORDER — METHYLPREDNISOLONE SODIUM SUCCINATE 125 MG/2ML
125 INJECTION, POWDER, LYOPHILIZED, FOR SOLUTION INTRAMUSCULAR; INTRAVENOUS
Status: CANCELLED
Start: 2022-05-19

## 2022-05-19 RX ORDER — ZOLEDRONIC ACID 5 MG/100ML
5 INJECTION, SOLUTION INTRAVENOUS ONCE
Status: COMPLETED | OUTPATIENT
Start: 2022-05-19 | End: 2022-05-19

## 2022-05-19 RX ORDER — EPINEPHRINE 1 MG/ML
0.3 INJECTION, SOLUTION, CONCENTRATE INTRAVENOUS EVERY 5 MIN PRN
Status: CANCELLED | OUTPATIENT
Start: 2022-05-19

## 2022-05-19 RX ORDER — HEPARIN SODIUM (PORCINE) LOCK FLUSH IV SOLN 100 UNIT/ML 100 UNIT/ML
5 SOLUTION INTRAVENOUS
Status: CANCELLED | OUTPATIENT
Start: 2022-05-19

## 2022-05-19 RX ADMIN — ZOLEDRONIC ACID 5 MG: 5 INJECTION, SOLUTION INTRAVENOUS at 12:34

## 2022-05-19 ASSESSMENT — PAIN SCALES - GENERAL: PAINLEVEL: NO PAIN (0)

## 2022-05-19 NOTE — PROGRESS NOTES
Infusion Nursing Note:  Deb LAY Sanfordnsamericafortunato presents today for Reclast.    Patient seen by provider today: No   present during visit today: Not Applicable.    Note: Reclast infused over 15 minutes. Patient has received reclast in the past without any issues.      Intravenous Access:  Peripheral IV placed.    Treatment Conditions:  Results reviewed, labs MET treatment parameters, ok to proceed with treatment. Pt confirms taking vit D and Calcium supplement.      Post Infusion Assessment:  Patient tolerated infusion without incident.  Site patent and intact, free from redness, edema or discomfort.  No evidence of extravasations.  Access discontinued per protocol.       Discharge Plan:   AVS to patient via MYCHART.  Patient discharged in stable condition accompanied by: self.  Departure Mode: Ambulatory.      Татьяна Scanlon RN

## 2022-10-03 ENCOUNTER — OFFICE VISIT (OUTPATIENT)
Dept: FAMILY MEDICINE | Facility: CLINIC | Age: 77
End: 2022-10-03
Payer: MEDICARE

## 2022-10-03 VITALS
RESPIRATION RATE: 16 BRPM | DIASTOLIC BLOOD PRESSURE: 70 MMHG | BODY MASS INDEX: 24.17 KG/M2 | WEIGHT: 152.2 LBS | OXYGEN SATURATION: 98 % | HEART RATE: 62 BPM | SYSTOLIC BLOOD PRESSURE: 109 MMHG | TEMPERATURE: 98.2 F

## 2022-10-03 DIAGNOSIS — Z82.49 FAMILY HISTORY OF ISCHEMIC HEART DISEASE: ICD-10-CM

## 2022-10-03 DIAGNOSIS — R53.83 OTHER FATIGUE: Primary | ICD-10-CM

## 2022-10-03 DIAGNOSIS — Z86.011 HISTORY OF BENIGN MENINGIOMA OF BRAIN: ICD-10-CM

## 2022-10-03 LAB
ALBUMIN SERPL BCG-MCNC: 4.6 G/DL (ref 3.5–5.2)
ALP SERPL-CCNC: 41 U/L (ref 35–104)
ALT SERPL W P-5'-P-CCNC: 15 U/L (ref 10–35)
ANION GAP SERPL CALCULATED.3IONS-SCNC: 9 MMOL/L (ref 7–15)
AST SERPL W P-5'-P-CCNC: 27 U/L (ref 10–35)
BASOPHILS # BLD AUTO: 0.1 10E3/UL (ref 0–0.2)
BASOPHILS NFR BLD AUTO: 1 %
BILIRUB SERPL-MCNC: 0.4 MG/DL
BUN SERPL-MCNC: 15.1 MG/DL (ref 8–23)
CALCIUM SERPL-MCNC: 10.1 MG/DL (ref 8.8–10.2)
CHLORIDE SERPL-SCNC: 99 MMOL/L (ref 98–107)
CREAT SERPL-MCNC: 0.81 MG/DL (ref 0.51–0.95)
DEPRECATED HCO3 PLAS-SCNC: 28 MMOL/L (ref 22–29)
EOSINOPHIL # BLD AUTO: 0.3 10E3/UL (ref 0–0.7)
EOSINOPHIL NFR BLD AUTO: 7 %
ERYTHROCYTE [DISTWIDTH] IN BLOOD BY AUTOMATED COUNT: 12.5 % (ref 10–15)
GFR SERPL CREATININE-BSD FRML MDRD: 74 ML/MIN/1.73M2
GLUCOSE SERPL-MCNC: 98 MG/DL (ref 70–99)
HCT VFR BLD AUTO: 41 % (ref 35–47)
HGB BLD-MCNC: 13.2 G/DL (ref 11.7–15.7)
IMM GRANULOCYTES # BLD: 0 10E3/UL
IMM GRANULOCYTES NFR BLD: 0 %
LYMPHOCYTES # BLD AUTO: 1.4 10E3/UL (ref 0.8–5.3)
LYMPHOCYTES NFR BLD AUTO: 31 %
MCH RBC QN AUTO: 31.4 PG (ref 26.5–33)
MCHC RBC AUTO-ENTMCNC: 32.2 G/DL (ref 31.5–36.5)
MCV RBC AUTO: 98 FL (ref 78–100)
MONOCYTES # BLD AUTO: 0.4 10E3/UL (ref 0–1.3)
MONOCYTES NFR BLD AUTO: 9 %
NEUTROPHILS # BLD AUTO: 2.3 10E3/UL (ref 1.6–8.3)
NEUTROPHILS NFR BLD AUTO: 52 %
NRBC # BLD AUTO: 0 10E3/UL
NRBC BLD AUTO-RTO: 0 /100
PLATELET # BLD AUTO: 231 10E3/UL (ref 150–450)
POTASSIUM SERPL-SCNC: 4.8 MMOL/L (ref 3.4–5.3)
PROT SERPL-MCNC: 7.3 G/DL (ref 6.4–8.3)
RBC # BLD AUTO: 4.2 10E6/UL (ref 3.8–5.2)
SODIUM SERPL-SCNC: 136 MMOL/L (ref 136–145)
TSH SERPL DL<=0.005 MIU/L-ACNC: 2.4 UIU/ML (ref 0.3–4.2)
WBC # BLD AUTO: 4.4 10E3/UL (ref 4–11)

## 2022-10-03 PROCEDURE — 85004 AUTOMATED DIFF WBC COUNT: CPT | Performed by: NURSE PRACTITIONER

## 2022-10-03 PROCEDURE — 84443 ASSAY THYROID STIM HORMONE: CPT | Performed by: NURSE PRACTITIONER

## 2022-10-03 PROCEDURE — 80053 COMPREHEN METABOLIC PANEL: CPT | Performed by: NURSE PRACTITIONER

## 2022-10-03 RX ORDER — ZOLEDRONIC ACID 0.04 MG/ML
4 INJECTION, SOLUTION INTRAVENOUS ONCE
COMMUNITY

## 2022-10-03 RX ORDER — VITC/E/ZINC/COPPER/LUTEIN/ZEAX 250 MG-200
TABLET,CHEWABLE ORAL
COMMUNITY
End: 2022-12-22

## 2022-10-03 NOTE — PROGRESS NOTES
"      HPI:       Deb Nelson is a 77 year old who presents for the following:    NASIAM is a 77 year old female who presents with fatigue, headache, and lower legs aching.    1. Fatigue. She has noticed increase in generalized fatigue over the last couple months, rating it as mild in quality. It does not interfere with her ADLs but does affect her when she exercises. She takes long walks around [facility] and states it s taking her 10 minutes longer than usual to complete a lap. She also is more fatigued when gardening. She wants to rule out medical causes to her fatigue. She states her siblings have a history of heart disease and she would like to be evaluated for potential heart problems. She denies unintentional weight loss though has intentionally lost a couple pounds recently. Current life stressors/anxiety includes chronic health problems of spouse.     2. Headache. Her headaches started over the last couple months, unilaterally located in the right occipital area; pain is reproducible with pressure. She has them about two times per week and they do not generally interfere with ADLs. There are no precipitating or aggravating factors. She occasionally wakes up with the headache with it having a throbbing type of pain the morning, and goes away after taking Tylenol. The headaches do not wake her up at night. Denies stiff neck, neck or upper back pain, fever, sinus pressure, N/V, dizziness, numbness, weakness or falls, head trauma, and visual changes. No hearing changes, and the pt has a history of about 75% hearing loss in right ear r/t hx of cholestiatoma removal; significant decreased hearing in left ear; wears hearing aids. Last Neuro eval was in 2016 for cholesteatoma removal and meningioma eval. She states she was recommended to follow up with a repeat scan in 2019 but she never did due to \"feeling fine at the time.\" She would like a repeat of the MRI now due to headache onset    3. Joint/Muscle " ache in bilateral lower extremities. C/o intermittent, mild, dull ache in both feet, which is worse after walking and relieved by rest. Patient has tried no other therapies. She has a history of arthritis. She brings up these symptoms at this appointment for eval to see if there are options for addressing the problem.       PHQ-9 SCORE 7/21/2020 5/10/2021   PHQ-9 Total Score MyChart 0 -   PHQ-9 Total Score 0 0        Problem, Medication and Allergy Lists were   reviewed and are current.     Patient Active Problem List    Diagnosis Date Noted     Senile osteoporosis 05/11/2021     Priority: Medium     Family history of malignant neoplasm of gastrointestinal tract 05/10/2021     Priority: Medium     Brow ptosis 12/17/2020     Priority: Medium     Nonexudative age-related macular degeneration, bilateral, early dry stage 12/17/2020     Priority: Medium     Pseudophakia, both eyes 12/17/2020     Priority: Medium     Formatting of this note might be different from the original.  Left eye 12/2015  Right eye 2019       Mixed conductive and sensorineural hearing loss of right ear with restricted hearing of left ear 12/22/2017     Priority: Medium     AK (actinic keratosis) 10/20/2017     Priority: Medium     Meningioma (H) 12/16/2016     Priority: Medium     Last Assessment & Plan:   Formatting of this note might be different from the original.  Three year follow-up for right suboccipital meningioma without symptoms. MRI looks similar to last study and there has not been much change since 2007.  Imp: asymptomatic meningioma  Plan: R/V with MRI scan in three years with MRI       H/O senile osteopenia 11/02/2016     Priority: Medium     Had Dexa many year ago, last Dexa 2014. Stated okay already. Tool Fosamax for 5 years in the past.        Dyspepsia 11/02/2016     Priority: Medium     History of cataract 11/02/2016     Priority: Medium     Generalized osteoarthritis of multiple sites 11/02/2016     Priority: Medium      History of meningioma of the brain 11/02/2016     Priority: Medium     Recurrent cholesteatoma of postmastoidectomy cavity, unspecified laterality 11/02/2016     Priority: Medium     Fx wrist 07/01/2016     Priority: Medium     Degenerative, intervertebral disc, cervical 10/15/2012     Priority: Medium     Right shoulder pain 10/15/2012     Priority: Medium     Anatomical narrow angle borderline glaucoma 12/20/2010     Priority: Medium     Formatting of this note might be different from the original.  anatomical narrow angles OU (/2010 OUPI's done )       PVD (posterior vitreous detachment) 12/20/2010     Priority: Medium     Formatting of this note might be different from the original.  PVD (posterior vitreous detachment) OU       Family history of musculoskeletal disease 06/28/2004     Priority: Medium         Current Outpatient Medications   Medication Sig Dispense Refill     Acetaminophen (TYLENOL PO) Take 325 mg by mouth PRN       calcium carbonate 500 mg (elemental) (OSCAL 500) 1250 (500 Ca) MG TABS tablet Take by mouth daily       Multiple Vitamins-Minerals (SYSTANE ICAPS AREDS2) CAPS        mvw complete formulation (CHEWABLES) tablet Take 1 tablet by mouth daily       zoledronic acid (ZOMETA) 4 MG/100ML SOLN Inject 4 mg into the vein once           Allergies   Allergen Reactions     Clindamycin Itching and Rash        Patient Active Problem List    Diagnosis Date Noted     Senile osteoporosis 05/11/2021     Priority: Medium     Family history of malignant neoplasm of gastrointestinal tract 05/10/2021     Priority: Medium     Brow ptosis 12/17/2020     Priority: Medium     Nonexudative age-related macular degeneration, bilateral, early dry stage 12/17/2020     Priority: Medium     Pseudophakia, both eyes 12/17/2020     Priority: Medium     Formatting of this note might be different from the original.  Left eye 12/2015  Right eye 2019       Mixed conductive and sensorineural hearing loss of right ear with  restricted hearing of left ear 12/22/2017     Priority: Medium     AK (actinic keratosis) 10/20/2017     Priority: Medium     Meningioma (H) 12/16/2016     Priority: Medium     Last Assessment & Plan:   Formatting of this note might be different from the original.  Three year follow-up for right suboccipital meningioma without symptoms. MRI looks similar to last study and there has not been much change since 2007.  Imp: asymptomatic meningioma  Plan: R/V with MRI scan in three years with MRI       H/O senile osteopenia 11/02/2016     Priority: Medium     Had Dexa many year ago, last Dexa 2014. Stated okay already. Tool Fosamax for 5 years in the past.        Dyspepsia 11/02/2016     Priority: Medium     History of cataract 11/02/2016     Priority: Medium     Generalized osteoarthritis of multiple sites 11/02/2016     Priority: Medium     History of meningioma of the brain 11/02/2016     Priority: Medium     Recurrent cholesteatoma of postmastoidectomy cavity, unspecified laterality 11/02/2016     Priority: Medium     Fx wrist 07/01/2016     Priority: Medium     Degenerative, intervertebral disc, cervical 10/15/2012     Priority: Medium     Right shoulder pain 10/15/2012     Priority: Medium     Anatomical narrow angle borderline glaucoma 12/20/2010     Priority: Medium     Formatting of this note might be different from the original.  anatomical narrow angles OU (/2010 OUPI's done )       PVD (posterior vitreous detachment) 12/20/2010     Priority: Medium     Formatting of this note might be different from the original.  PVD (posterior vitreous detachment) OU       Family history of musculoskeletal disease 06/28/2004     Priority: Medium   ,     Current Outpatient Medications   Medication Sig Dispense Refill     Acetaminophen (TYLENOL PO) Take 325 mg by mouth PRN       calcium carbonate 500 mg (elemental) (OSCAL 500) 1250 (500 Ca) MG TABS tablet Take by mouth daily       Multiple Vitamins-Minerals (SYSTANE ICAPS  AREDS2) CAPS        mvw complete formulation (CHEWABLES) tablet Take 1 tablet by mouth daily       zoledronic acid (ZOMETA) 4 MG/100ML SOLN Inject 4 mg into the vein once     ,     Allergies   Allergen Reactions     Clindamycin Itching and Rash   .    Patient is an established patient of this clinic..         Review of Systems:   CONSTITUTIONAL: no fatigue, no unexpected change in weight, no fevers  SKIN: no worrisome rashes, no worrisome moles, no worrisome lesions  EYES: no acute vision problems or changes  ENT: no ear problems, no mouth problems, no throat problems  RESP: no significant cough, no shortness of breath  CV: no chest pain, no palpitations, no new or worsening peripheral edema  GI: no nausea, no vomiting, no constipation, no diarrhea  : no frequency, no dysuria, no hematuria  MUSCULOSKELETAL: limb joint mobility and movement intact and symmetric; aching in BLE  NEURO: no c/o dizziness or lightheadedness, no paresthesias or changes in sensation; occasional right occipital headache         Physical Exam:   Blood pressure 109/70, pulse 62, temperature 98.2  F (36.8  C), temperature source Oral, resp. rate 16, weight 69 kg (152 lb 3.2 oz), SpO2 98 %.    Wt Readings from Last 4 Encounters:   10/03/22 69 kg (152 lb 3.2 oz)   05/19/22 70.4 kg (155 lb 1.6 oz)   05/09/22 71.7 kg (158 lb)   07/01/21 69.5 kg (153 lb 4.8 oz)     GENERAL: healthy, alert and no distress  EYES: Eyes grossly normal to inspection, extraocular movements - intact, and PERRL. Wears glasses.   HENT: ear canals- normal; TMs- normal; Nose- normal; Mouth- no ulcers, no lesions. Wears hearing aids. Reproducible pain when pressure applied to right occipital region.   NECK: no tenderness, no adenopathy, no asymmetry, no masses, no stiffness; thyroid- normal to palpation  RESP: lungs clear to auscultation - no rales, no rhonchi, no wheezes  CV: regular rates and rhythm, normal S1 S2, no S3 or S4 and no murmur, no click or rub, no carotid  bruits  MS: extremities- no gross deformities noted, trace BLE edema  SKIN: no suspicious lesions; slight erythema on the inside of the left ankle  NEURO: strength and tone- normal, sensory exam- grossly normal, mentation- intact, speech- normal, reflexes- symmetric  PSYCH: Alert and oriented times 3; speech- coherent , normal rate and volume; able to articulate logical thoughts, able to abstract reason, no tangential thoughts, no hallucinations or delusions, affect- normal      Results:      No results from the last 24 hours    Assessment and Plan     Deb was seen today for fatigue.    Diagnoses and all orders for this visit:    Other fatigue  -     CBC with platelets differential; Future  -     TSH with free T4 reflex; Future  -     Comprehensive metabolic panel; Future  -     CBC with platelets differential  -     TSH with free T4 reflex  -     Comprehensive metabolic panel  -     Adult Cardiology Eval  Referral; Future    History of benign meningioma of brain  -     MR Brain w/o & w Contrast; Future    Family history of ischemic heart disease  -     Adult Cardiology Eval  Referral; Future      There are no discontinued medications.    Options for treatment and follow-up care were reviewed with the patient. Deb Nelson  engaged in the decision making process and verbalized understanding of the options discussed and agreed with the final plan.    Raiza Macedo, ORA student     I, Deb Killian, DNP, APRN, FNP, ANP, reviewed and verified the nurse practitioner (NP)  student's documentation of the patient's history.  I performed the exam and medical decision making activities with the NP student, who was present for learning purposes.

## 2022-10-03 NOTE — NURSING NOTE
ROOM:1  MANUELVIJAY    Preferred Name: Vijay BURCIAGA AGREED:    GUALBERTO DECLINED:  Covid/flu    77 year old  Chief Complaint   Patient presents with     Fatigue     Has been dealing with it for a couple of months and getting worse         Blood pressure 109/70, pulse 62, temperature 98.2  F (36.8  C), temperature source Oral, resp. rate 16, weight 69 kg (152 lb 3.2 oz), SpO2 98 %, not currently breastfeeding. Body mass index is 24.17 kg/m .  BP completed using cuff size:        Patient Active Problem List   Diagnosis     H/O senile osteopenia     Dyspepsia     History of cataract     Generalized osteoarthritis of multiple sites     History of meningioma of the brain     Recurrent cholesteatoma of postmastoidectomy cavity, unspecified laterality     AK (actinic keratosis)     Anatomical narrow angle borderline glaucoma     Brow ptosis     Degenerative, intervertebral disc, cervical     Family history of malignant neoplasm of gastrointestinal tract     Family history of musculoskeletal disease     Fx wrist     Meningioma (H)     Mixed conductive and sensorineural hearing loss of right ear with restricted hearing of left ear     Nonexudative age-related macular degeneration, bilateral, early dry stage     Pseudophakia, both eyes     PVD (posterior vitreous detachment)     Right shoulder pain     Senile osteoporosis       Wt Readings from Last 2 Encounters:   10/03/22 69 kg (152 lb 3.2 oz)   05/19/22 70.4 kg (155 lb 1.6 oz)     BP Readings from Last 3 Encounters:   10/03/22 109/70   05/19/22 (!) 151/68   05/09/22 (!) 163/91       Allergies   Allergen Reactions     Clindamycin Itching and Rash       Current Outpatient Medications   Medication     Acetaminophen (TYLENOL PO)     calcium carbonate 500 mg (elemental) (OSCAL 500) 1250 (500 Ca) MG TABS tablet     Multiple Vitamins-Minerals (SYSTANE ICAPS AREDS2) CAPS     mvw complete formulation (CHEWABLES) tablet     zoledronic acid (ZOMETA) 4 MG/100ML SOLN     No current  facility-administered medications for this visit.       Social History     Tobacco Use     Smoking status: Former Smoker     Packs/day: 2.00     Years: 25.00     Pack years: 50.00     Types: Cigarettes     Start date: 1960     Quit date: 1985     Years since quittin.2     Smokeless tobacco: Never Used   Vaping Use     Vaping Use: Never used   Substance Use Topics     Alcohol use: Yes     Alcohol/week: 1.7 standard drinks     Comment: 7 per week     Drug use: No       Honoring Choices - Health Care Directive Guide offered to patient at time of visit.    Health Maintenance Due   Topic Date Due     ADVANCE CARE PLANNING  Never done     HEPATITIS C SCREENING  Never done     ZOSTER IMMUNIZATION (2 of 3) 2013     FALL RISK ASSESSMENT  2018     MEDICARE ANNUAL WELLNESS VISIT  2021     COVID-19 Vaccine (2 - Moderna series) 2022     INFLUENZA VACCINE (1) 2022       Immunization History   Administered Date(s) Administered     COVID-19,PF,Moderna 2022     DTAP (<7y) 2006     Flu, Unspecified 2012     HEPA 2009, 10/21/2009     HepA-Adult 2009, 10/21/2009     HepB 2009, 10/21/2009, 2010     HepB-Adult 2009, 10/21/2009, 2010     Influenza (High Dose) 3 valent vaccine 2016, 2017, 10/16/2018     Influenza (IIV3) PF 2008, 2009, 2013     Influenza, Quad, High Dose, Pf, 65yr+ (Fluzone HD) 10/05/2021     Japanese Encephalitis IM 2012, 2012     OPV, monovalent, unspecified 2009     Pneumo Conj 13-V (2010&after) 2016, 10/16/2018     Pneumococcal 23 valent 2010, 2010     Polio, Unspecified  2009     Poliovirus, inactivated (IPV) 2009     Rabavert 2012, 2012, 2012     TDAP Vaccine (Boostrix) 2016     Tdap (Adacel,Boostrix) 2006     Typhoid IM 2009     Typhoid Oral 2009     Zoster vaccine, live 2013       No results  found for: PAP    Recent Labs   Lab Test 05/09/22  0952 05/10/21  0921 03/17/21  0840 08/18/14  1236   A1C  --   --  5.3  --    LDL  --   --  100* 81   HDL  --   --  104 104   TRIG  --   --  74 45   ALT  --   --  25 21   CR 0.72 0.82 0.79 0.76   GFRESTIMATED 86 69 73 76   GFRESTBLACK  --  80 85 >90   GFR Calc     ALBUMIN  --   --  4.1 4.0   POTASSIUM 4.2 4.6 4.7 4.5   TSH  --  2.44  --   --        PHQ-2 ( 1999 Pfizer) 10/3/2022 5/5/2022   Q1: Little interest or pleasure in doing things 0 0   Q2: Feeling down, depressed or hopeless 0 0   PHQ-2 Score 0 0   PHQ-2 Total Score (12-17 Years)- Positive if 3 or more points; Administer PHQ-A if positive - -       PHQ-9 SCORE 7/21/2020 5/10/2021   PHQ-9 Total Score MyChart 0 -   PHQ-9 Total Score 0 0       OPAL-7 SCORE 7/21/2020 5/7/2021 5/10/2021   Total Score 4 (minimal anxiety) 1 (minimal anxiety) -   Total Score 4 1 0       No flowsheet data found.    Natalia Sagastume    October 3, 2022 8:58 AM

## 2022-10-24 NOTE — PROGRESS NOTES
"NASIMA is a 77 year old female who presents with fatigue, headache, and lower legs aching.  1. Fatigue. She has noticed increase in generalized fatigue over the last couple months, rating it as mild in quality. It does not interfere with her ADLs but does affect her when she exercises. She takes long walks around [place] and states it s taking her 10 minutes longer than usual to complete a lap. She also is more fatigued when gardening. She wants to rule out medical causes to her fatigue. She states her siblings have a history of heart disease and she would like to be evaluated for potential heart problems. She denies unintentional weight loss though has intentionally lost a couple pounds recently. Current life stressors/anxiety includes chronic health problems of spouse.   2. Headache. Her headaches started over the last couple months, unilaterally located in the right occipital area; pain is reproducible with pressure. She has them about two times per week and they do not generally interfere with ADLs. There are no precipitating or aggravating factors. She occasionally wakes up with the headache with it having a throbbing type of pain the morning, and goes away after taking Tylenol. The headaches do not wake her up at night. Denies stiff neck, neck or upper back pain, fever, sinus pressure, N/V, dizziness, numbness, weakness or falls, head trauma, and visual changes. No hearing changes, and the pt has a history of about 75% hearing loss in right ear r/t hx of cholestiatoma removal; significant decreased hearing in left ear; wears hearing aids. Last Neuro eval was in 2016 for cholesteatoma removal and meningioma eval. She states she was recommended to follow up with a repeat scan in 2019 but she never did due to \"feeling fine at the time.\" She would like a repeat of the MRI now due to headache onset  3. Joint/Muscle ache in bilateral lower extremities. C/o intermittent, mild, dull ache in both feet, which is " worse after walking and relieved by rest. Patient has tried no other therapies. She has a history of arthritis. She brings up these symptoms at this appointment for eval to see if there are options for addressing the problem.

## 2022-11-01 ENCOUNTER — ANCILLARY PROCEDURE (OUTPATIENT)
Dept: MRI IMAGING | Facility: CLINIC | Age: 77
End: 2022-11-01
Attending: NURSE PRACTITIONER
Payer: MEDICARE

## 2022-11-01 DIAGNOSIS — Z86.011 HISTORY OF BENIGN MENINGIOMA OF BRAIN: ICD-10-CM

## 2022-11-01 PROCEDURE — A9585 GADOBUTROL INJECTION: HCPCS | Performed by: RADIOLOGY

## 2022-11-01 PROCEDURE — 70553 MRI BRAIN STEM W/O & W/DYE: CPT | Mod: MG | Performed by: RADIOLOGY

## 2022-11-01 PROCEDURE — G1010 CDSM STANSON: HCPCS | Performed by: RADIOLOGY

## 2022-11-01 RX ORDER — GADOBUTROL 604.72 MG/ML
7.5 INJECTION INTRAVENOUS ONCE
Status: COMPLETED | OUTPATIENT
Start: 2022-11-01 | End: 2022-11-01

## 2022-11-01 RX ADMIN — GADOBUTROL 7 ML: 604.72 INJECTION INTRAVENOUS at 18:03

## 2022-11-01 NOTE — DISCHARGE INSTRUCTIONS
MRI Contrast Discharge Instructions    The IV contrast you received today will pass out of your body in your  urine. This will happen in the next 24 hours. You will not feel this process.  Your urine will not change color.    Drink at least 4 extra glasses of water or juice today (unless your doctor  has restricted your fluids). This reduces the stress on your kidneys.  You may take your regular medicines.    If you are on dialysis: It is best to have dialysis today.    If you have a reaction: Most reactions happen right away. If you have  any new symptoms after leaving the hospital (such as hives or swelling),  call your hospital at the correct number below. Or call your family doctor.  If you have breathing distress or wheezing, call 911.    Special instructions: ***    I have read and understand the above information.    Signature:______________________________________ Date:___________    Staff:__________________________________________ Date:___________     Time:__________    [facility] Departments:    ___[place]: [phone]  ___[facility]: [phone]  ___[place]: [phone] ___[facility]: [phone]  ___[facility]: [phone]  ___[facility]: [phone]  ___[phone]  ___[facility]: [phone]  ___[phone]

## 2022-11-02 DIAGNOSIS — D32.9 MENINGIOMA (H): Primary | ICD-10-CM

## 2022-11-02 DIAGNOSIS — R51.9 COMPLAINT OF HEADACHE: ICD-10-CM

## 2022-11-19 ENCOUNTER — HEALTH MAINTENANCE LETTER (OUTPATIENT)
Age: 77
End: 2022-11-19

## 2022-12-15 DIAGNOSIS — Z13.6 CARDIOVASCULAR SCREENING; LDL GOAL LESS THAN 100: Primary | ICD-10-CM

## 2022-12-21 ASSESSMENT — ENCOUNTER SYMPTOMS
BACK PAIN: 0
POLYPHAGIA: 0
MUSCLE CRAMPS: 1
EXERCISE INTOLERANCE: 0
CHILLS: 0
DECREASED APPETITE: 0
STIFFNESS: 1
FEVER: 0
FATIGUE: 1
HYPOTENSION: 0
SYNCOPE: 0
LEG PAIN: 0
PALPITATIONS: 0
ARTHRALGIAS: 1
LIGHT-HEADEDNESS: 0
NECK PAIN: 0
INCREASED ENERGY: 0
HALLUCINATIONS: 0
POLYDIPSIA: 0
SLEEP DISTURBANCES DUE TO BREATHING: 0
WEIGHT GAIN: 0
HYPERTENSION: 0
ORTHOPNEA: 0
MUSCLE WEAKNESS: 0
JOINT SWELLING: 0
MYALGIAS: 1
NIGHT SWEATS: 0
ALTERED TEMPERATURE REGULATION: 0
WEIGHT LOSS: 0

## 2022-12-22 ENCOUNTER — OFFICE VISIT (OUTPATIENT)
Dept: CARDIOLOGY | Facility: CLINIC | Age: 77
End: 2022-12-22
Attending: NURSE PRACTITIONER
Payer: MEDICARE

## 2022-12-22 ENCOUNTER — LAB (OUTPATIENT)
Dept: LAB | Facility: CLINIC | Age: 77
End: 2022-12-22
Payer: MEDICARE

## 2022-12-22 VITALS
SYSTOLIC BLOOD PRESSURE: 125 MMHG | WEIGHT: 153.1 LBS | BODY MASS INDEX: 24.03 KG/M2 | HEART RATE: 82 BPM | DIASTOLIC BLOOD PRESSURE: 71 MMHG | OXYGEN SATURATION: 99 % | HEIGHT: 67 IN

## 2022-12-22 DIAGNOSIS — R09.89 OTHER SPECIFIED SYMPTOMS AND SIGNS INVOLVING THE CIRCULATORY AND RESPIRATORY SYSTEMS: ICD-10-CM

## 2022-12-22 DIAGNOSIS — Z82.49 FAMILY HISTORY OF ISCHEMIC HEART DISEASE: ICD-10-CM

## 2022-12-22 DIAGNOSIS — Z13.6 CARDIOVASCULAR SCREENING; LDL GOAL LESS THAN 100: ICD-10-CM

## 2022-12-22 DIAGNOSIS — R53.83 OTHER FATIGUE: ICD-10-CM

## 2022-12-22 DIAGNOSIS — E78.5 HYPERLIPIDEMIA LDL GOAL <100: Primary | ICD-10-CM

## 2022-12-22 LAB
CHOLEST SERPL-MCNC: 237 MG/DL
CREAT UR-MCNC: 127 MG/DL
CRP SERPL HS-MCNC: 0.45 MG/L
HDLC SERPL-MCNC: 108 MG/DL
LDLC SERPL CALC-MCNC: 108 MG/DL
MICROALBUMIN UR-MCNC: <12 MG/L
MICROALBUMIN/CREAT UR: NORMAL MG/G{CREAT}
NONHDLC SERPL-MCNC: 129 MG/DL
TRIGL SERPL-MCNC: 104 MG/DL

## 2022-12-22 PROCEDURE — 82043 UR ALBUMIN QUANTITATIVE: CPT | Performed by: NURSE PRACTITIONER

## 2022-12-22 PROCEDURE — 86141 C-REACTIVE PROTEIN HS: CPT | Performed by: NURSE PRACTITIONER

## 2022-12-22 PROCEDURE — 36415 COLL VENOUS BLD VENIPUNCTURE: CPT | Performed by: PATHOLOGY

## 2022-12-22 PROCEDURE — 99215 OFFICE O/P EST HI 40 MIN: CPT | Mod: 25 | Performed by: NURSE PRACTITIONER

## 2022-12-22 PROCEDURE — 82570 ASSAY OF URINE CREATININE: CPT | Performed by: NURSE PRACTITIONER

## 2022-12-22 PROCEDURE — 93000 ELECTROCARDIOGRAM COMPLETE: CPT | Performed by: INTERNAL MEDICINE

## 2022-12-22 PROCEDURE — 93922 UPR/L XTREMITY ART 2 LEVELS: CPT | Performed by: NURSE PRACTITIONER

## 2022-12-22 PROCEDURE — 80061 LIPID PANEL: CPT | Performed by: PATHOLOGY

## 2022-12-22 RX ORDER — OFLOXACIN 3 MG/ML
SOLUTION AURICULAR (OTIC)
COMMUNITY
Start: 2022-11-29

## 2022-12-22 NOTE — LETTER
12/22/2022       RE: Deb Nelson  [address]  [place] [facility] [place] [address]       Dear Colleague,    Thank you for the opportunity to participate in the care of your patient, Deb Nelson, at the [facility]ARDIOV[facility]. Please see a copy of my visit note below.      [facility] Cardiovascular Disease Prevention - Exam Note    Active Problems   Patient Active Problem List    Diagnosis Date Noted     Senile osteoporosis 05/11/2021     Priority: Medium     Family history of malignant neoplasm of gastrointestinal tract 05/10/2021     Priority: Medium     Brow ptosis 12/17/2020     Priority: Medium     Nonexudative age-related macular degeneration, bilateral, early dry stage 12/17/2020     Priority: Medium     Pseudophakia, both eyes 12/17/2020     Priority: Medium     Formatting of this note might be different from the original.  Left eye 12/2015  Right eye 2019       Mixed conductive and sensorineural hearing loss of right ear with restricted hearing of left ear 12/22/2017     Priority: Medium     AK (actinic keratosis) 10/20/2017     Priority: Medium     Meningioma (H) 12/16/2016     Priority: Medium     Last Assessment & Plan:   Formatting of this note might be different from the original.  Three year follow-up for right suboccipital meningioma without symptoms. MRI looks similar to last study and there has not been much change since 2007.  Imp: asymptomatic meningioma  Plan: R/V with MRI scan in three years with MRI       H/O senile osteopenia 11/02/2016     Priority: Medium     Had Dexa many year ago, last Dexa 2014. Stated okay already. Tool Fosamax for 5 years in the past.        Dyspepsia 11/02/2016     Priority: Medium     History of cataract 11/02/2016     Priority: Medium     Generalized osteoarthritis of multiple sites 11/02/2016     Priority: Medium     History of  meningioma of the brain 11/02/2016     Priority: Medium     Recurrent cholesteatoma of postmastoidectomy cavity, unspecified laterality 11/02/2016     Priority: Medium     Fx wrist 07/01/2016     Priority: Medium     Degenerative, intervertebral disc, cervical 10/15/2012     Priority: Medium     Right shoulder pain 10/15/2012     Priority: Medium     Anatomical narrow angle borderline glaucoma 12/20/2010     Priority: Medium     Formatting of this note might be different from the original.  anatomical narrow angles OU (/2010 OUPI's done )       PVD (posterior vitreous detachment) 12/20/2010     Priority: Medium     Formatting of this note might be different from the original.  PVD (posterior vitreous detachment) OU       Family history of musculoskeletal disease 06/28/2004     Priority: Medium       Reason For Visit   Patient here for [facility] early detection of atherosclerosis and CVD exam.    Pain Evaluation  Current history of pain associated with this visit is: denied    Cardiac risk factors:  + age  - smoking  - elevated BMI   + Family history CVD    - Diet   - Hypertension    HPI   Deb Nelson is a 77 year old year old female with a family history of heart disease.  Two of her sisters were dx with CAD at age 75 and 80. Her brother had a MI at age 80. She has a history of hyperlipidemia, meningioma, glaucoma, cataracts and arthritis.  Her primary care provider is Deb Killian DNP at the NP clinic.  She is an  of a nursing journal.  She is a nurse.  She gets up to urinate 2-3 nights/week and has leg cramps mainly in her left leg.  She gets a headache 1x/week.  Today in clinic she denies chest pain at rest, with activity, while sleeping, SOB at rest, with activity or while sleeping, palpitations, lightheadedness, lower leg edema, indigestion, headaches.  She has noticed age appropriate changes to her memory.     Nutrition assessment per patient report:   Foods with fat/cholesterol (fried foods,  fatty meats, junk food):  1x/week   Fruits and vegetables (  cup cooked, 1 cup raw):  1-3 servings of vegetables/day, 1-3 servngs of fruit/day  Caffeine (1 cup coffee, soda, etc):  3 cups of coffee/day, diet pepsi in pm  Alcohol servings (12 oz. beer, 4 oz. wine, 1  oz. in mixed drink):  1 cocktail per day, glass of wine with dinner  Special dietary habits:  increase vegetable intake  Typical breakfast:  OJ                 Lunch: cup of soup, 1@ english muffin with cheese                 Dinner: protein and vegetable, chicken, steak or fish and salad                 Snacks: no                 Drinks:  water  Activity  Patient is active walking 5 days/week for 2 miles, more active once she gets to [place]    Sleep pattern: trouble staying asleep every other night    Laboratory Results Review  We discussed laboratory results today including lipids targets and how foods influence cholesterol.    Weight  Her perceived healthy weight is 145 pounds.  A normal BMI of 25 is equal to 158 pounds.  The current BMI of 24.34 is normal weight range.      PMH   Past Medical History:   Diagnosis Date     Arthritis      Cataract      Glaucoma      Meningioma (H)     Neurosurgeon in [facility]     Osseous obstruction of eustachian tube (aka CHOLESTEOTOMA)        PSH  Past Surgical History:   Procedure Laterality Date     APPENDECTOMY  1986     CATARACT IOL, RT/LT       COLONOSCOPY  2015     COLONOSCOPY N/A 6/17/2021    Procedure: COLONOSCOPY, WITH POLYPECTOMY AND BIOPSY;  Surgeon: Mac Soto MD;  Location:  GI     ENT SURGERY  1983    cholesteotoma     ESOPHAGOSCOPY, GASTROSCOPY, DUODENOSCOPY (EGD), COMBINED N/A 6/17/2021    Procedure: ESOPHAGOGASTRODUODENOSCOPY, WITH BIOPSY;  Surgeon: Mac Soto MD;  Location: [facility] EXCISION OF CHOLESTEATOMA, MIDDLE EAR  1982       Current Meds   Current Outpatient Medications   Medication Sig Dispense Refill     Acetaminophen (TYLENOL PO) Take 325 mg by mouth PRN        calcium carbonate 500 mg (elemental) (OSCAL 500) 1250 (500 Ca) MG TABS tablet Take by mouth daily       mvw complete formulation (CHEWABLES) tablet Take 1 tablet by mouth daily       ofloxacin (FLOXIN) 0.3 % otic solution PLACE 5 DROPS INTO RIGHT EAR DAILY. USE FOR 3 DAYS PRIOR TO NEXT EAR APPT.       zoledronic acid (ZOMETA) 4 MG/100ML SOLN Inject 4 mg into the vein once         Allergies      Allergies   Allergen Reactions     Clindamycin Itching and Rash       Family Hx   Family History   Problem Relation Age of Onset     Colon Cancer Mother      Breast Cancer Mother      Uterine Cancer Mother      Osteoporosis Mother      Dementia Mother      Melanoma Sister      Cerebrovascular Disease Sister 80     Osteoporosis Sister      Coronary Artery Disease Sister 80        EF 50%, s/p ICD     Chronic Obstructive Pulmonary Disease Sister         smoker     Coronary Artery Disease Sister 75        s/p PCI/Federico     Osteoporosis Sister      Cerebrovascular Disease Sister 80     Coronary Artery Disease Sister 80        s/p PCI/DESEAN     Dementia Brother 82     Coronary Artery Disease Brother 80        s/p MI, s/p PCI/DESEAN, s/p ICD     Hypertension Brother      Mental Illness Brother         Schizophrenia/PTSD post [place],  [facility]     Psychotic Disorder Other         mother, depression; father schizophrenia     Skin Cancer No family hx of        Social History     She is  without children.     Tobacco History  History   Smoking Status     Former     Packs/day: 2.00     Years: 25.00     Types: Cigarettes     Start date: 1960     Quit date: 1985   Smokeless Tobacco     Never       ROS  CONSTITUTIONAL:  No fever, chills, or sweats. No weight gain/loss.   EENT:  No visual disturbance, ear ache, epistaxis, sore throat  ALLERGIES/IMMUNOLOGIC:  Negative  RESPIRATORY:  No cough, hemoptysis  CARDIOVASCULAR:  As per HPI  GI:  No nausea, vomiting, hematemesis, melena  :  No urinary frequency,  dysuria, or hematuria  INTEGUMENT:  Negative  PSYCHIATRIC:  Negative  NEURO:  Negative  ENDOCRINE:  Negative  MUSCULOSKELETAL:  Negative ache in left leg  Vital Signs   There were no vitals taken for this visit.      Waist: 35.5 inches  Hip: 41 inches    Physical Exam   In general, the patient is a pleasant female in no apparent distress   HEENT: NC/AT, PERRLA, EOMI, sclerae white, not injected. Nares clear, pharynx without erythema or exudate, dentition intact    Neck: No adenopathy, no thyromegaly, carotids +4/4 bilaterally without bruits,  no jugular venous distension   Lungs: Breath sounds clear bilaterally, without crackles, ronchi, or wheezes  Cor: RRR, S1S2 without murmur, rub, click, or gallop, the PMI is in the 5th ICS in the midclavicular line  Abdomen: Soft, nontender, nondistended, BS+ in all 4 quadrants, without hepatomegaly, no aorta or renal artery bruits  Extremities: No clubbing, cyanosis, or edema. DP and PT pulses +2/4 bilaterally    The 10-year ASCVD risk score (Kimberlybing COHEN Jr., et al., 2013) is: 20.8i%  Values used to calculate the score:   Age: 77 year old   Sex: female   Is Non- : No   Diabetic: No   Tobacco smoker: No   Systolic Blood Press: 128 mmHg   Is BP treated: No   HDL Cholesterol: 108 mg/dL   Total Cholesterol: 237 mg/dL    Recent Labs  Lab Results   Component Value Date    GLC 98 10/03/2022     (H) 05/09/2022    GLC 96 05/10/2021      No results found for: NTBNP  No results found for: NTBNPI   No results found for: UCRR   No results found for: MICROL   No results found for: MICROALBUMIN   No results found for: CRP   Lab Results   Component Value Date    CHOL 237 (H) 12/22/2022    CHOL 219 (H) 03/17/2021      Lab Results   Component Value Date    TRIG 104 12/22/2022    TRIG 74 03/17/2021      Lab Results   Component Value Date     12/22/2022     03/17/2021      Lab Results   Component Value Date     (H) 12/22/2022     (H)  03/17/2021      Lab Results   Component Value Date    VLDL 9 08/18/2014      Lab Results   Component Value Date    CHOLHDLRATIO 1.9 08/18/2014     Lab Results   Component Value Date    NHDL 129 12/22/2022    NHDL 115 03/17/2021        Assessment:    Cardiovascular:  Asymptomatic, she is not complaining of chest pain, recommend CAC to further evaluate her risk of CAD.  No plaque identified in carotid arteries.     Blood Pressure:  She does not take BP medication, -128/71-78 mmHg    Lipids:  She does not take a statin medication, get CAC results to determine whether or not to recommend starting a statin medication  !LIPID/HEPATIC Latest Ref Rng & Units 8/18/2014 3/17/2021 10/3/2022   CHOL <200 mg/dL 194 219 (H)    TRIGLYCERIDES <150 mg/dL 45 74    HDL >=50 mg/dL 104 104    LDL <=100 mg/dL 81 100 (H)    VLDL 0 - 30 mg/dL 9     NHDL <130 mg/dL  115    CHOLHDLRATIO 0.0 - 5.0 1.9     AST 10 - 35 U/L 21 22 27   ALT 10 - 35 U/L 21 25 15     !LIPID/HEPATIC Latest Ref Rng & Units 12/22/2022   CHOL <200 mg/dL 237 (H)   TRIGLYCERIDES <150 mg/dL 104   HDL >=50 mg/dL 108   LDL <=100 mg/dL 108 (H)   VLDL 0 - 30 mg/dL    NHDL <130 mg/dL 129   CHOLHDLRATIO 0.0 - 5.0    AST 10 - 35 U/L    ALT 10 - 35 U/L        Glucose: 98    Sleep pattern:  Sleep hygiene reviewed during clinic visit, handout given to patient    Weight Management: BMI 24.34    Return to Clinic: 3-5 years    Health Habit Summary:  Nutrition: Heart Healthy Eating:  most of the time   Exercise:  regularly active  Weight:  normal weight range  Tobacco Use:  past smoker    This case was presented to Dr. Gomes and Dr. Emily Hermosillo during our weekly conference.     Time spent for patient visit was 60 minutes with more than half the time spent on counseling and coordination of care.    EMILEE Starr CNP       CC  Patient Care Team:  Gisele Prakash MD as PCP - General (Student in organized health care education/training program)  Jie Isabel MD  PhD as MD (Family Medicine)  Lilia Aguilar [facility] as Pharmacist (Pharmacist)  Kaylen Bañuelos PA-C as Assigned Surgical Provider  Mikki Dumont APRN CNP as Nurse Practitioner (Cardiovascular Disease)  Gisele Prakash MD as Assigned PCP  VIJAY JACKSON    Answers for HPI/ROS submitted by the patient on 12/21/2022  General Symptoms: Yes  Skin Symptoms: No  HENT Symptoms: No  EYE SYMPTOMS: No  HEART SYMPTOMS: Yes  LUNG SYMPTOMS: No  INTESTINAL SYMPTOMS: No  URINARY SYMPTOMS: No  GYNECOLOGIC SYMPTOMS: No  BREAST SYMPTOMS: No  SKELETAL SYMPTOMS: Yes  BLOOD SYMPTOMS: No  NERVOUS SYSTEM SYMPTOMS: No  MENTAL HEALTH SYMPTOMS: No  Fever: No  Loss of appetite: No  Weight loss: No  Weight gain: No  Fatigue: Yes  Night sweats: No  Chills: No  Increased stress: No  Excessive hunger: No  Excessive thirst: No  Feeling hot or cold when others believe the temperature is normal: No  Loss of height: No  Post-operative complications: No  Surgical site pain: No  Hallucinations: No  Change in or Loss of Energy: No  Hyperactivity: No  Confusion: No  Chest pain or pressure: No  Fast or irregular heartbeat: No  Pain in legs with walking: No  Trouble breathing while lying down: No  Fingers or toes appear blue: No  High blood pressure: No  Low blood pressure: No  Fainting: No  Murmurs: No  Pacemaker: No  Varicose veins: No  Wake up at night with shortness of breath: No  Light-headedness: No  Exercise intolerance: No  Back pain: No  Muscle aches: Yes  Neck pain: No  Swollen joints: No  Joint pain: Yes  Bone pain: Yes  Muscle cramps: Yes  Muscle weakness: No  Joint stiffness: Yes  Bone fracture: No        Villegas Test Results    WALKING BLOOD PRESSURE RESPONSE (3 minute, 5 MET level walk)   Pre BP: 130/82 mmHg  3 min BP: 142/66 mmHg  1 min post BP: 120/80 mmHg    Pre HR: 70 bpm  3 min HR: 140 bpm  1 min post HR: 80 bpm     Test results: Walking blood pressure response to 3 minutes activity is in normal range after  adjusting for age and gender.     ABDOMINAL AORTA ULTRASOUND (< 2.5 normal, borderline 2.5-2.9, abnormal > 3)   SupraIliac 1.9 cm    SupraRenal 1.8 cm    InfraRenal Proximal 1.6 cm    InfraRenal Distal 1.8 cm      Abdominal Aorta Assessment:  normal    LEFT VENTRICULAR ULTRASOUND MEASUREMENTS (adjusted for BSA)  LVIDD 43.10 mm   Septa 9.0 mm   Posterior 9.0 mm     Left Ventricular US Assessment:  normal    Carotid Artery IMT measurements report and plaques in the small area examined:   Left IMT 0.764 mm  Plaques none    Right IMT 0.754 mm  Plaques none     Test results: Carotid arteries wall thickening is in normal range with no plaque formations present.     ECG (see tracing):  normal sinus rhythm;  rate: 76 bpm    Arterial Elasticity per age and gender (see printout):   C1 6.5 mL/mmHg x 10  abnormal   C2 1.5 mL/mmHg x 100 abnormal   Supine blood pressure: 149/74 mmHg     Test results: Arterial elasticity of the large and small size arteries is in abnormal range after adjusting for age and gender.     Villegas Disease Score: 4    Cherelle Daly    Please do not hesitate to contact me if you have any questions/concerns.     Sincerely,     EMILEE Starr CNP

## 2022-12-22 NOTE — PROGRESS NOTES
[facility] for Cardiovascular Disease Prevention - Exam Note    Active Problems   Patient Active Problem List    Diagnosis Date Noted     Senile osteoporosis 05/11/2021     Priority: Medium     Family history of malignant neoplasm of gastrointestinal tract 05/10/2021     Priority: Medium     Brow ptosis 12/17/2020     Priority: Medium     Nonexudative age-related macular degeneration, bilateral, early dry stage 12/17/2020     Priority: Medium     Pseudophakia, both eyes 12/17/2020     Priority: Medium     Formatting of this note might be different from the original.  Left eye 12/2015  Right eye 2019       Mixed conductive and sensorineural hearing loss of right ear with restricted hearing of left ear 12/22/2017     Priority: Medium     AK (actinic keratosis) 10/20/2017     Priority: Medium     Meningioma (H) 12/16/2016     Priority: Medium     Last Assessment & Plan:   Formatting of this note might be different from the original.  Three year follow-up for right suboccipital meningioma without symptoms. MRI looks similar to last study and there has not been much change since 2007.  Imp: asymptomatic meningioma  Plan: R/V with MRI scan in three years with MRI       H/O senile osteopenia 11/02/2016     Priority: Medium     Had Dexa many year ago, last Dexa 2014. Stated okay already. Tool Fosamax for 5 years in the past.        Dyspepsia 11/02/2016     Priority: Medium     History of cataract 11/02/2016     Priority: Medium     Generalized osteoarthritis of multiple sites 11/02/2016     Priority: Medium     History of meningioma of the brain 11/02/2016     Priority: Medium     Recurrent cholesteatoma of postmastoidectomy cavity, unspecified laterality 11/02/2016     Priority: Medium     Fx wrist 07/01/2016     Priority: Medium     Degenerative, intervertebral disc, cervical 10/15/2012     Priority: Medium     Right shoulder pain 10/15/2012     Priority: Medium     Anatomical narrow angle borderline glaucoma  12/20/2010     Priority: Medium     Formatting of this note might be different from the original.  anatomical narrow angles OU (/2010 OUPI's done )       PVD (posterior vitreous detachment) 12/20/2010     Priority: Medium     Formatting of this note might be different from the original.  PVD (posterior vitreous detachment) OU       Family history of musculoskeletal disease 06/28/2004     Priority: Medium       Reason For Visit   Patient here for [facility] early detection of atherosclerosis and CVD exam.    Pain Evaluation  Current history of pain associated with this visit is: denied    Cardiac risk factors:  + age  - smoking  - elevated BMI   + Family history CVD    - Diet   - Hypertension    HPI   Deb Nelson is a 77 year old year old female with a family history of heart disease.  Two of her sisters were dx with CAD at age 75 and 80. Her brother had a MI at age 80. She has a history of hyperlipidemia, meningioma, glaucoma, cataracts and arthritis.  Her primary care provider is Deb Killian DNP at the NP clinic.  She is an  of a nursing journal.  She is a nurse.  She gets up to urinate 2-3 nights/week and has leg cramps mainly in her left leg.  She gets a headache 1x/week.  Today in clinic she denies chest pain at rest, with activity, while sleeping, SOB at rest, with activity or while sleeping, palpitations, lightheadedness, lower leg edema, indigestion, headaches.  She has noticed age appropriate changes to her memory.     Nutrition assessment per patient report:   Foods with fat/cholesterol (fried foods, fatty meats, junk food):  1x/week   Fruits and vegetables (  cup cooked, 1 cup raw):  1-3 servings of vegetables/day, 1-3 servngs of fruit/day  Caffeine (1 cup coffee, soda, etc):  3 cups of coffee/day, diet pepsi in pm  Alcohol servings (12 oz. beer, 4 oz. wine, 1  oz. in mixed drink):  1 cocktail per day, glass of wine with dinner  Special dietary habits:  increase vegetable intake  Typical  breakfast:  OJ                 Lunch: cup of soup, 1@ english muffin with cheese                 Dinner: protein and vegetable, chicken, steak or fish and salad                 Snacks: no                 Drinks:  water  Activity  Patient is active walking 5 days/week for 2 miles, more active once she gets to [place]    Sleep pattern: trouble staying asleep every other night    Laboratory Results Review  We discussed laboratory results today including lipids targets and how foods influence cholesterol.    Weight  Her perceived healthy weight is 145 pounds.  A normal BMI of 25 is equal to 158 pounds.  The current BMI of 24.34 is normal weight range.      PMH   Past Medical History:   Diagnosis Date     Arthritis      Cataract      Glaucoma      Meningioma (H)     Neurosurgeon in [facility]     Osseous obstruction of eustachian tube (aka CHOLESTEOTOMA)        PSH  Past Surgical History:   Procedure Laterality Date     APPENDECTOMY  1986     CATARACT IOL, RT/LT       COLONOSCOPY  2015     COLONOSCOPY N/A 6/17/2021    Procedure: COLONOSCOPY, WITH POLYPECTOMY AND BIOPSY;  Surgeon: Mac Soto MD;  Location:  GI     ENT SURGERY  1983    cholesteotoma     ESOPHAGOSCOPY, GASTROSCOPY, DUODENOSCOPY (EGD), COMBINED N/A 6/17/2021    Procedure: ESOPHAGOGASTRODUODENOSCOPY, WITH BIOPSY;  Surgeon: Mac Soto MD;  Location: [facility] EXCISION OF CHOLESTEATOMA, MIDDLE EAR  1982       Current Meds   Current Outpatient Medications   Medication Sig Dispense Refill     Acetaminophen (TYLENOL PO) Take 325 mg by mouth PRN       calcium carbonate 500 mg (elemental) (OSCAL 500) 1250 (500 Ca) MG TABS tablet Take by mouth daily       mvw complete formulation (CHEWABLES) tablet Take 1 tablet by mouth daily       ofloxacin (FLOXIN) 0.3 % otic solution PLACE 5 DROPS INTO RIGHT EAR DAILY. USE FOR 3 DAYS PRIOR TO NEXT EAR APPT.       zoledronic acid (ZOMETA) 4 MG/100ML SOLN Inject 4 mg into the vein once         Allergies       Allergies   Allergen Reactions     Clindamycin Itching and Rash       Family Hx   Family History   Problem Relation Age of Onset     Colon Cancer Mother      Breast Cancer Mother      Uterine Cancer Mother      Osteoporosis Mother      Dementia Mother      Melanoma Sister      Cerebrovascular Disease Sister 80     Osteoporosis Sister      Coronary Artery Disease Sister 80        EF 50%, s/p ICD     Chronic Obstructive Pulmonary Disease Sister         smoker     Coronary Artery Disease Sister 75        s/p PCI/Federico     Osteoporosis Sister      Cerebrovascular Disease Sister 80     Coronary Artery Disease Sister 80        s/p PCI/DESEAN     Dementia Brother 82     Coronary Artery Disease Brother 80        s/p MI, s/p PCI/DESEAN, s/p ICD     Hypertension Brother      Mental Illness Brother         Schizophrenia/PTSD post [place],  [facility]     Psychotic Disorder Other         mother, depression; father schizophrenia     Skin Cancer No family hx of        Social History     She is  without children.     Tobacco History  History   Smoking Status     Former     Packs/day: 2.00     Years: 25.00     Types: Cigarettes     Start date: 1960     Quit date: 1985   Smokeless Tobacco     Never       ROS  CONSTITUTIONAL:  No fever, chills, or sweats. No weight gain/loss.   EENT:  No visual disturbance, ear ache, epistaxis, sore throat  ALLERGIES/IMMUNOLOGIC:  Negative  RESPIRATORY:  No cough, hemoptysis  CARDIOVASCULAR:  As per HPI  GI:  No nausea, vomiting, hematemesis, melena  :  No urinary frequency, dysuria, or hematuria  INTEGUMENT:  Negative  PSYCHIATRIC:  Negative  NEURO:  Negative  ENDOCRINE:  Negative  MUSCULOSKELETAL:  Negative ache in left leg  Vital Signs   There were no vitals taken for this visit.      Waist: 35.5 inches  Hip: 41 inches    Physical Exam   In general, the patient is a pleasant female in no apparent distress   HEENT: NC/AT, PERRLA, EOMI, sclerae white, not  injected. Nares clear, pharynx without erythema or exudate, dentition intact    Neck: No adenopathy, no thyromegaly, carotids +4/4 bilaterally without bruits,  no jugular venous distension   Lungs: Breath sounds clear bilaterally, without crackles, ronchi, or wheezes  Cor: RRR, S1S2 without murmur, rub, click, or gallop, the PMI is in the 5th ICS in the midclavicular line  Abdomen: Soft, nontender, nondistended, BS+ in all 4 quadrants, without hepatomegaly, no aorta or renal artery bruits  Extremities: No clubbing, cyanosis, or edema. DP and PT pulses +2/4 bilaterally    The 10-year ASCVD risk score (Kimberly COHEN Jr., et al., 2013) is: 20.8i%  Values used to calculate the score:   Age: 77 year old   Sex: female   Is Non- : No   Diabetic: No   Tobacco smoker: No   Systolic Blood Press: 128 mmHg   Is BP treated: No   HDL Cholesterol: 108 mg/dL   Total Cholesterol: 237 mg/dL    Recent Labs  Lab Results   Component Value Date    GLC 98 10/03/2022     (H) 05/09/2022    GLC 96 05/10/2021      No results found for: NTBNP  No results found for: NTBNPI   No results found for: UCRR   No results found for: MICROL   No results found for: MICROALBUMIN   No results found for: CRP   Lab Results   Component Value Date    CHOL 237 (H) 12/22/2022    CHOL 219 (H) 03/17/2021      Lab Results   Component Value Date    TRIG 104 12/22/2022    TRIG 74 03/17/2021      Lab Results   Component Value Date     12/22/2022     03/17/2021      Lab Results   Component Value Date     (H) 12/22/2022     (H) 03/17/2021      Lab Results   Component Value Date    VLDL 9 08/18/2014      Lab Results   Component Value Date    CHOLHDLRATIO 1.9 08/18/2014     Lab Results   Component Value Date    NHDL 129 12/22/2022    NHDL 115 03/17/2021        Assessment:    Cardiovascular:  Asymptomatic, she is not complaining of chest pain, recommend CAC to further evaluate her risk of CAD.  No plaque identified in  carotid arteries.     Blood Pressure:  She does not take BP medication, -128/71-78 mmHg    Lipids:  She does not take a statin medication, get CAC results to determine whether or not to recommend starting a statin medication  !LIPID/HEPATIC Latest Ref Rng & Units 8/18/2014 3/17/2021 10/3/2022   CHOL <200 mg/dL 194 219 (H)    TRIGLYCERIDES <150 mg/dL 45 74    HDL >=50 mg/dL 104 104    LDL <=100 mg/dL 81 100 (H)    VLDL 0 - 30 mg/dL 9     NHDL <130 mg/dL  115    CHOLHDLRATIO 0.0 - 5.0 1.9     AST 10 - 35 U/L 21 22 27   ALT 10 - 35 U/L 21 25 15     !LIPID/HEPATIC Latest Ref Rng & Units 12/22/2022   CHOL <200 mg/dL 237 (H)   TRIGLYCERIDES <150 mg/dL 104   HDL >=50 mg/dL 108   LDL <=100 mg/dL 108 (H)   VLDL 0 - 30 mg/dL    NHDL <130 mg/dL 129   CHOLHDLRATIO 0.0 - 5.0    AST 10 - 35 U/L    ALT 10 - 35 U/L        Glucose: 98    Sleep pattern:  Sleep hygiene reviewed during clinic visit, handout given to patient    Weight Management: BMI 24.34    Return to Clinic: 3-5 years    Health Habit Summary:  Nutrition: Heart Healthy Eating:  most of the time   Exercise:  regularly active  Weight:  normal weight range  Tobacco Use:  past smoker    This case was presented to Dr. Gomes and Dr. Emily Hermosillo during our weekly conference.     Time spent for patient visit was 60 minutes with more than half the time spent on counseling and coordination of care.    EMILEE Starr CNP       CC  Patient Care Team:  Gisele Prakash MD as PCP - General (Student in organized health care education/training program)  Jie Isabel MD PhD as MD (Family Medicine)  Lilia Aguilar [facility] as Pharmacist (Pharmacist)  Kaylen Bañuelos PA-C as Assigned Surgical Provider  Mikki Dumont APRN CNP as Nurse Practitioner (Cardiovascular Disease)  Gisele Prakash MD as Assigned PCP  VIJAY JACKSON    Answers for HPI/ROS submitted by the patient on 12/21/2022  General Symptoms: Yes  Skin Symptoms: No  HENT  Symptoms: No  EYE SYMPTOMS: No  HEART SYMPTOMS: Yes  LUNG SYMPTOMS: No  INTESTINAL SYMPTOMS: No  URINARY SYMPTOMS: No  GYNECOLOGIC SYMPTOMS: No  BREAST SYMPTOMS: No  SKELETAL SYMPTOMS: Yes  BLOOD SYMPTOMS: No  NERVOUS SYSTEM SYMPTOMS: No  MENTAL HEALTH SYMPTOMS: No  Fever: No  Loss of appetite: No  Weight loss: No  Weight gain: No  Fatigue: Yes  Night sweats: No  Chills: No  Increased stress: No  Excessive hunger: No  Excessive thirst: No  Feeling hot or cold when others believe the temperature is normal: No  Loss of height: No  Post-operative complications: No  Surgical site pain: No  Hallucinations: No  Change in or Loss of Energy: No  Hyperactivity: No  Confusion: No  Chest pain or pressure: No  Fast or irregular heartbeat: No  Pain in legs with walking: No  Trouble breathing while lying down: No  Fingers or toes appear blue: No  High blood pressure: No  Low blood pressure: No  Fainting: No  Murmurs: No  Pacemaker: No  Varicose veins: No  Wake up at night with shortness of breath: No  Light-headedness: No  Exercise intolerance: No  Back pain: No  Muscle aches: Yes  Neck pain: No  Swollen joints: No  Joint pain: Yes  Bone pain: Yes  Muscle cramps: Yes  Muscle weakness: No  Joint stiffness: Yes  Bone fracture: No

## 2022-12-23 LAB
ATRIAL RATE - MUSE: 76 BPM
DIASTOLIC BLOOD PRESSURE - MUSE: NORMAL MMHG
INTERPRETATION ECG - MUSE: NORMAL
P AXIS - MUSE: 1 DEGREES
PR INTERVAL - MUSE: 164 MS
QRS DURATION - MUSE: 80 MS
QT - MUSE: 372 MS
QTC - MUSE: 418 MS
R AXIS - MUSE: 2 DEGREES
SYSTOLIC BLOOD PRESSURE - MUSE: NORMAL MMHG
T AXIS - MUSE: 42 DEGREES
VENTRICULAR RATE- MUSE: 76 BPM

## 2022-12-26 NOTE — PROGRESS NOTES
Villegas Test Results    WALKING BLOOD PRESSURE RESPONSE (3 minute, 5 MET level walk)   Pre BP: 130/82 mmHg  3 min BP: 142/66 mmHg  1 min post BP: 120/80 mmHg    Pre HR: 70 bpm  3 min HR: 140 bpm  1 min post HR: 80 bpm     Test results: Walking blood pressure response to 3 minutes activity is in normal range after adjusting for age and gender.     ABDOMINAL AORTA ULTRASOUND (< 2.5 normal, borderline 2.5-2.9, abnormal > 3)   SupraIliac 1.9 cm    SupraRenal 1.8 cm    InfraRenal Proximal 1.6 cm    InfraRenal Distal 1.8 cm      Abdominal Aorta Assessment:  normal    LEFT VENTRICULAR ULTRASOUND MEASUREMENTS (adjusted for BSA)  LVIDD 43.10 mm   Septa 9.0 mm   Posterior 9.0 mm     Left Ventricular US Assessment:  normal    Carotid Artery IMT measurements report and plaques in the small area examined:   Left IMT 0.764 mm  Plaques none    Right IMT 0.754 mm  Plaques none     Test results: Carotid arteries wall thickening is in normal range with no plaque formations present.     ECG (see tracing):  normal sinus rhythm;  rate: 76 bpm    Arterial Elasticity per age and gender (see printout):   C1 6.5 mL/mmHg x 10  abnormal   C2 1.5 mL/mmHg x 100 abnormal   Supine blood pressure: 149/74 mmHg     Test results: Arterial elasticity of the large and small size arteries is in abnormal range after adjusting for age and gender.     Villegas Disease Score: 4    Cherelle Daly

## 2022-12-27 NOTE — PATIENT INSTRUCTIONS
Screening Results Summary Report     [facility] for Cardiovascular Disease Prevention    Thank you for choosing to participate in the prevention screening offered at the [facility]. Prevention screening is important part of health care.  Atherosclerosis may result in heart attacks, strokes, heart failure, peripheral artery disease and shortened life expectancy. The risk for premature development of this disease is both genetic (family history) and environmental (diet, exercise, lifestyle, etc.).  Goals of your cardiovascular prevention screening include detecting the earliest signs of blood vessel or heart abnormalities, and identifying markers for risk that can be treated if identified early. Recommendations are included to improve health habits. In some cases medication may be recommended to help slow progression of disease. Our goal is to assist you in prevention of a heart attack, stroke and other cardiovascular diseases that are the major cause of illness and mortality in our society.    Your total cholesterol and LDL (bad cholesterol) results are not in the optimal range and increase your cardiovascular risk.  Target levels depend on the presence or absence of cardiovascular disease.   Your target level of total cholesterol are less than 200 mg/dL, LDL less than 100 mg/dL, HDL 40 ml/dL or higher, and a triglyceride level of less than 150 mg/dL.   We recommend continuation of ongoing health habit modification (heart healthy nutrition, maintaining your weight within a normal weight range and an exercise routine) to maintain these levels.  An ideal weight range is a body mass index of 25 or less. Recommend getting the coronary artery calcium score results to determine whether or not to start a statin medication for prevention of coronary artery disease.     2. Your arterial elasticity (artery stiffness) is low and is consistent with abnormalities associated with the development of vascular (blood  vessel) disease and high blood pressure. Statin cholesterol medications, some blood pressure medications and healthy living habits particularly exercise are helpful to preserve artery elasticity. Monitor your blood pressure once/week, record the results and bring those results with you to your next primary care clinic visit.  Notify your primary care provider if you notice that your blood pressure readings are increasing. Omron is good home blood pressure monitor brand to purchase.  This blood pressure cuff machine can be purchased at [employer], [employer], [employer] or [employer].     3. Your diet is heart healthy and well balanced.  We recommend increasing your intake of vegetables to 4-5 servings/day and increasing your fruit intake to 3-4 servings/day and incorporating healthy fats of the the Mediterranean diet into your diet. Eating salmon and using extra virgin olive oil are good examples. Nutrients found in fruits, vegetables and whole grains have been shown to be beneficial for the long-term health of your heart and blood vessels.     4. Great job with your regular exercise regimen!  Regular exercise can help lower cholesterol, blood pressure, blood glucose and improve the health of your heart and blood vessels. The American Heart Association recommends 150 minutes of exercise per week, including strength (resistance training).  Always exercise within your comfort zone (no chest pain, able to talk comfortably).    5. We suggest that you consider incorporating 4-7-8 relaxation breathing, mindfulness stress reduction, meditation, yoga,and/or aromatherapy into your healthy lifestyle routine. All of these integrative therapies have been shown to be useful in reducing stress and promoting health. The website for the [facility] at the [facility] is [email or web address]. Taking Charge of Your Health and Wellbeing is a wonderful assessment tool to learn more about your  wellbeing.      6. A coronary artery calcium scan is recommended to further assess your cardiovascular risk. This test may or may not be covered by insurance and costs approximately $150 ($160 with tax) and can be obtained at a variety of cardiovascular centers. If lung nodules are observed with this test follow up scans may be needed for further evaluation. An order for this diagnostic test has been placed in your electronic medical record.  Call [facility] at [phone] to schedule this non-invasive diagnostic test.     7.  Return to clinic in 3-5 years.    Thank you for choosing to participate in the prevention screening at [facility] CV Prevention clinic.  Cardiovascular prevention screening is important. Atherosclerosis may result in heart attacks, strokes, heart failure, peripheral artery disease.    Mikki Dumont, DNP, APRN, FNP-C

## 2023-02-27 ENCOUNTER — OFFICE VISIT (OUTPATIENT)
Dept: FAMILY MEDICINE | Facility: CLINIC | Age: 78
End: 2023-02-27

## 2023-02-27 VITALS
TEMPERATURE: 97.1 F | SYSTOLIC BLOOD PRESSURE: 171 MMHG | HEART RATE: 59 BPM | BODY MASS INDEX: 25.07 KG/M2 | HEIGHT: 66 IN | DIASTOLIC BLOOD PRESSURE: 81 MMHG | OXYGEN SATURATION: 100 % | WEIGHT: 156 LBS

## 2023-02-27 DIAGNOSIS — M67.40 GANGLION CYST: Primary | ICD-10-CM

## 2023-02-27 NOTE — PROGRESS NOTES
Deb Nelson is a 77 year old female who presents today for follow up on some results received through the [facility].  There were 2 tests that reported out as abnormal, even though she was told that all the tests were normal.  She is interested in best understanding the tests and the results.    Deb has 2 nodules on the inner aspect of her 2nd digit of her right hand, located near and between the proximal and distal interphalangeal joints.  They are noticeable but not painful.    Deb's wife is dealing with some significant health issues, there is stress but they feel that they are getting the best care possible.  Deb primarily lives in [place], they are in town now for appointments.    Review Of Systems   ROS: 10 point ROS neg other than the symptoms noted above in the HPI.  Past Medical History:   Diagnosis Date     Arthritis      Cataract      Glaucoma      Meningioma (H)     Neurosurgeon in [facility]     Osseous obstruction of eustachian tube (aka CHOLESTEOTOMA)      Past Surgical History:   Procedure Laterality Date     APPENDECTOMY  1986     CATARACT IOL, RT/LT       COLONOSCOPY  2015     COLONOSCOPY N/A 6/17/2021    Procedure: COLONOSCOPY, WITH POLYPECTOMY AND BIOPSY;  Surgeon: Mac Soto MD;  Location:  GI     ENT SURGERY  1983    cholesteotoma     ESOPHAGOSCOPY, GASTROSCOPY, DUODENOSCOPY (EGD), COMBINED N/A 6/17/2021    Procedure: ESOPHAGOGASTRODUODENOSCOPY, WITH BIOPSY;  Surgeon: Mac Soto MD;  Location: [facility] EXCISION OF CHOLESTEATOMA, MIDDLE EAR  1982     Social History     Socioeconomic History     Marital status:      Spouse name: Not on file     Number of children: Not on file     Years of education: Not on file     Highest education level: Not on file   Occupational History     Not on file   Tobacco Use     Smoking status: Former     Packs/day: 2.00     Years: 25.00     Pack years: 50.00     Types: Cigarettes     Start date: 1/1/1960     Quit date:  "1985     Years since quittin.6     Smokeless tobacco: Never   Vaping Use     Vaping Use: Never used   Substance and Sexual Activity     Alcohol use: Yes     Alcohol/week: 1.7 standard drinks     Comment: 7 per week     Drug use: No     Sexual activity: Yes     Partners: Female     Birth control/protection: Post-menopausal   Other Topics Concern     Parent/sibling w/ CABG, MI or angioplasty before 65F 55M? Not Asked   Social History Narrative    Moved to [place] from [place] to be near partner    Retired professor of Nursing at [employer]     Social Determinants of Health     Financial Resource Strain: Not on file   Food Insecurity: Not on file   Transportation Needs: Not on file   Physical Activity: Not on file   Stress: Not on file   Social Connections: Not on file   Intimate Partner Violence: Not on file   Housing Stability: Not on file     Family History   Problem Relation Age of Onset     Colon Cancer Mother      Breast Cancer Mother      Uterine Cancer Mother      Osteoporosis Mother      Dementia Mother      Melanoma Sister      Cerebrovascular Disease Sister 80     Osteoporosis Sister      Coronary Artery Disease Sister 80        EF 50%, s/p ICD     Chronic Obstructive Pulmonary Disease Sister         smoker     Thyroid Disease Sister         not sure which type     Coronary Artery Disease Sister 75        s/p PCI/Federico     Osteoporosis Sister      Cerebrovascular Disease Sister 80     Coronary Artery Disease Sister 80        s/p PCI/DESEAN     Dementia Brother 82     Coronary Artery Disease Brother 80        s/p MI, s/p PCI/DESEAN, s/p ICD     Hypertension Brother      Mental Illness Brother         Schizophrenia/PTSD post [place],  [facility]     Psychotic Disorder Other         mother, depression; father schizophrenia     Skin Cancer No family hx of        BP (!) 171/81   Pulse 59   Temp 97.1  F (36.2  C) (Oral)   Ht 1.676 m (5' 6\")   Wt 70.8 kg (156 lb)   SpO2 " 100%   BMI 25.18 kg/m      Exam:  Constitutional: healthy, alert and no distress  Musculoskeletal: Second digit right hand, firm but compressible nodules (2) on inner aspect of finger, located between the proximal and distal interphalangeal joint  Psychiatric: mentation appears normal and affect normal/bright    Assessment/Plan:  (M67.40) Ganglion cyst  (primary encounter diagnosis)  Comment: Deb does not want these drained or managed differently right now  Plan: Deb will let me know if she wants more done    I communicated with Mikki Dumont about the [facility] reports.  She will call Deb today.  I explained to Deb that I am not able to interpret the results appropriately and it is best to have them interpreted by Mikki Dumont.        Options for treatment and follow-up care were reviewed with the patient. Patient engaged in the decision making process and verbalized understanding of the options discussed and agreed with the final plan.  Because Mikki will call her, she is fine with that follow up.

## 2023-02-27 NOTE — NURSING NOTE
"ROOM:1  VIJAY JACKSON    Preferred Name: Vijay     How did you hear about us?  Current Patient    77 year old  Chief Complaint   Patient presents with     Follow Up     Results      Finger     RT check        Blood pressure (!) 171/81, pulse 59, temperature 97.1  F (36.2  C), temperature source Oral, height 1.676 m (5' 6\"), weight 70.8 kg (156 lb), SpO2 100 %, not currently breastfeeding. Body mass index is 25.18 kg/m .  BP completed using cuff size:        Patient Active Problem List   Diagnosis     H/O senile osteopenia     Dyspepsia     History of cataract     Generalized osteoarthritis of multiple sites     History of meningioma of the brain     Recurrent cholesteatoma of postmastoidectomy cavity, unspecified laterality     AK (actinic keratosis)     Anatomical narrow angle borderline glaucoma     Brow ptosis     Degenerative, intervertebral disc, cervical     Family history of malignant neoplasm of gastrointestinal tract     Family history of musculoskeletal disease     Fx wrist     Meningioma (H)     Mixed conductive and sensorineural hearing loss of right ear with restricted hearing of left ear     Nonexudative age-related macular degeneration, bilateral, early dry stage     Pseudophakia, both eyes     PVD (posterior vitreous detachment)     Right shoulder pain     Senile osteoporosis       Wt Readings from Last 2 Encounters:   02/27/23 70.8 kg (156 lb)   12/22/22 69.4 kg (153 lb 1.6 oz)     BP Readings from Last 3 Encounters:   02/27/23 (!) 171/81   12/22/22 125/71   10/03/22 109/70       Allergies   Allergen Reactions     Clindamycin Itching and Rash       Current Outpatient Medications   Medication     Acetaminophen (TYLENOL PO)     calcium carbonate 500 mg (elemental) (OSCAL 500) 1250 (500 Ca) MG TABS tablet     mvw complete formulation (CHEWABLES) tablet     ofloxacin (FLOXIN) 0.3 % otic solution     zoledronic acid (ZOMETA) 4 MG/100ML SOLN     No current facility-administered medications for this " visit.       Social History     Tobacco Use     Smoking status: Former     Packs/day: 2.00     Years: 25.00     Pack years: 50.00     Types: Cigarettes     Start date: 1960     Quit date: 1985     Years since quittin.6     Smokeless tobacco: Never   Vaping Use     Vaping Use: Never used   Substance Use Topics     Alcohol use: Yes     Alcohol/week: 1.7 standard drinks     Comment: 7 per week     Drug use: No       Honoring Choices - Health Care Directive Guide offered to patient at time of visit.    Health Maintenance Due   Topic Date Due     ADVANCE CARE PLANNING  Never done     HEPATITIS C SCREENING  Never done     ZOSTER IMMUNIZATION (2 of 3) 2013     FALL RISK ASSESSMENT  2018     MEDICARE ANNUAL WELLNESS VISIT  2021     COVID-19 Vaccine (2 - Moderna series) 2022     INFLUENZA VACCINE (1) 2022     PHQ-2 (once per calendar year)  2023       Immunization History   Administered Date(s) Administered     COVID-19 Vaccine 18+ (Moderna) 2022     DTAP (<7y) 2006     Flu, Unspecified 2012     HEPA 2009, 10/21/2009     HepA-Adult 2009, 10/21/2009     HepB 2009, 10/21/2009, 2010     HepB-Adult 2009, 10/21/2009, 2010     Influenza (High Dose) 3 valent vaccine 2016, 2017, 10/16/2018     Influenza (IIV3) PF 2008, 2009, 2013     Influenza Vaccine 65+ (Fluzone HD) 10/05/2021     Japanese Encephalitis IM 2012, 2012     OPV, monovalent, unspecified 2009     Pneumo Conj 13-V (2010&after) 2016, 10/16/2018     Pneumococcal 23 valent 2010, 2010     Polio, Unspecified  2009     Poliovirus, inactivated (IPV) 2009     Rabavert 2012, 2012, 2012     TDAP Vaccine (Boostrix) 2016     Tdap (Adacel,Boostrix) 2006     Typhoid IM 2009     Typhoid Oral 2009     Zoster vaccine, live 2013       No results found for:  PAP    Recent Labs   Lab Test 12/22/22  0819 10/03/22  1003 05/09/22  0952 05/10/21  0921 03/17/21  0840   A1C  --   --   --   --  5.3   *  --   --   --  100*     --   --   --  104   TRIG 104  --   --   --  74   ALT  --  15  --   --  25   CR  --  0.81 0.72 0.82 0.79   GFRESTIMATED  --  74 86 69 73   GFRESTBLACK  --   --   --  80 85   ALBUMIN  --  4.6  --   --  4.1   POTASSIUM  --  4.8 4.2 4.6 4.7   TSH  --  2.40  --  2.44  --        PHQ-2 ( 1999 Pfizer) 10/3/2022 5/5/2022   Q1: Little interest or pleasure in doing things 0 0   Q2: Feeling down, depressed or hopeless 0 0   PHQ-2 Score 0 0   PHQ-2 Total Score (12-17 Years)- Positive if 3 or more points; Administer PHQ-A if positive - -       PHQ-9 SCORE 7/21/2020 5/10/2021   PHQ-9 Total Score MyChart 0 -   PHQ-9 Total Score 0 0       OPAL-7 SCORE 7/21/2020 5/7/2021 5/10/2021   Total Score 4 (minimal anxiety) 1 (minimal anxiety) -   Total Score 4 1 0       No flowsheet data found.    Michael Cortez    February 27, 2023 8:58 AM

## 2023-05-09 ENCOUNTER — HOSPITAL ENCOUNTER (OUTPATIENT)
Dept: CT IMAGING | Facility: CLINIC | Age: 78
Discharge: HOME | End: 2023-05-09
Attending: NURSE PRACTITIONER | Admitting: NURSE PRACTITIONER
Payer: MEDICARE

## 2023-05-09 DIAGNOSIS — E78.5 HYPERLIPIDEMIA LDL GOAL <100: ICD-10-CM

## 2023-05-09 DIAGNOSIS — I25.10 CALCIFICATION OF CORONARY ARTERY: Primary | ICD-10-CM

## 2023-05-09 DIAGNOSIS — R53.83 OTHER FATIGUE: ICD-10-CM

## 2023-05-09 DIAGNOSIS — Z82.49 FAMILY HISTORY OF ISCHEMIC HEART DISEASE: ICD-10-CM

## 2023-05-09 LAB — RADIOLOGIST FLAGS: NORMAL

## 2023-05-09 PROCEDURE — G1010 CDSM STANSON: HCPCS | Mod: GC | Performed by: INTERNAL MEDICINE

## 2023-05-09 PROCEDURE — G1010 CDSM STANSON: HCPCS

## 2023-05-09 PROCEDURE — 75571 CT HRT W/O DYE W/CA TEST: CPT | Mod: 26 | Performed by: INTERNAL MEDICINE

## 2023-05-09 RX ORDER — ATORVASTATIN CALCIUM 10 MG/1
10 TABLET, FILM COATED ORAL DAILY
Qty: 90 TABLET | Refills: 3 | Status: SHIPPED | OUTPATIENT
Start: 2023-05-09 | End: 2023-07-10

## 2023-05-15 ENCOUNTER — OFFICE VISIT (OUTPATIENT)
Dept: FAMILY MEDICINE | Facility: CLINIC | Age: 78
End: 2023-05-15
Payer: MEDICARE

## 2023-05-15 VITALS
TEMPERATURE: 97.6 F | DIASTOLIC BLOOD PRESSURE: 81 MMHG | HEART RATE: 62 BPM | HEIGHT: 66 IN | BODY MASS INDEX: 24.75 KG/M2 | SYSTOLIC BLOOD PRESSURE: 153 MMHG | OXYGEN SATURATION: 99 % | WEIGHT: 154 LBS

## 2023-05-15 DIAGNOSIS — K76.89 LIVER NODULE: Primary | ICD-10-CM

## 2023-05-15 NOTE — PROGRESS NOTES
Deb Nelson is a 77 year old female, accompanied by her wife Lesli, who presents today for a discussion about the results of her calcium score of 39%:    1.)  Recommendation by cardiology is to start a statin medication.  Deb is not sure she wants to start a statin due to potential side effects and long term use.    2.)  Pulmonary and hepatic nodules.    3.)  She is getting increased GERD type symptoms recently.  She notes that she is gardening more, so crouching down and notes more heartburn associated with that.  She is going to see her gastroenterologist - she had a ESOPHAGOGASTRODUODENOSCOPY, WITH BIOPSY on 6/17/21 and will follow up with that person related to her current symptoms.  She has taken TUMS and Gaviscon, she receives some relief from that.      Of note, Deb and her wife will be gone, out of the country,  for 6 weeks starting in 2 weeks.  Deb is not interested in starting any new treatments and evaluating them during that time.  She will be back in July and follow up on anything that is necessary at that time.    Review Of Systems   ROS: 10 point ROS neg other than the symptoms noted above in the HPI.      Past Medical History:   Diagnosis Date     Arthritis      Cataract      Glaucoma      Meningioma (H)     Neurosurgeon in [facility]     Osseous obstruction of eustachian tube (aka CHOLESTEOTOMA)      Past Surgical History:   Procedure Laterality Date     APPENDECTOMY  1986     CATARACT IOL, RT/LT       COLONOSCOPY  2015     COLONOSCOPY N/A 6/17/2021    Procedure: COLONOSCOPY, WITH POLYPECTOMY AND BIOPSY;  Surgeon: Mac Soto MD;  Location:  GI     ENT SURGERY  1983    cholesteotoma     ESOPHAGOSCOPY, GASTROSCOPY, DUODENOSCOPY (EGD), COMBINED N/A 6/17/2021    Procedure: ESOPHAGOGASTRODUODENOSCOPY, WITH BIOPSY;  Surgeon: Mac Soto MD;  Location: [facility] EXCISION OF CHOLESTEATOMA, MIDDLE EAR  1982     Social History     Socioeconomic History     Marital status:       Spouse name: Not on file     Number of children: Not on file     Years of education: Not on file     Highest education level: Not on file   Occupational History     Not on file   Tobacco Use     Smoking status: Former     Packs/day: 2.00     Years: 25.00     Pack years: 50.00     Types: Cigarettes     Start date: 1960     Quit date: 1985     Years since quittin.8     Smokeless tobacco: Never   Vaping Use     Vaping status: Never Used   Substance and Sexual Activity     Alcohol use: Yes     Alcohol/week: 1.7 standard drinks of alcohol     Comment: 7 per week     Drug use: No     Sexual activity: Yes     Partners: Female     Birth control/protection: Post-menopausal   Other Topics Concern     Parent/sibling w/ CABG, MI or angioplasty before 65F 55M? 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 [place]     Social Determinants of Health     Financial Resource Strain: Not on file   Food Insecurity: Not on file   Transportation Needs: Not on file   Physical Activity: Not on file   Stress: Not on file   Social Connections: Not on file   Intimate Partner Violence: Not on file   Housing Stability: Not on file     Family History   Problem Relation Age of Onset     Colon Cancer Mother      Breast Cancer Mother      Uterine Cancer Mother      Osteoporosis Mother      Dementia Mother      Melanoma Sister      Cerebrovascular Disease Sister 80     Osteoporosis Sister      Coronary Artery Disease Sister 80        EF 50%, s/p ICD     Chronic Obstructive Pulmonary Disease Sister         smoker     Thyroid Disease Sister         not sure which type     Coronary Artery Disease Sister 75        s/p PCI/Federico     Osteoporosis Sister      Cerebrovascular Disease Sister 80     Coronary Artery Disease Sister 80        s/p PCI/DESEAN     Dementia Brother 82     Coronary Artery Disease Brother 80        s/p MI, s/p PCI/DESEAN,  "s/p ICD     Hypertension Brother      Mental Illness Brother         Schizophrenia/PTSD post [place],  from a [facility]     Psychotic Disorder Other         mother, depression; father schizophrenia     Skin Cancer No family hx of        BP (!) 153/81   Pulse 62   Temp 97.6  F (36.4  C) (Oral)   Ht 1.671 m (5' 5.8\")   Wt 69.9 kg (154 lb)   SpO2 99%   BMI 25.01 kg/m      Exam:  Constitutional: healthy, alert and no distress  Psychiatric: mentation appears normal and affect normal/bright  Assessment/Plan    1. Liver nodule    - RUQ US Abdomen Limited  - right upper quadrant; Future    We discussed Deb's calcium score and decided at this time to wait on the statin medication.  She will do the ultrasound to further evaluate the liver nodules in July.  Return to clinic prn.      Options for treatment and follow-up care were reviewed with the patient. Patient engaged in the decision making process and verbalized understanding of the options discussed and agreed with the final plan.    "

## 2023-05-15 NOTE — NURSING NOTE
"ROOM:3  MANUEL VIJAY PATRIZIA    Preferred Name: Vijay     How did you hear about us?  Current Patient    77 year old  Chief Complaint   Patient presents with     results        Blood pressure (!) 153/81, pulse 62, temperature 97.6  F (36.4  C), temperature source Oral, height 1.671 m (5' 5.8\"), weight 69.9 kg (154 lb), SpO2 99 %, not currently breastfeeding. Body mass index is 25.01 kg/m .  BP completed using cuff size:        Patient Active Problem List   Diagnosis     H/O senile osteopenia     Dyspepsia     History of cataract     Generalized osteoarthritis of multiple sites     History of meningioma of the brain     Recurrent cholesteatoma of postmastoidectomy cavity, unspecified laterality     AK (actinic keratosis)     Anatomical narrow angle borderline glaucoma     Brow ptosis     Degenerative, intervertebral disc, cervical     Family history of malignant neoplasm of gastrointestinal tract     Family history of musculoskeletal disease     Fx wrist     Meningioma (H)     Mixed conductive and sensorineural hearing loss of right ear with restricted hearing of left ear     Nonexudative age-related macular degeneration, bilateral, early dry stage     Pseudophakia, both eyes     PVD (posterior vitreous detachment)     Right shoulder pain     Senile osteoporosis       Wt Readings from Last 2 Encounters:   05/15/23 69.9 kg (154 lb)   02/27/23 70.8 kg (156 lb)     BP Readings from Last 3 Encounters:   05/15/23 (!) 153/81   02/27/23 (!) 171/81   12/22/22 125/71       Allergies   Allergen Reactions     Clindamycin Itching and Rash       Current Outpatient Medications   Medication     Acetaminophen (TYLENOL PO)     atorvastatin (LIPITOR) 10 MG tablet     calcium carbonate 500 mg (elemental) (OSCAL 500) 1250 (500 Ca) MG TABS tablet     mvw complete formulation (CHEWABLES) tablet     ofloxacin (FLOXIN) 0.3 % otic solution     zoledronic acid (ZOMETA) 4 MG/100ML SOLN     No current facility-administered medications for this " visit.       Social History     Tobacco Use     Smoking status: Former     Packs/day: 2.00     Years: 25.00     Pack years: 50.00     Types: Cigarettes     Start date: 1960     Quit date: 1985     Years since quittin.8     Smokeless tobacco: Never   Vaping Use     Vaping status: Never Used   Substance Use Topics     Alcohol use: Yes     Alcohol/week: 1.7 standard drinks of alcohol     Comment: 7 per week     Drug use: No       Honoring Choices - Health Care Directive Guide offered to patient at time of visit.    Health Maintenance Due   Topic Date Due     ADVANCE CARE PLANNING  Never done     HEPATITIS C SCREENING  Never done     FALL RISK ASSESSMENT  2018     MEDICARE ANNUAL WELLNESS VISIT  2021     INFLUENZA VACCINE (1) 2022     PHQ-2 (once per calendar year)  2023     ZOSTER IMMUNIZATION (3 of 3) 2022       Immunization History   Administered Date(s) Administered     COVID-19 Bivalent 18+ (Moderna) 2023     COVID-19 Monovalent 18+ (Moderna) 2022     DTAP (<7y) 2006     Flu, Unspecified 2012     HEPA 2009, 10/21/2009     HepB 2009, 10/21/2009, 2010     Hepatitis A (ADULT 19+) 2009, 10/21/2009     Hepatitis B, Adult 2009, 10/21/2009, 2010     Influenza (High Dose) 3 valent vaccine 2016, 2017, 10/16/2018     Influenza (IIV3) PF 2008, 2009, 2013     Influenza Vaccine 65+ (Fluzone HD) 10/05/2021     Japanese Encephalitis IM 2012, 2012     OPV, monovalent, unspecified 2009     Pneumo Conj 13-V (2010&after) 2016, 10/16/2018     Pneumococcal 23 valent 2010, 2010     Polio, Unspecified  2009     Poliovirus, inactivated (IPV) 2009     Rabavert 2012, 2012, 2012     TDAP (Adacel,Boostrix) 2006     TDAP Vaccine (Boostrix) 2016     Typhoid IM 2009     Typhoid Oral 2009     Zoster vaccine, live 2013        No results found for: PAP    Recent Labs   Lab Test 12/22/22  0819 10/03/22  1003 05/09/22  0952 05/10/21  0921 03/17/21  0840   A1C  --   --   --   --  5.3   *  --   --   --  100*     --   --   --  104   TRIG 104  --   --   --  74   ALT  --  15  --   --  25   CR  --  0.81 0.72 0.82 0.79   GFRESTIMATED  --  74 86 69 73   GFRESTBLACK  --   --   --  80 85   ALBUMIN  --  4.6  --   --  4.1   POTASSIUM  --  4.8 4.2 4.6 4.7   TSH  --  2.40  --  2.44  --            10/3/2022     8:56 AM 5/5/2022    11:19 AM   PHQ-2 ( 1999 Pfizer)   Q1: Little interest or pleasure in doing things 0 0   Q2: Feeling down, depressed or hopeless 0 0   PHQ-2 Score 0 0           7/21/2020     3:45 PM 5/10/2021    10:20 AM   PHQ-9 SCORE   PHQ-9 Total Score MyChart 0    PHQ-9 Total Score 0 0           7/21/2020     3:46 PM 5/7/2021    10:03 AM 5/10/2021    10:20 AM   OPAL-7 SCORE   Total Score 4 (minimal anxiety) 1 (minimal anxiety)    Total Score 4 1 0            View : No data to display.                Michael Cortez    May 15, 2023 9:24 AM

## 2023-05-19 ENCOUNTER — OFFICE VISIT (OUTPATIENT)
Dept: DERMATOLOGY | Facility: CLINIC | Age: 78
End: 2023-05-19
Payer: MEDICARE

## 2023-05-19 DIAGNOSIS — D18.01 CHERRY ANGIOMA: ICD-10-CM

## 2023-05-19 DIAGNOSIS — Z80.8 FAMILY HISTORY OF MELANOMA: ICD-10-CM

## 2023-05-19 DIAGNOSIS — L81.4 LENTIGINES: ICD-10-CM

## 2023-05-19 DIAGNOSIS — D22.9 MULTIPLE BENIGN NEVI: ICD-10-CM

## 2023-05-19 DIAGNOSIS — L82.1 SEBORRHEIC KERATOSES: ICD-10-CM

## 2023-05-19 DIAGNOSIS — L57.0 AK (ACTINIC KERATOSIS): Primary | ICD-10-CM

## 2023-05-19 PROCEDURE — 17000 DESTRUCT PREMALG LESION: CPT | Performed by: PHYSICIAN ASSISTANT

## 2023-05-19 PROCEDURE — 17003 DESTRUCT PREMALG LES 2-14: CPT | Performed by: PHYSICIAN ASSISTANT

## 2023-05-19 PROCEDURE — 99213 OFFICE O/P EST LOW 20 MIN: CPT | Mod: 25 | Performed by: PHYSICIAN ASSISTANT

## 2023-05-19 ASSESSMENT — PAIN SCALES - GENERAL: PAINLEVEL: NO PAIN (0)

## 2023-05-19 NOTE — PATIENT INSTRUCTIONS
Patient Education     Checking for Skin Cancer  You can find cancer early by checking your skin each month. There are 3 kinds of skin cancer. They are melanoma, basal cell carcinoma, and squamous cell carcinoma. Doing monthly skin checks is the best way to find new marks or skin changes. Follow the instructions below for checking your skin.   The ABCDEs of checking moles for melanoma   Check your moles or growths for signs of melanoma using ABCDE:   Asymmetry: the sides of the mole or growth don t match  Border: the edges are ragged, notched, or blurred  Color: the color within the mole or growth varies  Diameter: the mole or growth is larger than 6 mm (size of a pencil eraser)  Evolving: the size, shape, or color of the mole or growth is changing (evolving is not shown in the images below)    Checking for other types of skin cancer  Basal cell carcinoma or squamous cell carcinoma have symptoms such as:     A spot or mole that looks different from all other marks on your skin  Changes in how an area feels, such as itching, tenderness, or pain  Changes in the skin's surface, such as oozing, bleeding, or scaliness  A sore that does not heal  New swelling or redness beyond the border of a mole    Who s at risk?  Anyone can get skin cancer. But you are at greater risk if you have:   Fair skin, light-colored hair, or light-colored eyes  Many moles or abnormal moles on your skin  A history of sunburns from sunlight or tanning beds  A family history of skin cancer  A history of exposure to radiation or chemicals  A weakened immune system  If you have had skin cancer in the past, you are at risk for recurring skin cancer.   How to check your skin  Do your monthly skin checkups in front of a full-length mirror. Check all parts of your body, including your:   Head (ears, face, neck, and scalp)  Torso (front, back, and sides)  Arms (tops, undersides, upper, and lower armpits)  Hands (palms, backs, and fingers, including  under the nails)  Buttocks and genitals  Legs (front, back, and sides)  Feet (tops, soles, toes, including under the nails, and between toes)  If you have a lot of moles, take digital photos of them each month. Make sure to take photos both up close and from a distance. These can help you see if any moles change over time.   Most skin changes are not cancer. But if you see any changes in your skin, call your doctor right away. Only he or she can diagnose a problem. If you have skin cancer, seeing your doctor can be the first step toward getting the treatment that could save your life.   [employer] last reviewed this educational content on 4/1/2019 2000-2020 [employer]. [address] [place] [address], [place] [address]. All rights reserved. This information is not intended as a substitute for professional medical care. Always follow your healthcare professional's instructions.       When should I call my doctor?  If you are worsening or not improving, please, contact us or seek urgent care as noted below.     Who should I call with questions (adults)?  [facility] (adult and pediatric): [phone]  [facility] (adult): [phone]  For urgent needs outside of business hours call the [facility] at [phone] and ask for the dermatology resident on call to be paged  If this is a medical emergency and you are unable to reach an ER, Call [phone]    Who should I call with questions (pediatric)?  [facility]- Pediatric Dermatology  Dr. Vee Zarate, Dr. Jack Burr, Dr. Viviane Aguilera, MITCH Boyle, Dr. Anju Dominguez, Dr. Anca Cope & Dr. Ezekiel Kaur  Non-urgent nurse triage line; [phone]- Suzanne and Giovanna BEAVERS Care Coordinatorfrancisca Farah (/Complex ) [phone]    If you need a prescription refill, please contact your pharmacy. Refills are approved or denied by our  Physicians during normal business hours, Monday through Fridays  Per office policy, refills will not be granted if you have not been seen within the past year (or sooner depending on your child's condition)    Scheduling Information:  Pediatric Appointment Scheduling and Call Center [phone]  Radiology Scheduling- [phone]  Sedation Unit Scheduling- [phone]  [facility] Scheduling- General [phone]; Pediatric Dermatology [phone]  Main  Services: [phone]  Welsh: [phone]  Sudanese: [phone]  Hmong/Georgian/Yakut: [phone]  Preadmission Nursing Department Fax Number: [phone] (Fax all pre-operative paperwork to this number)    For urgent matters arising during evenings, weekends, or holidays that cannot wait for normal business hours please call [phone] and ask for the dermatology resident on call to be paged. Cryotherapy    What is it?  Use of a very cold liquid, such as liquid nitrogen, to freeze and destroy abnormal skin cells that need to be removed    What should I expect?  Tenderness and redness  A small blister that might grow and fill with dark purple blood. There may be crusting.  More than one treatment may be needed if the lesions do not go away.    How do I care for the treated area?  Gently wash the area with your hands when bathing.  Use a thin layer of Vaseline to help with healing. You may use a Band-Aid.   The area should heal within 7-10 days and may leave behind a pink or lighter color.   Do not use an antibiotic or Neosporin ointment.   You may take acetaminophen (Tylenol) for pain.     Call your doctor if you have:  Severe pain  Signs of infection (warmth, redness, cloudy yellow drainage, and or a bad smell)  Questions or concerns    Who should I call with questions?      [facility]: [phone]      [facility]: [phone]      For urgent needs outside of business hours  call the [facility] at [phone] and ask for the dermatology resident on call

## 2023-05-19 NOTE — PROGRESS NOTES
[facility] Dermatology Note  Encounter Date: May 19, 2023  Office Visit     Dermatology Problem List:  1. AK's s/p cryo.  2. Fhx melanoma (sister)  3. Longitudinal melanonychia - R great toenail - 3mm width, monitor for changes, unchanged 5/5/22  ____________________________________________    Assessment & Plan:    # Actinic keratoses, right frontal hairline, forehead x2, left temple x2, left forearm   - Cryotherapy today, see procedure note below       # Family history of melanoma (sister)  - Continue photoprotection - recommend SPF 30 or higher with frequent reapplication  - Continue yearly skin exams  - Advised to monitor for changing, non-healing, bleeding, painful, changing, or otherwise symptomatic lesions     # Benign lesions: Multiple benign nevi, solar lentigos, seborrheic keratoses, cherry angiomas. Explained to patient benign nature of lesion. No treatment is necessary at this time unless the lesion changes or becomes symptomatic.   - ABCDs of melanoma were discussed and self skin checks were advised.  - Sun precaution was advised including the use of sun screens of SPF 30 or higher, sun protective clothing, and avoidance of tanning beds.      Procedures Performed:   - Cryotherapy procedure note, location(s): see above. After verbal consent and discussion of risks and benefits including, but not limited to, dyspigmentation/scar, blister, and pain, 6 lesion(s) was(were) treated with 1-2 mm freeze border for 1-2 cycles with liquid nitrogen. Post cryotherapy instructions were provided.    Follow-up: 1 year(s) in-person, or earlier for new or changing lesions    Staff and Scribe:     Scribe Disclosure:  I, Rimma Galicia, am serving as a scribe to document services personally performed by Kaylen Bañuelos PA-C based on data collection and the provider's statements to me.   Provider Disclosure:   The documentation recorded by the scribe accurately reflects the services I personally  performed and the decisions made by me.    All risks, benefits and alternatives were discussed with patient.  Patient is in agreement and understands the assessment and plan.  All questions were answered.    Kaylen Bañuelos PA-C, MPAS  [facility] [place]: Phone: [phone], Fax: [phone]  [facility]: Phone: [phone],  Fax: [phone]  [facility]: Phone: [phone], Fax: [phone]  ____________________________________________    CC: No chief complaint on file.    HPI:  Ms. Deb Nelson is a(n) 77 year old female who presents today as a return patient for FBSE. Last seen in dermatology on 5/5/22 by me, at which time patient underwent cryotherapy for treatment of AKs.    Today, patient reports a spot of concern on her lower leg that she would like examined. She also has a spot on her scalp.     Patient is otherwise feeling well, without additional skin concerns.    Labs Reviewed:  N/A    Physical Exam:  Vitals: There were no vitals taken for this visit.  SKIN: Total skin excluding the undergarment areas was performed. The exam included the head/face, neck, both arms, chest, back, abdomen, both legs, digits and/or nails.   - Mathew II  - There are erythematous macules with overyling adherent scale on the right frontal hairline, forehead, left temple, left forearm.   - There are dome shaped bright red papules on the trunk and extremities.   - Multiple regular brown pigmented macules and papules are identified on the trunk and extremities.   - Scattered brown macules on sun exposed areas.  - There are waxy stuck on tan to brown papules on the trunk and extremities.  - No other lesions of concern on areas examined.     Medications:  Current Outpatient Medications   Medication     Acetaminophen (TYLENOL PO)     atorvastatin (LIPITOR) 10 MG tablet     calcium carbonate 500 mg (elemental) (OSCAL  500) 1250 (500 Ca) MG TABS tablet     mvw complete formulation (CHEWABLES) tablet     ofloxacin (FLOXIN) 0.3 % otic solution     zoledronic acid (ZOMETA) 4 MG/100ML SOLN     No current facility-administered medications for this visit.      Past Medical History:   Patient Active Problem List   Diagnosis     H/O senile osteopenia     Dyspepsia     History of cataract     Generalized osteoarthritis of multiple sites     History of meningioma of the brain     Recurrent cholesteatoma of postmastoidectomy cavity, unspecified laterality     AK (actinic keratosis)     Anatomical narrow angle borderline glaucoma     Brow ptosis     Degenerative, intervertebral disc, cervical     Family history of malignant neoplasm of gastrointestinal tract     Family history of musculoskeletal disease     Fx wrist     Meningioma (H)     Mixed conductive and sensorineural hearing loss of right ear with restricted hearing of left ear     Nonexudative age-related macular degeneration, bilateral, early dry stage     Pseudophakia, both eyes     PVD (posterior vitreous detachment)     Right shoulder pain     Senile osteoporosis     Past Medical History:   Diagnosis Date     Arthritis      Cataract      Glaucoma      Meningioma (H)     Neurosurgeon in [facility]     Osseous obstruction of eustachian tube (aka CHOLESTEOTOMA)

## 2023-05-19 NOTE — LETTER
5/19/2023       RE: Deb Nelson  [address]  [place] [address]     Dear Colleague,    Thank you for referring your patient, Deb Nelson, to the [facility] [place] [facility]. Please see a copy of my visit note below.    [facility] Dermatology Note  Encounter Date: May 19, 2023  Office Visit     Dermatology Problem List:  1. AK's s/p cryo.  2. Fhx melanoma (sister)  3. Longitudinal melanonychia - R great toenail - 3mm width, monitor for changes, unchanged 5/5/22  ____________________________________________    Assessment & Plan:    # Actinic keratoses, right frontal hairline, forehead x2, left temple x2, left forearm   - Cryotherapy today, see procedure note below       # Family history of melanoma (sister)  - Continue photoprotection - recommend SPF 30 or higher with frequent reapplication  - Continue yearly skin exams  - Advised to monitor for changing, non-healing, bleeding, painful, changing, or otherwise symptomatic lesions     # Benign lesions: Multiple benign nevi, solar lentigos, seborrheic keratoses, cherry angiomas. Explained to patient benign nature of lesion. No treatment is necessary at this time unless the lesion changes or becomes symptomatic.   - ABCDs of melanoma were discussed and self skin checks were advised.  - Sun precaution was advised including the use of sun screens of SPF 30 or higher, sun protective clothing, and avoidance of tanning beds.      Procedures Performed:   - Cryotherapy procedure note, location(s): see above. After verbal consent and discussion of risks and benefits including, but not limited to, dyspigmentation/scar, blister, and pain, 6 lesion(s) was(were) treated with 1-2 mm freeze border for 1-2 cycles with liquid nitrogen. Post cryotherapy instructions were provided.    Follow-up: 1 year(s) in-person, or earlier for new or changing lesions    Staff and  Scribe:     Scribe Disclosure:  I, Rimma Galicia, am serving as a scribe to document services personally performed by Kaylen Bañuelos PA-C based on data collection and the provider's statements to me.   Provider Disclosure:   The documentation recorded by the scribe accurately reflects the services I personally performed and the decisions made by me.    All risks, benefits and alternatives were discussed with patient.  Patient is in agreement and understands the assessment and plan.  All questions were answered.    Kaylen Bañuelos PA-C, MPAS  [facility] [place]: Phone: [phone], Fax: [phone]  [facility] [place]: Phone: [phone],  Fax: [phone]  [facility]: Phone: [phone], Fax: [phone]  ____________________________________________    CC: No chief complaint on file.    HPI:  Ms. Deb Nelson is a(n) 77 year old female who presents today as a return patient for FBSE. Last seen in dermatology on 5/5/22 by me, at which time patient underwent cryotherapy for treatment of AKs.    Today, patient reports a spot of concern on her lower leg that she would like examined. She also has a spot on her scalp.     Patient is otherwise feeling well, without additional skin concerns.    Labs Reviewed:  N/A    Physical Exam:  Vitals: There were no vitals taken for this visit.  SKIN: Total skin excluding the undergarment areas was performed. The exam included the head/face, neck, both arms, chest, back, abdomen, both legs, digits and/or nails.   - Mathew II  - There are erythematous macules with overyling adherent scale on the right frontal hairline, forehead, left temple, left forearm.   - There are dome shaped bright red papules on the trunk and extremities.   - Multiple regular brown pigmented macules and papules are identified on the trunk and extremities.   - Scattered brown macules on sun  exposed areas.  - There are waxy stuck on tan to brown papules on the trunk and extremities.  - No other lesions of concern on areas examined.     Medications:  Current Outpatient Medications   Medication    Acetaminophen (TYLENOL PO)    atorvastatin (LIPITOR) 10 MG tablet    calcium carbonate 500 mg (elemental) (OSCAL 500) 1250 (500 Ca) MG TABS tablet    mvw complete formulation (CHEWABLES) tablet    ofloxacin (FLOXIN) 0.3 % otic solution    zoledronic acid (ZOMETA) 4 MG/100ML SOLN     No current facility-administered medications for this visit.      Past Medical History:   Patient Active Problem List   Diagnosis    H/O senile osteopenia    Dyspepsia    History of cataract    Generalized osteoarthritis of multiple sites    History of meningioma of the brain    Recurrent cholesteatoma of postmastoidectomy cavity, unspecified laterality    AK (actinic keratosis)    Anatomical narrow angle borderline glaucoma    Brow ptosis    Degenerative, intervertebral disc, cervical    Family history of malignant neoplasm of gastrointestinal tract    Family history of musculoskeletal disease    Fx wrist    Meningioma (H)    Mixed conductive and sensorineural hearing loss of right ear with restricted hearing of left ear    Nonexudative age-related macular degeneration, bilateral, early dry stage    Pseudophakia, both eyes    PVD (posterior vitreous detachment)    Right shoulder pain    Senile osteoporosis     Past Medical History:   Diagnosis Date    Arthritis     Cataract     Glaucoma     Meningioma (H)     Neurosurgeon in [facility]    Osseous obstruction of eustachian tube (aka CHOLESTEOTOMA)

## 2023-05-19 NOTE — NURSING NOTE
Chief Complaint   Patient presents with     Skin Check     Areas of concern include spots of concern on the forehead/scalp area and L lower leg      El Flores, EMT

## 2023-06-26 ENCOUNTER — MYC MEDICAL ADVICE (OUTPATIENT)
Dept: GASTROENTEROLOGY | Facility: CLINIC | Age: 78
End: 2023-06-26
Payer: MEDICARE

## 2023-06-30 NOTE — TELEPHONE ENCOUNTER
Called to remind patient of their upcoming appointment with our GI clinic, on Friday, July 14th at 7:15am with Ofe Suero . This appointment is scheduled as an in-person appt. Please arrive 15 minutes early to check in for your appointment. , if your appointment is virtual (video or telephone) you need to be in [place] for the visit. To reschedule or cancel patient to call [phone].    Emily Landon

## 2023-07-02 DIAGNOSIS — M81.0 SENILE OSTEOPOROSIS: Primary | ICD-10-CM

## 2023-07-02 NOTE — PROGRESS NOTES
ASSESSMENT:  This is a 79 yo PM female with OP by original T scores and hx of fragility fx who tried oral alendronate but did not tolerate it.  She subsequently had IV reclast in 5/2021 and 5/2022.  She is due for IV reclast for a total of 3 yrs.  Her recent DXA showed osteopenia in the right hip.  We discussed increasing her calcium and starting vit D.  Will get blood work today.      PLAN:  Blood work today  Donna should call you to give you the phone # for the infusion center for the IV reclast  DXA in 7/2025  at the [facility] Phone: [phone]    - you need to call to get this ordered  See me in 1 yr    Patient should take 1200mg of calcium/day in divided doses (diet and supplements combined)  and vitamin D3 1000IU/day.  Get extra strength tums - need to take with food     Thank you for allowing me to participate in the care of your patient.  Please do not hesitate to call with questions or concerns.    Sincerely,    Jie Isabel MD, PhD  CC Deb Killian NP    Time note (e5, 40'): The total of my time (on the date of service) for this service was 40 minutes, including discussion/face-to-face, chart review, interpretation not otherwise reported, documentation, and updating of the computerized record.          Deb is a  77 year old female post menopausal] [GR0, P0] that presents today for osteoporosis follow up patient was last seen 5/2022. She has OP by hx of fragility fracture who had severe heartburn from trying 2 months of alendronate 2020 fall.  She then was on Boniva for 5 yrs about 15 yrs ago.  Currently received IV reclast last year  5/2021 and 5/2022 - no symptoms.   Referring Physician: Deb Killian NP     HPI     Have you ever had a bone density test? Yes  Where = [place] - different machine  When = 7/2023 8/2020 2014   Spine Tscore = L1-2  -1.0  Left neck Tscore = -0.9  Total left hip Tscore = NA  Right neck Tscore = -1.6  Total Right hip Tscore = NA   Have you received any x-ray dye or  contrast in the last ten days? No  How many servings of dairy products do you consume per day? 1-2 Type: 1/2 c milk; green vegetables   Do you take a multi-vitamin daily? No  Do you take a vitamin D supplement? No -  5/2021 vit D 37    Do you take a calcium supplement daily?  tums  Bid    Do you take a supplement containing strontium? No  Are you exposed to natural sunlight at least 20 minutes three times a week? Yes    Social History   reports that she quit smoking about 37 years ago. Her smoking use included cigarettes. She started smoking about 63 years ago. She has a 50.00 pack-year smoking history. She has never used smokeless tobacco. She reports current alcohol use of about 1.7 standard drinks of alcohol per week. She reports that she does not use drugs.  Do you smoke cigarettes? Reformed smoker   Do you exercise? Yes. Details: walk 3-4 mi/day  Aerobics   Do you drink alcohol? Yes. Details: 1-2/day      Medication History  Have you used any of the following medications?   Actonel (Risedronate): No              Aredia (Pamidronate): No              Boniva (Ibindronate): 15 yrs ago  For about 5 yrs - and good response and stopped it               Didronil (Etidronate): No              Evista (Raloxifene): No              Fosamax (Alendronate): 2 months stopped last Nov 2020  - had severe heartburn              Forteo (Parathyroid hormone) injections: No              HCTZ (Thiazide): No              Calcitonin nasal spray: No              Reclast or Zometa (Zolendronate): 5/2021,  5/2022              Prolia (Denosumab): No     Current Outpatient Medications   Medication Sig Dispense Refill     Acetaminophen (TYLENOL PO) Take 325 mg by mouth PRN       atorvastatin (LIPITOR) 10 MG tablet Take 1 tablet (10 mg) by mouth daily 90 tablet 3     calcium carbonate 500 mg (elemental) (OSCAL 500) 1250 (500 Ca) MG TABS tablet Take by mouth daily       mvw complete formulation (CHEWABLES) tablet Take 1 tablet by mouth daily   "     ofloxacin (FLOXIN) 0.3 % otic solution PLACE 5 DROPS INTO RIGHT EAR DAILY. USE FOR 3 DAYS PRIOR TO NEXT EAR APPT.       zoledronic acid (ZOMETA) 4 MG/100ML SOLN Inject 4 mg into the vein once            Allergies   Allergen Reactions     Clindamycin Itching and Rash       Past Medical History    Family History   Problem Relation Age of Onset     Colon Cancer Mother      Breast Cancer Mother      Uterine Cancer Mother      Osteoporosis Mother      Dementia Mother      Melanoma Sister      Cerebrovascular Disease Sister 80     Osteoporosis Sister      Coronary Artery Disease Sister 80        EF 50%, s/p ICD     Chronic Obstructive Pulmonary Disease Sister         smoker     Thyroid Disease Sister         not sure which type     Coronary Artery Disease Sister 75        s/p PCI/Federico     Osteoporosis Sister      Cerebrovascular Disease Sister 80     Coronary Artery Disease Sister 80        s/p PCI/DESEAN     Dementia Brother 82     Coronary Artery Disease Brother 80        s/p MI, s/p PCI/DESEAN, s/p ICD     Hypertension Brother      Mental Illness Brother         Schizophrenia/PTSD post [place],  from [facility]     Psychotic Disorder Other         mother, depression; father schizophrenia     Skin Cancer No family hx of        ROS:  General: none  Head/Eyes: none  Ears/Nose/Throat: none  Cardiovascular: none  Respiratory: none  Gastrointestinal: none  Breast: none  Genitourinary: none  Sexual Function: none  Musculoskeletal: none  Skin: none  Neurological: none  Mental Health: none  Endocrine: none    Clinic Measurements  Vitals: /78   Pulse 76   Ht 1.689 m (5' 6.5\")   Wt 71.2 kg (157 lb)   BMI 24.96 kg/m    BMI= Body mass index is 24.96 kg/m .    Physical exam  Constitutional: Well appearing woman in no acute distress.   Psychological: appropriate mood.  Neck: No thyroidmegaly.  no carotid bruits.  Cardiovascular: regular rate and rhythm, normal S1 and S2, no murmurs, rubs or " gallops  Respiratory: clear to auscultation, no wheezes or crackles, normal breath sounds.  Musculoskeletal: full range of motion, no edema and motor strength is equal in the upper and lower extremities    Spine: Straight, not tender, Flexion adequate, Extension adequate, Lateral movement good, Rotational movement good  Skin: no concerning lesions, no jaundice.  Neurological: cranial nerves intact, normal strength, reflexes at patella and biceps normal, normal gait, no tremor.     LAB  Vertebra; Fracture Assessment: NA  Dexa Scan: 7/2023    FRAX Assessment Tool: [N/A, IV reclast   Risk Factors: age. PM, Tscores, reformed smoker  +FHx       Jie Isabel MD, PhD

## 2023-07-05 ENCOUNTER — ANCILLARY PROCEDURE (OUTPATIENT)
Dept: BONE DENSITY | Facility: CLINIC | Age: 78
End: 2023-07-05
Attending: FAMILY MEDICINE
Payer: MEDICARE

## 2023-07-05 DIAGNOSIS — M81.0 SENILE OSTEOPOROSIS: ICD-10-CM

## 2023-07-05 PROCEDURE — 77080 DXA BONE DENSITY AXIAL: CPT | Performed by: INTERNAL MEDICINE

## 2023-07-06 ENCOUNTER — DOCUMENTATION ONLY (OUTPATIENT)
Dept: FAMILY MEDICINE | Facility: CLINIC | Age: 78
End: 2023-07-06
Payer: MEDICARE

## 2023-07-06 NOTE — PROGRESS NOTES
7-7-23 note to pt   Your results of the DXA shows osteopenia - it can't be compared to previous scans as it was on 2 different machines. It looks like you had IV reclast in 5/2021 and 5/2022. Usually we do 3 yrs of IV reclast for someone who has osteoporosis ( even though your DXA now shows osteopenia we stay with the original diagnosis of osteoporosis)   we can schedule that if you want - let me know.  And sometime this fall I should see you - we can wait till then to talk about it also.    Jie Isabel MD, PhD

## 2023-07-10 ENCOUNTER — OFFICE VISIT (OUTPATIENT)
Dept: FAMILY MEDICINE | Facility: CLINIC | Age: 78
End: 2023-07-10
Attending: FAMILY MEDICINE
Payer: MEDICARE

## 2023-07-10 ENCOUNTER — LAB (OUTPATIENT)
Dept: LAB | Facility: CLINIC | Age: 78
End: 2023-07-10
Attending: FAMILY MEDICINE
Payer: MEDICARE

## 2023-07-10 VITALS
HEIGHT: 67 IN | DIASTOLIC BLOOD PRESSURE: 78 MMHG | WEIGHT: 157 LBS | HEART RATE: 76 BPM | BODY MASS INDEX: 24.64 KG/M2 | SYSTOLIC BLOOD PRESSURE: 116 MMHG

## 2023-07-10 DIAGNOSIS — I25.10 CALCIFICATION OF CORONARY ARTERY: ICD-10-CM

## 2023-07-10 DIAGNOSIS — M81.0 SENILE OSTEOPOROSIS: ICD-10-CM

## 2023-07-10 DIAGNOSIS — M81.0 SENILE OSTEOPOROSIS: Primary | ICD-10-CM

## 2023-07-10 DIAGNOSIS — E78.5 HYPERLIPIDEMIA LDL GOAL <100: ICD-10-CM

## 2023-07-10 LAB
ANION GAP SERPL CALCULATED.3IONS-SCNC: 13 MMOL/L (ref 7–15)
BUN SERPL-MCNC: 16.6 MG/DL (ref 8–23)
CALCIUM SERPL-MCNC: 9.6 MG/DL (ref 8.8–10.2)
CHLORIDE SERPL-SCNC: 104 MMOL/L (ref 98–107)
CHOLEST SERPL-MCNC: 206 MG/DL
CREAT SERPL-MCNC: 0.79 MG/DL (ref 0.51–0.95)
DEPRECATED CALCIDIOL+CALCIFEROL SERPL-MC: 31 UG/L (ref 20–75)
DEPRECATED HCO3 PLAS-SCNC: 24 MMOL/L (ref 22–29)
GFR SERPL CREATININE-BSD FRML MDRD: 76 ML/MIN/1.73M2
GLUCOSE SERPL-MCNC: 100 MG/DL (ref 70–99)
HDLC SERPL-MCNC: 93 MG/DL
LDLC SERPL CALC-MCNC: 93 MG/DL
NONHDLC SERPL-MCNC: 113 MG/DL
POTASSIUM SERPL-SCNC: 4.4 MMOL/L (ref 3.4–5.3)
SODIUM SERPL-SCNC: 141 MMOL/L (ref 136–145)
TRIGL SERPL-MCNC: 99 MG/DL

## 2023-07-10 PROCEDURE — 36415 COLL VENOUS BLD VENIPUNCTURE: CPT

## 2023-07-10 PROCEDURE — 99215 OFFICE O/P EST HI 40 MIN: CPT | Performed by: FAMILY MEDICINE

## 2023-07-10 PROCEDURE — G0463 HOSPITAL OUTPT CLINIC VISIT: HCPCS | Performed by: FAMILY MEDICINE

## 2023-07-10 PROCEDURE — 82306 VITAMIN D 25 HYDROXY: CPT

## 2023-07-10 PROCEDURE — 82310 ASSAY OF CALCIUM: CPT

## 2023-07-10 PROCEDURE — 80061 LIPID PANEL: CPT

## 2023-07-10 ASSESSMENT — PAIN SCALES - GENERAL: PAINLEVEL: NO PAIN (0)

## 2023-07-10 NOTE — PATIENT INSTRUCTIONS
Blood work today  Donna should call you to give you the phone # for the infusion center for the IV reclast  DXA in 7/2025  at the [facility] Phone: [phone]    - you need to call to get this ordered  See me in 1 yr    Patient should take 1200mg of calcium/day in divided doses (diet and supplements combined)  and vitamin D3 1000IU/day.  Get extra strength tums - need to take with food

## 2023-07-13 NOTE — PROGRESS NOTES
Gastroenterology Visit for: Deb Nelson 1945   MRN: [id number]     Reason for Visit:  chief complaint    Referred by: Self  / No address on file  Patient Care Team:  Deb Killian NP as PCP - General (Family Medicine)  Jie Isabel MD PhD as MD (Family Medicine)  Lilia Aguilar [facility] as Pharmacist (Pharmacist)  Kaylen Bañuelos PA-C as Assigned Surgical Provider  Mikki Dumont APRN CNP as Nurse Practitioner (Cardiovascular Disease)  Mikki Dumont APRN CNP as Assigned Heart and Vascular Provider  Deb Killian NP as Assigned PCP  Ofe Suero PA-C as Physician Assistant (Gastroenterology)    History of Present Illness:   Deb Nelson is a 78 year old female with significant past medical history pertinent for hearing loss, meningioma, osteopenia who is presenting as a follow-up however as a new patient to myself with chief complaint of GERD/dysphonia/hoarseness/chronic throat clearing/cough.    Interval History July 14, 2023:  Previously patient was seen by Dr. Mac Soto (5/2021) with concerns of reflux, dyspepsia and heartburn.  At this time she reported that she had recurrence approximately 4 years prior for which she used omeprazole for approximately 1 month with resolution of symptoms.    Continues to have occasional reflux occurring at once or twice per month. Notes specifically waking at night with chest discomfort. This is relieved with TUMs.     Associated with throat clearing/coughing and hoarseness of the voice on going for the past 6 months.     Denies weight loss, nausea, emesis, dysphagia, odynophagia, abdominal pain, diarrhea, constipation (< 3 stools per week), nocturnal stooling, incontinence of feces, melena, hematochezia and BRBR.     Mother with history of colon cancer diagnosed at age in last 40s.     No family history or GI related malignancy (esophageal, gastric, pancreatic, liver or colon) or family history of IBD/celiac disease.     No  allergy to Nikel.     Worked in [employer] for many years. 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      Esophageal Questionnaire(s)    BEDQ Questionnaire       No data to display                   No data to display                Eckardt Questionnaire       No data to display                Promis 10 Questionnaire       No data to display                    STUDIES & PROCEDURES:    EGD:     6/17/2021   Impression:          Suggestion of healing ulcer or scar at 29cm from                        incisors- biopsied. Otherwise normal exam with no                        esophagitis or complications of reflux.                        - Scarred (post ulcer) mucosa in the esophagus. Biopsied.                        - Z-line regular, 40 cm from the incisors.                        - Gastroesophageal flap valve classified as Hill Grade I                        (prominent fold, tight to endoscope).                        - Normal stomach.                        - Normal duodenal bulb and second portion of the                        duodenum.                        - The examination was otherwise normal.    FINAL DIAGNOSIS:   A. Esophagus, biopsy:   Squamous esophageal mucosa with no intraepithelial eosinophils or other   abnormality       Colonoscopy:    6/17/2021                                                                        Findings:        The perianal and digital rectal examinations were normal.        A 1 mm polyp was found in the ascending colon. The polyp was sessile.        The polyp was removed with a jumbo cold forceps. Resection and retrieval        were complete.        Two small-mouthed diverticula were found in the ascending colon.        Retroflexion in the right colon was performed.        The terminal ileum appeared normal.        The exam was otherwise without abnormality on direct and retroflexion        views.     B. Right/Ascending Colon, 1 mm Polyp:   Colonic mucosa with  lymphoid aggregate; no neoplastic or hyperplastic   polyp identified       2015 - Colonoscopy without polyps    EndoFLIP directed at the UES or LES (8cm (EF-325) balloon length or 16cm (EF-322) balloon length):   Date:  8cm balloon  Balloon inflation Balloon pressure CSA (mm^2) DI (mm^2/mmHg) Dmin (mm) Compliance   20 (ladmark ID)        30        40        50           16cm balloon  Balloon inflation Balloon pressure CSA (mm^2) DI (mm^2/mmHg) Dmin (mm) Compliance   30 (ladmark ID)        40        50        60        70           High Resolution Manometry:    PH/Impedance:     Bravo:    CT:    Esophagram:    FL VSS:     GES:    U/S:     XRAY:    Other:       Prior medical records were reviewed including, but not limited to, notes from referring providers, lab work, radiographic tests, and other diagnostic tests. Pertinent results were summarized above.     History     Past Medical History:   Diagnosis Date     Arthritis      Cataract      Glaucoma      Meningioma (H)     Neurosurgeon in [facility]     Osseous obstruction of eustachian tube (aka CHOLESTEOTOMA)        Past Surgical History:   Procedure Laterality Date     APPENDECTOMY  1986     CATARACT IOL, RT/LT       COLONOSCOPY  2015     COLONOSCOPY N/A 6/17/2021    Procedure: COLONOSCOPY, WITH POLYPECTOMY AND BIOPSY;  Surgeon: Mac Soto MD;  Location:  GI     ENT SURGERY  1983    cholesteotoma     ESOPHAGOSCOPY, GASTROSCOPY, DUODENOSCOPY (EGD), COMBINED N/A 6/17/2021    Procedure: ESOPHAGOGASTRODUODENOSCOPY, WITH BIOPSY;  Surgeon: Mac Soto MD;  Location: [facility]     HC EXCISION OF CHOLESTEATOMA, MIDDLE EAR  1982       Social History     Socioeconomic History     Marital status:      Spouse name: Not on file     Number of children: Not on file     Years of education: Not on file     Highest education level: Not on file   Occupational History     Not on file   Tobacco Use     Smoking status: Former     Packs/day: 2.00     Years: 25.00      Pack years: 50.00     Types: Cigarettes     Start date: 1960     Quit date: 1985     Years since quittin.9     Smokeless tobacco: Never   Vaping Use     Vaping Use: Never used   Substance and Sexual Activity     Alcohol use: Yes     Alcohol/week: 1.7 standard drinks of alcohol     Comment: 7 per week     Drug use: No     Sexual activity: Yes     Partners: Female     Birth control/protection: Post-menopausal   Other Topics Concern     Parent/sibling w/ CABG, MI or angioplasty before 65F 55M? Not Asked   Social History Narrative    Moved to [place] from [place] to be near partner    Retired professor of Nursing at [employer]     Social Determinants of Health     Financial Resource Strain: Not on file   Food Insecurity: Not on file   Transportation Needs: Not on file   Physical Activity: Not on file   Stress: Not on file   Social Connections: Not on file   Intimate Partner Violence: Not on file   Housing Stability: Not on file       Family History   Problem Relation Age of Onset     Colon Cancer Mother      Breast Cancer Mother      Uterine Cancer Mother      Osteoporosis Mother      Dementia Mother      Melanoma Sister      Cerebrovascular Disease Sister 80     Osteoporosis Sister      Coronary Artery Disease Sister 80        EF 50%, s/p ICD     Chronic Obstructive Pulmonary Disease Sister         smoker     Thyroid Disease Sister         not sure which type     Coronary Artery Disease Sister 75        s/p PCI/Federico     Osteoporosis Sister      Cerebrovascular Disease Sister 80     Coronary Artery Disease Sister 80        s/p PCI/DESEAN     Dementia Brother 82     Coronary Artery Disease Brother 80        s/p MI, s/p PCI/DESEAN, s/p ICD     Hypertension Brother      Mental Illness Brother         Schizophrenia/PTSD post [place],  from [facility]     Psychotic Disorder Other         mother, depression; father schizophrenia     Skin Cancer No family hx of      Family history  reviewed and edited as appropriate    Medications and Allergies:     Outpatient Encounter Medications as of 7/14/2023   Medication Sig Dispense Refill     Acetaminophen (TYLENOL PO) Take 325 mg by mouth PRN       calcium carbonate 500 mg (elemental) (OSCAL 500) 1250 (500 Ca) MG TABS tablet Take by mouth daily       mvw complete formulation (CHEWABLES) tablet Take 1 tablet by mouth daily       ofloxacin (FLOXIN) 0.3 % otic solution PLACE 5 DROPS INTO RIGHT EAR DAILY. USE FOR 3 DAYS PRIOR TO NEXT EAR APPT. (Patient not taking: Reported on 7/10/2023)       zoledronic acid (ZOMETA) 4 MG/100ML SOLN Inject 4 mg into the vein once       No facility-administered encounter medications on file as of 7/14/2023.        Allergies   Allergen Reactions     Clindamycin Itching and Rash        Review of systems:  A full 10 point review of systems was obtained and was negative except for the pertinent positives and negatives stated within the HPI.    Objective Findings:   Physical Exam:    Constitutional: There were no vitals taken for this visit.  General: Alert, cooperative, no distress, well-appearing. Throat clearing/cough throughout visit  Head: Atraumatic, normocephalic, no obvious abnormalities   Eyes: Sclera anicteric, no obvious conjunctival hemorrhage   Nose: Nares normal, no obvious malformation, no obvious rhinorrhea   Respiratory: Resting comfortably, no apparent distress  Cardiovascular: RRR, No murmurs, rubs, or gallops  Gastrointestinal: Flat, non-distended  Skin: No jaundice, no obvious rash  Neurologic: AAOx3, no obvious neurologic abnormality  Psychiatric: Normal Affect, appropriate mood  Extremities: No obvious edema, no obvious malformation     Labs, Radiology, Pathology     Lab Results   Component Value Date    WBC 4.4 10/03/2022    WBC 4.2 03/17/2021    WBC 6.7 08/18/2014    HGB 13.2 10/03/2022    HGB 13.8 03/17/2021    HGB 13.0 08/18/2014     10/03/2022     03/17/2021     08/18/2014     CHOL 206 (H) 07/10/2023    CHOL 237 (H) 12/22/2022    CHOL 219 (H) 03/17/2021    TRIG 99 07/10/2023    TRIG 104 12/22/2022    TRIG 74 03/17/2021    HDL 93 07/10/2023     12/22/2022     03/17/2021    ALT 15 10/03/2022    ALT 25 03/17/2021    ALT 21 08/18/2014    AST 27 10/03/2022    AST 22 03/17/2021    AST 21 08/18/2014     07/10/2023     10/03/2022     05/09/2022    BUN 16.6 07/10/2023    BUN 15.1 10/03/2022    BUN 21 05/09/2022    CO2 24 07/10/2023    CO2 28 10/03/2022    CO2 25 05/09/2022    TSH 2.40 10/03/2022    TSH 2.44 05/10/2021        Liver Function Studies -   Recent Labs   Lab Test 10/03/22  1003   PROTTOTAL 7.3   ALBUMIN 4.6   BILITOTAL 0.4   ALKPHOS 41   AST 27   ALT 15          Patient Active Problem List    Diagnosis Date Noted     Senile osteoporosis 05/11/2021     Priority: Medium     Family history of malignant neoplasm of gastrointestinal tract 05/10/2021     Priority: Medium     Brow ptosis 12/17/2020     Priority: Medium     Nonexudative age-related macular degeneration, bilateral, early dry stage 12/17/2020     Priority: Medium     Pseudophakia, both eyes 12/17/2020     Priority: Medium     Formatting of this note might be different from the original.  Left eye 12/2015  Right eye 2019       Mixed conductive and sensorineural hearing loss of right ear with restricted hearing of left ear 12/22/2017     Priority: Medium     AK (actinic keratosis) 10/20/2017     Priority: Medium     Meningioma (H) 12/16/2016     Priority: Medium     Last Assessment & Plan:   Formatting of this note might be different from the original.  Three year follow-up for right suboccipital meningioma without symptoms. MRI looks similar to last study and there has not been much change since 2007.  Imp: asymptomatic meningioma  Plan: R/V with MRI scan in three years with MRI       H/O senile osteopenia 11/02/2016     Priority: Medium     Had Dexa many year ago, last Dexa 2014. Stated okay  already. Tool Fosamax for 5 years in the past.        Dyspepsia 11/02/2016     Priority: Medium     History of cataract 11/02/2016     Priority: Medium     Generalized osteoarthritis of multiple sites 11/02/2016     Priority: Medium     History of meningioma of the brain 11/02/2016     Priority: Medium     Recurrent cholesteatoma of postmastoidectomy cavity, unspecified laterality 11/02/2016     Priority: Medium     Fx wrist 07/01/2016     Priority: Medium     Degenerative, intervertebral disc, cervical 10/15/2012     Priority: Medium     Right shoulder pain 10/15/2012     Priority: Medium     Anatomical narrow angle borderline glaucoma 12/20/2010     Priority: Medium     Formatting of this note might be different from the original.  anatomical narrow angles OU (/2010 OUPI's done )       PVD (posterior vitreous detachment) 12/20/2010     Priority: Medium     Formatting of this note might be different from the original.  PVD (posterior vitreous detachment) OU       Family history of musculoskeletal disease 06/28/2004     Priority: Medium      Assessment and Plan   Assessment/Plan:    Deb Nelson is a 78 year old female with significant past medical history pertinent for hearing loss, meningioma, osteopenia who is presenting as a follow-up however as a new patient to myself with chief complaint of GERD/dysphonia/hoarseness/chronic throat clearing/cough.    #GERD w/ Extraesophageal Symptoms  Previously patient was seen by Dr. Mac Soto (5/2021) with concerns of reflux, dyspepsia and heartburn.  At this time she reported that she had recurrence approximately 4 years prior for which she used omeprazole for approximately 1 month with resolution of symptoms.    Today Deb presents with dysphonia, chronic throat clearing/cough and voice hoarseness onset approximately 6 months prior.  Additionally, she is experiencing symptoms of substernal chest discomfort, heartburn and regurgitation.  Symptoms of chest  discomfort are occurring 1-2 times monthly which she notes to awake her from sleeps. Currently managed with as needed use of TUMs/Gaviscon.     EGD 6/2021 notable for a healing ulcer within the esophagus at 29 cm from the incisors.  Biopsies with no histopathological abnormality.  No evidence of eosinophils or other inflammatory cells.    To objectively measure the amount of acid exposure patient is agreeable to undergoing EGD with BRAVO study off PPI therapy. Pending results adjustments in acid suppressive therapy will be made as patient wishes to proceed with lifestyle modifications prior to initiation of acid suppressive therapy. Differential for dysphonia/chronic cough and throat clearing includes reflux, irritable larynx syndrome, PND, functional heartburn, esophageal hypersensitivity vs other.     - Consider adding reflux gourmet or Gaviscon with alginate component nightly  - Reflux lifestyle modifications as directed within the AVS   - Upper endoscopy with BRAVO study off acid suppressive therapy   - Consultation with ENT Dr. Moon     #Colorectal Cancer Screening   Colonoscopy 6/17/2020 notable for millimeter ascending colonic polyp consistent with lymphoid aggregate.  Colonoscopy 2015 that was unremarkable. Family history pertinent mother diagnosed with CRC in 40s.         Follow up plan:   Return to clinic 3 months and as needed.    The risks and benefits of my recommendations, as well as other treatment options were discussed with the patient and any available family today. All questions were answered.     o Follow up: As planned above. Today, I personally spent 22 minutes in direct face to face time with the patient, of which greater than 50% of the time was spent in patient education and counseling as described above. Approximately 16 minutes were spent on indirect care associated with the patient's consultation including but not limited to review of: patient medical records to date, clinic visits,  hospital records, lab results, imaging studies, procedural documentation, and coordinating care with other providers. The findings from this review are summarized in the above note. All of the above accounted for a cumulative time of 38 minutes and was performed on the date of service.     The patient verbalized understanding of the plan and was appreciative for the time spent and information provided during the office visit.           Ofe Suero PA-C  Division of Gastroenterology, Hepatology, and Nutrition  [facility]       Documentation assisted by voice recognition and documentation system.

## 2023-07-14 ENCOUNTER — OFFICE VISIT (OUTPATIENT)
Dept: GASTROENTEROLOGY | Facility: CLINIC | Age: 78
End: 2023-07-14
Attending: PHYSICIAN ASSISTANT
Payer: MEDICARE

## 2023-07-14 ENCOUNTER — ANCILLARY PROCEDURE (OUTPATIENT)
Dept: ULTRASOUND IMAGING | Facility: CLINIC | Age: 78
End: 2023-07-14
Attending: NURSE PRACTITIONER
Payer: MEDICARE

## 2023-07-14 VITALS
DIASTOLIC BLOOD PRESSURE: 70 MMHG | SYSTOLIC BLOOD PRESSURE: 117 MMHG | BODY MASS INDEX: 24.48 KG/M2 | HEIGHT: 67 IN | OXYGEN SATURATION: 96 % | WEIGHT: 156 LBS | HEART RATE: 72 BPM

## 2023-07-14 DIAGNOSIS — K76.89 LIVER NODULE: ICD-10-CM

## 2023-07-14 DIAGNOSIS — R49.0 HOARSENESS: ICD-10-CM

## 2023-07-14 DIAGNOSIS — K21.00 GASTROESOPHAGEAL REFLUX DISEASE WITH ESOPHAGITIS WITHOUT HEMORRHAGE: ICD-10-CM

## 2023-07-14 DIAGNOSIS — R49.0 DYSPHONIA: ICD-10-CM

## 2023-07-14 DIAGNOSIS — R05.3 CHRONIC COUGH: Primary | ICD-10-CM

## 2023-07-14 PROCEDURE — 99214 OFFICE O/P EST MOD 30 MIN: CPT | Performed by: PHYSICIAN ASSISTANT

## 2023-07-14 PROCEDURE — 76705 ECHO EXAM OF ABDOMEN: CPT | Mod: GC | Performed by: RADIOLOGY

## 2023-07-14 ASSESSMENT — PAIN SCALES - GENERAL: PAINLEVEL: NO PAIN (0)

## 2023-07-14 NOTE — PATIENT INSTRUCTIONS
It was a pleasure taking care of you today.  I've included a brief summary of our discussion and care plan from today's visit below.  Please review this information with your primary care provider.  _______________________________________________________________________    My recommendations are summarized as follows:    - Consider adding reflux gourmet or Gaviscon with alginate component nightly   - Upper endoscopy with BRAVO study off acid suppressive therapy   - Consultation with ENT Dr. Moon     Gastroesophageal Reflux Disease (GERD) Lifestyle Modifications:   If taking acid suppression therapy (PPI ie Pantoprazole, Lansoprazole, Omeprazole, Esomeprazole, Rabeprazole, Dexlansoprazole) it should be taken 30 - 60 minutes prior to meals on an empty stomach to have maximum effect  Avoid triggers for reflux such as coffee, chocolate, carbonated beverages, spicy foods, acidic foods (tomato based/citrus and foods with high fat content   Abstinence from alcohol and cessation of all tobacco products is recommended   Studies have shown that weight loss, exercise and maintaining a healthy BMI significantly reduce GERD symptoms   Remain upright while eating and immediately after meals  Do not eat or drink at least 3 hours prior to laying down supine/laying down for bed   Avoid late night/middle of the night snacking    Consider obtaining a wedge pillow or elevating the head end of the bed while sleeping   Avoid sleeping right side down as this can place the lower part of the esophagus/lower esophageal sphincter in a dependent position that favors reflux   Attempting to eat smaller more frequent meals may improve symptoms       To schedule endoscopic procedures you may call: [phone]  To schedule radiology (imaging) tests you may call: [phone]  To schedule an ENT appointment you may call: [phone]    Please call my nurse Geri ([phone]), Rosalie ([phone]) with any questions or concerns.      Return to GI  Clinic in 3 months to review your progress.    _______________________________________________________________________    Who do I call with any questions after my visit?  Please be in touch if there are any further questions that arise following today's visit.  There are multiple ways to contact your gastroenterology care team.      During business hours, you may reach a Gastroenterology nurse at [phone] and choose option 3.       To schedule or reschedule an appointment, please call [phone].     You can always send a secure message through [email or web address].  [email or web address] messages are answered by your nurse or doctor typically within 24 hours.  Please allow extra time on weekends and holidays.      For urgent/emergent questions after business hours, you may reach the on-call GI Fellow by contacting the [facility]  at [phone].     How will I get the results of any tests ordered?    You will receive all of your results.  If you have signed up for [facility], any tests ordered at your visit will be available to you after your physician reviews them.  Typically this takes 1-2 weeks.  If there are urgent results that require a change in your care plan, your physician or nurse will call you to discuss the next steps.      What is [email or web address]?  [email or web address] is a secure way for you to access all of your healthcare records from the [facility].  It is a web based computer program, so you can sign on to it from any location.  It also allows you to send secure messages to your care team.  I recommend signing up for [email or web address] access if you have not already done so and are comfortable with using a computer.      How to I schedule a follow-up visit?  If you did not schedule a follow-up visit today, please call [phone] to schedule a follow-up office visit.      If you feel you received exceptional care and are interested in supporting the clinical and research goals of Ofe Suero PA-C or the Division of  Gastroenterology, Hepatology, and Nutrition please contact [email or web address] from the [facility] to discuss opportunities to donate.    Sincerely,    Ofe Suero PA-C  Division of Gastroenterology, Hepatology, and Nutrition  [facility]

## 2023-07-14 NOTE — LETTER
7/14/2023         RE: Deb Nelson  [address]  [facility] [address]        Dear Colleague,    Thank you for referring your patient, Deb Nelson, to the [facility] [place]. Please see a copy of my visit note below.    Gastroenterology Visit for: Deb Nelson 1945   MRN: [id number]     Reason for Visit:  chief complaint    Referred by: Self  / No address on file  Patient Care Team:  Deb Killian NP as PCP - General (Family Medicine)  Jie Isabel MD PhD as MD (Family Medicine)  Lilia Aguilar [facility] as Pharmacist (Pharmacist)  Kaylen Bañuelos PA-C as Assigned Surgical Provider  Mikki Dumont APRN CNP as Nurse Practitioner (Cardiovascular Disease)  Mikki Dumont APRN CNP as Assigned Heart and Vascular Provider  Deb Killian NP as Assigned PCP  Ofe Suero PA-C as Physician Assistant (Gastroenterology)    History of Present Illness:   Deb Nelson is a 78 year old female with significant past medical history pertinent for hearing loss, meningioma, osteopenia who is presenting as a follow-up however as a new patient to myself with chief complaint of GERD/dysphonia/hoarseness/chronic throat clearing/cough.    Interval History July 14, 2023:  Previously patient was seen by Dr. Mac Soto (5/2021) with concerns of reflux, dyspepsia and heartburn.  At this time she reported that she had recurrence approximately 4 years prior for which she used omeprazole for approximately 1 month with resolution of symptoms.    Continues to have occasional reflux occurring at once or twice per month. Notes specifically waking at night with chest discomfort. This is relieved with TUMs.     Associated with throat clearing/coughing and hoarseness of the voice on going for the past 6 months.     Denies weight loss, nausea, emesis, dysphagia, odynophagia, abdominal pain, diarrhea, constipation (< 3 stools per week), nocturnal  stooling, incontinence of feces, melena, hematochezia and BRBR.     Mother with history of colon cancer diagnosed at age in last 40s.     No family history or GI related malignancy (esophageal, gastric, pancreatic, liver or colon) or family history of IBD/celiac disease.     No allergy to Nikel.     Worked in [employer] for many years. 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      Esophageal Questionnaire(s)    BEDQ Questionnaire       No data to display                   No data to display                Eckardt Questionnaire       No data to display                Promis 10 Questionnaire       No data to display                    STUDIES & PROCEDURES:    EGD:     6/17/2021   Impression:          Suggestion of healing ulcer or scar at 29cm from                        incisors- biopsied. Otherwise normal exam with no                        esophagitis or complications of reflux.                        - Scarred (post ulcer) mucosa in the esophagus. Biopsied.                        - Z-line regular, 40 cm from the incisors.                        - Gastroesophageal flap valve classified as Hill Grade I                        (prominent fold, tight to endoscope).                        - Normal stomach.                        - Normal duodenal bulb and second portion of the                        duodenum.                        - The examination was otherwise normal.    FINAL DIAGNOSIS:   A. Esophagus, biopsy:   Squamous esophageal mucosa with no intraepithelial eosinophils or other   abnormality       Colonoscopy:    6/17/2021                                                                        Findings:        The perianal and digital rectal examinations were normal.        A 1 mm polyp was found in the ascending colon. The polyp was sessile.        The polyp was removed with a jumbo cold forceps. Resection and retrieval        were complete.        Two small-mouthed diverticula were found in  the ascending colon.        Retroflexion in the right colon was performed.        The terminal ileum appeared normal.        The exam was otherwise without abnormality on direct and retroflexion        views.     B. Right/Ascending Colon, 1 mm Polyp:   Colonic mucosa with lymphoid aggregate; no neoplastic or hyperplastic   polyp identified       2015 - Colonoscopy without polyps    EndoFLIP directed at the UES or LES (8cm (EF-325) balloon length or 16cm (EF-322) balloon length):   Date:  8cm balloon  Balloon inflation Balloon pressure CSA (mm^2) DI (mm^2/mmHg) Dmin (mm) Compliance   20 (ladmark ID)        30        40        50           16cm balloon  Balloon inflation Balloon pressure CSA (mm^2) DI (mm^2/mmHg) Dmin (mm) Compliance   30 (ladmark ID)        40        50        60        70           High Resolution Manometry:    PH/Impedance:     Bravo:    CT:    Esophagram:    FL VSS:     GES:    U/S:     XRAY:    Other:       Prior medical records were reviewed including, but not limited to, notes from referring providers, lab work, radiographic tests, and other diagnostic tests. Pertinent results were summarized above.     History     Past Medical History:   Diagnosis Date    Arthritis     Cataract     Glaucoma     Meningioma (H)     Neurosurgeon in [facility]    Osseous obstruction of eustachian tube (aka CHOLESTEOTOMA)        Past Surgical History:   Procedure Laterality Date    APPENDECTOMY  1986    CATARACT IOL, RT/LT      COLONOSCOPY  2015    COLONOSCOPY N/A 6/17/2021    Procedure: COLONOSCOPY, WITH POLYPECTOMY AND BIOPSY;  Surgeon: Mac Soto MD;  Location:  GI    ENT SURGERY  1983    cholesteotoma    ESOPHAGOSCOPY, GASTROSCOPY, DUODENOSCOPY (EGD), COMBINED N/A 6/17/2021    Procedure: ESOPHAGOGASTRODUODENOSCOPY, WITH BIOPSY;  Surgeon: Mac Soto MD;  Location:  GI    HC EXCISION OF CHOLESTEATOMA, MIDDLE EAR  1982       Social History     Socioeconomic History    Marital status:       Spouse name: Not on file    Number of children: Not on file    Years of education: Not on file    Highest education level: Not on file   Occupational History    Not on file   Tobacco Use    Smoking status: Former     Packs/day: 2.00     Years: 25.00     Pack years: 50.00     Types: Cigarettes     Start date: 1960     Quit date: 1985     Years since quittin.9    Smokeless tobacco: Never   Vaping Use    Vaping Use: Never used   Substance and Sexual Activity    Alcohol use: Yes     Alcohol/week: 1.7 standard drinks of alcohol     Comment: 7 per week    Drug use: No    Sexual activity: Yes     Partners: Female     Birth control/protection: Post-menopausal   Other Topics Concern    Parent/sibling w/ CABG, MI or angioplasty before 65F 55M? 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 [place]     Social Determinants of Health     Financial Resource Strain: Not on file   Food Insecurity: Not on file   Transportation Needs: Not on file   Physical Activity: Not on file   Stress: Not on file   Social Connections: Not on file   Intimate Partner Violence: Not on file   Housing Stability: Not on file       Family History   Problem Relation Age of Onset    Colon Cancer Mother     Breast Cancer Mother     Uterine Cancer Mother     Osteoporosis Mother     Dementia Mother     Melanoma Sister     Cerebrovascular Disease Sister 80    Osteoporosis Sister     Coronary Artery Disease Sister 80        EF 50%, s/p ICD    Chronic Obstructive Pulmonary Disease Sister         smoker    Thyroid Disease Sister         not sure which type    Coronary Artery Disease Sister 75        s/p PCI/Federico    Osteoporosis Sister     Cerebrovascular Disease Sister 80    Coronary Artery Disease Sister 80        s/p PCI/DESEAN    Dementia Brother 82    Coronary Artery Disease Brother 80        s/p MI, s/p PCI/DESEAN, s/p ICD    Hypertension Brother     Mental  Illness Brother         Schizophrenia/PTSD post [place],  from [facility]    Psychotic Disorder Other         mother, depression; father schizophrenia    Skin Cancer No family hx of      Family history reviewed and edited as appropriate    Medications and Allergies:     Outpatient Encounter Medications as of 2023   Medication Sig Dispense Refill    Acetaminophen (TYLENOL PO) Take 325 mg by mouth PRN      calcium carbonate 500 mg (elemental) (OSCAL 500) 1250 (500 Ca) MG TABS tablet Take by mouth daily      mvw complete formulation (CHEWABLES) tablet Take 1 tablet by mouth daily      ofloxacin (FLOXIN) 0.3 % otic solution PLACE 5 DROPS INTO RIGHT EAR DAILY. USE FOR 3 DAYS PRIOR TO NEXT EAR APPT. (Patient not taking: Reported on 7/10/2023)      zoledronic acid (ZOMETA) 4 MG/100ML SOLN Inject 4 mg into the vein once       No facility-administered encounter medications on file as of 2023.        Allergies   Allergen Reactions    Clindamycin Itching and Rash        Review of systems:  A full 10 point review of systems was obtained and was negative except for the pertinent positives and negatives stated within the HPI.    Objective Findings:   Physical Exam:    Constitutional: There were no vitals taken for this visit.  General: Alert, cooperative, no distress, well-appearing. Throat clearing/cough throughout visit  Head: Atraumatic, normocephalic, no obvious abnormalities   Eyes: Sclera anicteric, no obvious conjunctival hemorrhage   Nose: Nares normal, no obvious malformation, no obvious rhinorrhea   Respiratory: Resting comfortably, no apparent distress  Cardiovascular: RRR, No murmurs, rubs, or gallops  Gastrointestinal: Flat, non-distended  Skin: No jaundice, no obvious rash  Neurologic: AAOx3, no obvious neurologic abnormality  Psychiatric: Normal Affect, appropriate mood  Extremities: No obvious edema, no obvious malformation     Labs, Radiology, Pathology     Lab Results   Component  Value Date    WBC 4.4 10/03/2022    WBC 4.2 03/17/2021    WBC 6.7 08/18/2014    HGB 13.2 10/03/2022    HGB 13.8 03/17/2021    HGB 13.0 08/18/2014     10/03/2022     03/17/2021     08/18/2014    CHOL 206 (H) 07/10/2023    CHOL 237 (H) 12/22/2022    CHOL 219 (H) 03/17/2021    TRIG 99 07/10/2023    TRIG 104 12/22/2022    TRIG 74 03/17/2021    HDL 93 07/10/2023     12/22/2022     03/17/2021    ALT 15 10/03/2022    ALT 25 03/17/2021    ALT 21 08/18/2014    AST 27 10/03/2022    AST 22 03/17/2021    AST 21 08/18/2014     07/10/2023     10/03/2022     05/09/2022    BUN 16.6 07/10/2023    BUN 15.1 10/03/2022    BUN 21 05/09/2022    CO2 24 07/10/2023    CO2 28 10/03/2022    CO2 25 05/09/2022    TSH 2.40 10/03/2022    TSH 2.44 05/10/2021        Liver Function Studies -   Recent Labs   Lab Test 10/03/22  1003   PROTTOTAL 7.3   ALBUMIN 4.6   BILITOTAL 0.4   ALKPHOS 41   AST 27   ALT 15          Patient Active Problem List    Diagnosis Date Noted    Senile osteoporosis 05/11/2021     Priority: Medium    Family history of malignant neoplasm of gastrointestinal tract 05/10/2021     Priority: Medium    Brow ptosis 12/17/2020     Priority: Medium    Nonexudative age-related macular degeneration, bilateral, early dry stage 12/17/2020     Priority: Medium    Pseudophakia, both eyes 12/17/2020     Priority: Medium     Formatting of this note might be different from the original.  Left eye 12/2015  Right eye 2019      Mixed conductive and sensorineural hearing loss of right ear with restricted hearing of left ear 12/22/2017     Priority: Medium    AK (actinic keratosis) 10/20/2017     Priority: Medium    Meningioma (H) 12/16/2016     Priority: Medium     Last Assessment & Plan:   Formatting of this note might be different from the original.  Three year follow-up for right suboccipital meningioma without symptoms. MRI looks similar to last study and there has not been much change since  2007.  Imp: asymptomatic meningioma  Plan: R/V with MRI scan in three years with MRI      H/O senile osteopenia 11/02/2016     Priority: Medium     Had Dexa many year ago, last Dexa 2014. Stated okay already. Tool Fosamax for 5 years in the past.       Dyspepsia 11/02/2016     Priority: Medium    History of cataract 11/02/2016     Priority: Medium    Generalized osteoarthritis of multiple sites 11/02/2016     Priority: Medium    History of meningioma of the brain 11/02/2016     Priority: Medium    Recurrent cholesteatoma of postmastoidectomy cavity, unspecified laterality 11/02/2016     Priority: Medium    Fx wrist 07/01/2016     Priority: Medium    Degenerative, intervertebral disc, cervical 10/15/2012     Priority: Medium    Right shoulder pain 10/15/2012     Priority: Medium    Anatomical narrow angle borderline glaucoma 12/20/2010     Priority: Medium     Formatting of this note might be different from the original.  anatomical narrow angles OU (/2010 OUPI's done )      PVD (posterior vitreous detachment) 12/20/2010     Priority: Medium     Formatting of this note might be different from the original.  PVD (posterior vitreous detachment) OU      Family history of musculoskeletal disease 06/28/2004     Priority: Medium      Assessment and Plan   Assessment/Plan:    Deb Nelson is a 78 year old female with significant past medical history pertinent for hearing loss, meningioma, osteopenia who is presenting as a follow-up however as a new patient to myself with chief complaint of GERD/dysphonia/hoarseness/chronic throat clearing/cough.    #GERD w/ Extraesophageal Symptoms  Previously patient was seen by Dr. Mac Soto (5/2021) with concerns of reflux, dyspepsia and heartburn.  At this time she reported that she had recurrence approximately 4 years prior for which she used omeprazole for approximately 1 month with resolution of symptoms.    Today Deb presents with dysphonia, chronic throat clearing/cough  and voice hoarseness onset approximately 6 months prior.  Additionally, she is experiencing symptoms of substernal chest discomfort, heartburn and regurgitation.  Symptoms of chest discomfort are occurring 1-2 times monthly which she notes to awake her from sleeps. Currently managed with as needed use of TUMs/Gaviscon.     EGD 6/2021 notable for a healing ulcer within the esophagus at 29 cm from the incisors.  Biopsies with no histopathological abnormality.  No evidence of eosinophils or other inflammatory cells.    To objectively measure the amount of acid exposure patient is agreeable to undergoing EGD with BRAVO study off PPI therapy. Pending results adjustments in acid suppressive therapy will be made as patient wishes to proceed with lifestyle modifications prior to initiation of acid suppressive therapy. Differential for dysphonia/chronic cough and throat clearing includes reflux, irritable larynx syndrome, PND, functional heartburn, esophageal hypersensitivity vs other.     - Consider adding reflux gourmet or Gaviscon with alginate component nightly  - Reflux lifestyle modifications as directed within the AVS   - Upper endoscopy with BRAVO study off acid suppressive therapy   - Consultation with ENT Dr. Moon     #Colorectal Cancer Screening   Colonoscopy 6/17/2020 notable for millimeter ascending colonic polyp consistent with lymphoid aggregate.  Colonoscopy 2015 that was unremarkable. Family history pertinent mother diagnosed with CRC in 40s.     Follow up plan:   Return to clinic 3 months and as needed.    The risks and benefits of my recommendations, as well as other treatment options were discussed with the patient and any available family today. All questions were answered.     Follow up: As planned above. Today, I personally spent 22 minutes in direct face to face time with the patient, of which greater than 50% of the time was spent in patient education and counseling as described above. Approximately  16 minutes were spent on indirect care associated with the patient's consultation including but not limited to review of: patient medical records to date, clinic visits, hospital records, lab results, imaging studies, procedural documentation, and coordinating care with other providers. The findings from this review are summarized in the above note. All of the above accounted for a cumulative time of 38 minutes and was performed on the date of service.     The patient verbalized understanding of the plan and was appreciative for the time spent and information provided during the office visit.     Documentation assisted by voice recognition and documentation system.      Again, thank you for allowing me to participate in the care of your patient.      Sincerely,    Ofe Suero PA-C

## 2023-07-14 NOTE — NURSING NOTE
"Chief Complaint   Patient presents with     Follow Up       Vitals:    07/14/23 0718   BP: 117/70   Pulse: 72   SpO2: 96%   Weight: 70.8 kg (156 lb)   Height: 1.689 m (5' 6.5\")       Body mass index is 24.8 kg/m .    Sherita Monterroso MA    "

## 2023-07-26 ENCOUNTER — TELEPHONE (OUTPATIENT)
Dept: GASTROENTEROLOGY | Facility: CLINIC | Age: 78
End: 2023-07-26
Payer: MEDICARE

## 2023-07-26 NOTE — TELEPHONE ENCOUNTER
/Procedure Scheduled: Upper Endoscopy with BRAVO [EGD BRAVO]  Procedure Date: NEEDS SCHEDULING - CHECK WITH THISA  Site:[facility] [place]; [address] [place] [address], [address] Floor, [place] [address]  Endoscopist: SKIP   Ordering Provider: CASH VARGAS   Scheduled by (per the direction of):       **   **LVMTCB - AWAITING PT CALL BACK TO COMPLETE SCHEDULING -- SCREENING QUESTIONS NEEDED.     [place]       REMINDER: We do not accept [employer] insurance.

## 2023-08-01 NOTE — PROGRESS NOTES
[facility]  [address] [address]  Phone: [phone]  Fax: [phone]  Primary Provider: Ezekiel Julio  Pre-op Performing Provider: VIJAY JACKSON  [id number]}  PREOPERATIVE EVALUATION:  Today's date: 7/1/2021    Vijay Nelson is a 75 year old female who presents for a preoperative evaluation.    Surgical Information:  Surgery/Procedure: Re-do blepharoplasty L eye   Surgery Location: [facility]  Surgeon: Oj  Surgery Date: 07/02/21  Time of Surgery: 0730am  Where patient plans to recover: At home alone with family  Fax number for surgical facility: [phone]    Type of Anesthesia Anticipated: Conscious sedation    Subjective     HPI related to upcoming procedure: Vijay had a blepharoplasty in March, 2021.  The left eye upper eyelid continues to lag and she is having a re-do for that eye.  She had the right eye done and that is doing well.    Preop Questions 6/30/2021   1. Have you ever had a heart attack or stroke? No   2. Have you ever had surgery on your heart or blood vessels, such as a stent placement, a coronary artery bypass, or surgery on an artery in your head, neck, heart, or legs? No   3. Do you have chest pain with activity? No   4. Do you have a history of  heart failure? No   5. Do you currently have a cold, bronchitis or symptoms of other infection? No   6. Do you have a cough, shortness of breath, or wheezing? No   7. Do you or anyone in your family have previous history of blood clots? No   8. Do you or does anyone in your family have a serious bleeding problem such as prolonged bleeding following surgeries or cuts? No   9. Have you ever had problems with anemia or been told to take iron pills? No   10. Have you had any abnormal blood loss such as black, tarry or bloody stools, or abnormal vaginal bleeding? No   11. Have you ever had a blood transfusion? No   12. Are you willing to have a blood transfusion if it is medically needed before,  during, or after your surgery? Yes   13. Have you or any of your relatives ever had problems with anesthesia? No   14. Do you have sleep apnea, excessive snoring or daytime drowsiness? No   15. Do you have any artifical heart valves or other implanted medical devices like a pacemaker, defibrillator, or continuous glucose monitor? No   16. Do you have artificial joints? No   17. Are you allergic to latex? No       Health Care Directive:  Patient does not have a Health Care Directive or Living Will: Patient states has Advance Directive and will bring in a copy to clinic.    Review of Systems  Constitutional, neuro, ENT, endocrine, pulmonary, cardiac, gastrointestinal, genitourinary, musculoskeletal, integument and psychiatric systems are negative, except as otherwise noted.    Patient Active Problem List    Diagnosis Date Noted     Senile osteoporosis 05/11/2021     Priority: Medium     Family history of malignant neoplasm of gastrointestinal tract 05/10/2021     Priority: Medium     Brow ptosis 12/17/2020     Priority: Medium     Nonexudative age-related macular degeneration, bilateral, early dry stage 12/17/2020     Priority: Medium     Pseudophakia, both eyes 12/17/2020     Priority: Medium     Formatting of this note might be different from the original.  Left eye 12/2015  Right eye 2019       Mixed conductive and sensorineural hearing loss of right ear with restricted hearing of left ear 12/22/2017     Priority: Medium     AK (actinic keratosis) 10/20/2017     Priority: Medium     Meningioma (H) 12/16/2016     Priority: Medium     Last Assessment & Plan:   Formatting of this note might be different from the original.  Three year follow-up for right suboccipital meningioma without symptoms. MRI looks similar to last study and there has not been much change since 2007.  Imp: asymptomatic meningioma  Plan: R/V with MRI scan in three years with MRI       H/O senile osteopenia 11/02/2016     Priority: Medium     Had  Dexa many year ago, last Dexa 2014. Stated okay already. Tool Fosamax for 5 years in the past.        Dyspepsia 11/02/2016     Priority: Medium     History of cataract 11/02/2016     Priority: Medium     Generalized osteoarthritis of multiple sites 11/02/2016     Priority: Medium     History of meningioma of the brain 11/02/2016     Priority: Medium     Recurrent cholesteatoma of postmastoidectomy cavity, unspecified laterality 11/02/2016     Priority: Medium     Fx wrist 07/01/2016     Priority: Medium     Degenerative, intervertebral disc, cervical 10/15/2012     Priority: Medium     Right shoulder pain 10/15/2012     Priority: Medium     Anatomical narrow angle borderline glaucoma 12/20/2010     Priority: Medium     Formatting of this note might be different from the original.  anatomical narrow angles OU (/2010 OUPI's done )       PVD (posterior vitreous detachment) 12/20/2010     Priority: Medium     Formatting of this note might be different from the original.  PVD (posterior vitreous detachment) OU       Family history of musculoskeletal disease 06/28/2004     Priority: Medium      Past Medical History:   Diagnosis Date     Arthritis      Cataract      Glaucoma      Meningioma (H)     Neurosurgeon in [facility]     Osseous obstruction of eustachian tube (aka CHOLESTEOTOMA)      Past Surgical History:   Procedure Laterality Date     APPENDECTOMY  1986     CATARACT IOL, RT/LT       COLONOSCOPY  2015     COLONOSCOPY N/A 6/17/2021    Procedure: COLONOSCOPY, WITH POLYPECTOMY AND BIOPSY;  Surgeon: Mac Soto MD;  Location:  GI     ENT SURGERY  1983    cholesteotoma     ESOPHAGOSCOPY, GASTROSCOPY, DUODENOSCOPY (EGD), COMBINED N/A 6/17/2021    Procedure: ESOPHAGOGASTRODUODENOSCOPY, WITH BIOPSY;  Surgeon: Mac Soto MD;  Location: [facility]     HC EXCISION OF CHOLESTEATOMA, MIDDLE EAR  1982     Current Outpatient Medications   Medication Sig Dispense Refill     Acetaminophen (TYLENOL PO) Take 325 mg by  27.8 mouth PRN       zoledronic Acid (RECLAST) 5 MG/100ML SOLN infusion Takes once a year         Allergies   Allergen Reactions     Clindamycin Itching and Rash        Social History     Tobacco Use     Smoking status: Former Smoker     Packs/day: 2.00     Years: 25.00     Pack years: 50.00     Types: Cigarettes     Start date: 1960     Quit date: 1985     Years since quittin.9     Smokeless tobacco: Never Used   Substance Use Topics     Alcohol use: Yes     Alcohol/week: 1.7 standard drinks     Comment: 7 per week     Family History   Problem Relation Age of Onset     Colon Cancer Mother      Breast Cancer Mother      Cancer Mother      Uterine Cancer Mother      Osteoporosis Mother      Psychotic Disorder Other         mother, depression; father schizophrenia     Dementia Brother 82     Coronary Artery Disease Brother      Melanoma Sister      Cerebrovascular Disease Sister      Osteoporosis Sister      Coronary Artery Disease Sister      Osteoporosis Sister      Cerebrovascular Disease Sister      Mental Illness Brother         Schizophrenia/PTSD post [place]     Skin Cancer No family hx of      History   Drug Use No         Objective     There were no vitals taken for this visit.    Physical Exam    GENERAL APPEARANCE: healthy, alert and no distress     EYES: EOMI, PERRL     HENT: ear canals and TM's normal and nose and mouth without ulcers or lesions     NECK: no adenopathy, no asymmetry, masses, or scars and thyroid normal to palpation     RESP: lungs clear to auscultation - no rales, rhonchi or wheezes     CV: regular rates and rhythm, normal S1 S2, no S3 or S4 and no murmur, click or rub     ABDOMEN:  soft, nontender, no HSM or masses and bowel sounds normal     MS: extremities normal- no gross deformities noted, no evidence of inflammation in joints, FROM in all extremities.     SKIN: no suspicious lesions or rashes     NEURO: Normal strength and tone, sensory exam grossly normal, mentation intact  and speech normal     PSYCH: mentation appears normal. and affect normal/bright     LYMPHATICS: No cervical adenopathy    Recent Labs   Lab Test 05/10/21  0921 03/17/21  0840   HGB  --  13.8   PLT  --  218    137   POTASSIUM 4.6 4.7   CR 0.82 0.79   A1C  --  5.3       Diagnostics:  No labs were ordered during this visit.   No EKG required for low risk surgery (cataract, skin procedure, breast biopsy, etc).   EKG:  March of 2021.      1. Pre-op exam      Revised Cardiac Risk Index (RCRI):  The patient has the following serious cardiovascular risks for perioperative complications:   - No serious cardiac risks = 0 points     RCRI Interpretation: 0 points: Class I (very low risk - 0.4% complication rate)           Signed Electronically by: Deb Killian NP  Copy of this evaluation report is provided to requesting physician.

## 2023-08-08 ENCOUNTER — TELEPHONE (OUTPATIENT)
Dept: GASTROENTEROLOGY | Facility: CLINIC | Age: 78
End: 2023-08-08
Payer: MEDICARE

## 2023-08-08 NOTE — TELEPHONE ENCOUNTER
"Endoscopy Scheduling Screen    Have you had a positive Covid test in the last 14 days?  No    Are you active on MyChart?   Yes    What insurance is in the chart?  Other:  Medicare    Ordering/Referring Provider:   DEAN VARGAS        (If ordering provider performs procedure, schedule with ordering provider unless otherwise instructed. )    BMI: Estimated body mass index is 24.8 kg/m  as calculated from the following:    Height as of 7/14/23: 1.689 m (5' 6.5\").    Weight as of 7/14/23: 70.8 kg (156 lb).     Sedation Ordered  MAC/deep sedation.   BMI<= 45 45 < BMI <= 48 48 < BMI < = 50  BMI > 50   No Restrictions No [facility]  No [facility]  [facility] with exceptions Hospital Only OR Only       Do you have a history of malignant hyperthermia or adverse reaction to anesthesia?  No    (Females) Are you currently pregnant?   No     Have you been diagnosed or told you have pulmonary hypertension?   No    Do you have an LVAD?  No    Have you been told you have moderate to severe sleep apnea?  No    Have you been told you have COPD, asthma, or any other lung disease?  No    Do you have any heart conditions?  No     Have you ever had or are you awaiting a heart or lung transplant?   No    Have you had a stroke or transient ischemic attack (TIA aka \"mini stroke\" in the last 6 months?   No    Have you been diagnosed with or been told you have cirrhosis of the liver?   No    Are you currently on dialysis?   No    Do you need assistance transferring?   No    BMI: Estimated body mass index is 24.8 kg/m  as calculated from the following:    Height as of 7/14/23: 1.689 m (5' 6.5\").    Weight as of 7/14/23: 70.8 kg (156 lb).     Is patients BMI > 40 and scheduling location [facility]?  No    Do you take the medication Phentermine, Ozempic or Wegovy?  No    Do you take the medication Naltrexone?  No    Do you take blood thinners?  No      Prep   Are you currently on dialysis or do you have chronic kidney disease?      Do you have a diagnosis " of diabetes?      Do you have a diagnosis of cystic fibrosis (CF)?      On a regular basis do you go 3 -5 days between bowel movements?    BMI > 40?      Preferred Pharmacy:    [facility]/pharmacy #[id number] - [place], [place] - [address]  [address] [address]  Phone: [phone] Fax: [phone]    [facility]/pharmacy #[id number] - [place], [place] - [address]  [address] [address]  Phone: [phone] Fax: [phone]      Final Scheduling Details   Colonoscopy prep sent?      Procedure scheduled  Upper endoscopy with BRAVO capsule placement    Surgeon:  Neo     Date of procedure:  10/96     Schedule PAC:   No    Location  [facility]    Sedation   MAC/Deep Sedation    Patient Reminders:   You will receive a call from a Nurse to review instructions and health history.  This assessment must be completed prior to your procedure.  Failure to complete the Nurse assessment may result in the procedure being cancelled.      On the day of your procedure, please designate an adult(s) who can drive you home stay with you for the next 24 hours. The medicines used in the exam will make you sleepy. You will not be able to drive.      You cannot take public transportation, ride share services, or non-medical taxi service without a responsible caregiver.  Medical transport services are allowed with the requirement that a responsible caregiver will receive you at your destination.  We require that drivers and caregivers are confirmed prior to your procedure.

## 2023-08-17 ENCOUNTER — INFUSION THERAPY VISIT (OUTPATIENT)
Dept: INFUSION THERAPY | Facility: CLINIC | Age: 78
End: 2023-08-17
Attending: FAMILY MEDICINE
Payer: MEDICARE

## 2023-08-17 VITALS
HEART RATE: 66 BPM | TEMPERATURE: 97.8 F | SYSTOLIC BLOOD PRESSURE: 148 MMHG | BODY MASS INDEX: 24.84 KG/M2 | DIASTOLIC BLOOD PRESSURE: 81 MMHG | HEIGHT: 67 IN | OXYGEN SATURATION: 97 % | RESPIRATION RATE: 16 BRPM | WEIGHT: 158.3 LBS

## 2023-08-17 DIAGNOSIS — M81.0 SENILE OSTEOPOROSIS: Primary | ICD-10-CM

## 2023-08-17 PROCEDURE — 250N000011 HC RX IP 250 OP 636: Mod: JZ | Performed by: FAMILY MEDICINE

## 2023-08-17 PROCEDURE — 96365 THER/PROPH/DIAG IV INF INIT: CPT

## 2023-08-17 RX ORDER — ZOLEDRONIC ACID 5 MG/100ML
5 INJECTION, SOLUTION INTRAVENOUS ONCE
Status: CANCELLED
Start: 2023-08-17

## 2023-08-17 RX ORDER — ALBUTEROL SULFATE 0.83 MG/ML
2.5 SOLUTION RESPIRATORY (INHALATION)
Status: CANCELLED | OUTPATIENT
Start: 2023-08-17

## 2023-08-17 RX ORDER — HEPARIN SODIUM,PORCINE 10 UNIT/ML
5-20 VIAL (ML) INTRAVENOUS DAILY PRN
Status: CANCELLED | OUTPATIENT
Start: 2023-08-17

## 2023-08-17 RX ORDER — ALBUTEROL SULFATE 90 UG/1
1-2 AEROSOL, METERED RESPIRATORY (INHALATION)
Status: CANCELLED
Start: 2023-08-17

## 2023-08-17 RX ORDER — ZOLEDRONIC ACID 5 MG/100ML
5 INJECTION, SOLUTION INTRAVENOUS ONCE
Status: COMPLETED | OUTPATIENT
Start: 2023-08-17 | End: 2023-08-17

## 2023-08-17 RX ORDER — DIPHENHYDRAMINE HYDROCHLORIDE 50 MG/ML
50 INJECTION INTRAMUSCULAR; INTRAVENOUS
Status: CANCELLED
Start: 2023-08-17

## 2023-08-17 RX ORDER — HEPARIN SODIUM (PORCINE) LOCK FLUSH IV SOLN 100 UNIT/ML 100 UNIT/ML
5 SOLUTION INTRAVENOUS
Status: CANCELLED | OUTPATIENT
Start: 2023-08-17

## 2023-08-17 RX ORDER — METHYLPREDNISOLONE SODIUM SUCCINATE 125 MG/2ML
125 INJECTION, POWDER, LYOPHILIZED, FOR SOLUTION INTRAMUSCULAR; INTRAVENOUS
Status: CANCELLED
Start: 2023-08-17

## 2023-08-17 RX ORDER — EPINEPHRINE 1 MG/ML
0.3 INJECTION, SOLUTION INTRAMUSCULAR; SUBCUTANEOUS EVERY 5 MIN PRN
Status: CANCELLED | OUTPATIENT
Start: 2023-08-17

## 2023-08-17 RX ADMIN — ZOLEDRONIC ACID 5 MG: 0.05 INJECTION, SOLUTION INTRAVENOUS at 09:00

## 2023-08-17 ASSESSMENT — PAIN SCALES - GENERAL: PAINLEVEL: NO PAIN (0)

## 2023-08-17 NOTE — PATIENT INSTRUCTIONS
Dear Deb Nelson    Thank you for choosing [facility] ([facility]) for your infusion.  The following information is a summary of our appointment as well as important reminders.        If you are a transplant patient and require transplant education, please click on this link: [email or web address].    We look forward in seeing you on your next appointment here at [facility] ([facility]).  Please don t hesitate to call us at [phone] to reschedule any of your appointments or to speak with one of the [facility] registered nurses.  It was a pleasure taking care of you today.    Sincerely,    [facility]  [facility]  [address] [place] [address]  [address]  Phone:  [phone]

## 2023-08-17 NOTE — PROGRESS NOTES
Infusion Nursing Note:  Deb Nelson presents today for Reclast.    Patient seen by provider today: No   present during visit today: Not Applicable.    Note: Pt given Reclast over 30 minutes without incident.      Intravenous Access:  Peripheral IV placed.  No labs due.    Treatment Conditions:  Lab Results   Component Value Date     07/10/2023    POTASSIUM 4.4 07/10/2023    MAG 2.3 05/09/2022    CR 0.79 07/10/2023    LOLA 9.6 07/10/2023    BILITOTAL 0.4 10/03/2022    ALBUMIN 4.6 10/03/2022    ALT 15 10/03/2022    AST 27 10/03/2022       Results reviewed, labs MET treatment parameters, ok to proceed with treatment.  Done on 07/10.  Pt confirms taking oral Calcium as ordered.      Post Infusion Assessment:  Patient tolerated infusion without incident.  Blood return noted pre and post infusion.  Site patent and intact, free from redness, edema or discomfort.  No evidence of extravasations.  Access discontinued per protocol.       Discharge Plan:   Discharge instructions reviewed with: Patient.  Patient and/or family verbalized understanding of discharge instructions and all questions answered.  AVS to patient via [employer].  Patient will return per MD for next appointment.   Patient discharged in stable condition accompanied by: self.  Departure Mode: Ambulatory.    Administrations This Visit       zoledronic Acid (RECLAST) infusion 5 mg       Admin Date  08/17/2023 Action  $New Bag Dose  5 mg Rate  200 mL/hr Route  Intravenous Administered By  Blanquita Abbott RN                        Blanquita Abbott RN

## 2023-09-20 ENCOUNTER — TELEPHONE (OUTPATIENT)
Dept: GASTROENTEROLOGY | Facility: CLINIC | Age: 78
End: 2023-09-20
Payer: MEDICARE

## 2023-09-22 ENCOUNTER — TELEPHONE (OUTPATIENT)
Dept: GASTROENTEROLOGY | Facility: CLINIC | Age: 78
End: 2023-09-22
Payer: MEDICARE

## 2023-09-22 NOTE — TELEPHONE ENCOUNTER
Pre visit planning completed.      Procedure details:    Patient scheduled for Upper endoscopy (EGD) with BRAVO capsule placement on 10/9/23.     Arrival time: 1015. Procedure time 1115    Pre op exam needed? N/A    Facility location: [facility]; [address] [place] [address], [address] Floor, [place] [address]    Sedation type: MAC    Indication for procedure: GERD, chronic cough, dysphonia, hoarseness      Chart review:     Electronic implanted devices? No    Diabetic? No      Medication review:    Anticoagulants? No    NSAIDS? No NSAID medications per patient's medication list.  RN will verify with pre-assessment call.    Other medication HOLDING recommendations:  N/A      Prep for procedure:     Prep instructions sent via [employer] - will send message after call out is made      Anju Wright RN  Endoscopy Procedure Pre Assessment RN  [phone] option 4

## 2023-09-25 NOTE — TELEPHONE ENCOUNTER
Pre assessment completed for upcoming procedure.      Procedure details:    Patient scheduled for Upper endoscopy (EGD) with BRAVO capsule placement on 10/9/23.     Arrival time: 1015. Procedure time 1115    Pre op exam needed? N/A    [facility]; [address] [place] [address], [address] Floor, [place] [address]    Sedation type: MAC    Indication for procedure: GERD, chronic cough, dysphonia, hoarseness    COVID policy reviewed.    Designated  policy reviewed. Instructed to have someone stay 24 hours post procedure.       Chart review:     Electronic implanted devices? No    Diabetic? No    Medication review:    Anticoagulants? No    NSAIDS? No    Other medication HOLDING recommendations:  Patient is not currently taking any acid reducing medications. RN did advise that 7 days prior to this procedure she should not take any acid reducing medications.       Prep for procedure:     Prep instructions sent via [employer]     Reviewed procedure prep instructions.     Patient verbalized understanding and had no questions or concerns at this time.        Anju Wright RN  Endoscopy Procedure Pre Assessment RN  [phone] option 4

## 2023-09-28 ENCOUNTER — TELEPHONE (OUTPATIENT)
Dept: GASTROENTEROLOGY | Facility: CLINIC | Age: 78
End: 2023-09-28
Payer: MEDICARE

## 2023-09-28 NOTE — TELEPHONE ENCOUNTER
FUTURE VISIT INFORMATION:      FUTURE VISIT INFORMATION:  Date: 12/12/23  Time: 8 AM  [place] [facility]  REFERRAL INFORMATION:  Referring provider:   Referring providers clinic:   Reason for visit/diagnosis:  Chronic cough [R05.3]  Dysphonia [R49.0]  Hoarseness [R49.0]  Gastroesophageal reflux disease with esophagitis without hemorrhage [K21.00]     RECORDS REQUESTED FROM:       Clinic name Comments Records Status Imaging Status   [facility] [place] 7/14/23 OV note from Ofe Suero PA-C   5/4/21 OV note from Mac Soto MD  Epic    Procedure 10/9/23 Upper GI  6/17/21 EGD [facility] MR brain 11/1/22 Epic PACS

## 2023-09-28 NOTE — TELEPHONE ENCOUNTER
Contacted pt to schedule appointment per IB received. Pt stated that her current date is fine with her.

## 2023-10-04 ENCOUNTER — PATIENT OUTREACH (OUTPATIENT)
Dept: GASTROENTEROLOGY | Facility: CLINIC | Age: 78
End: 2023-10-04
Payer: MEDICARE

## 2023-10-04 NOTE — TELEPHONE ENCOUNTER
Spoke with pt regarding upcoming Bravo study, using the monitor and Monitor return visit.  Went over how to use the monitor and symptoms in detail.  Monitor return visit scheduled for 10/13 @ 1pm  Pt symptom buttons will be:  [place] - Cough  [place] - Regurgitation  [place] - Heartburn

## 2023-10-09 ENCOUNTER — ANESTHESIA (OUTPATIENT)
Dept: SURGERY | Facility: AMBULATORY SURGERY CENTER | Age: 78
End: 2023-10-09
Payer: MEDICARE

## 2023-10-09 ENCOUNTER — ANESTHESIA EVENT (OUTPATIENT)
Dept: SURGERY | Facility: AMBULATORY SURGERY CENTER | Age: 78
End: 2023-10-09
Payer: MEDICARE

## 2023-10-09 ENCOUNTER — HOSPITAL ENCOUNTER (OUTPATIENT)
Facility: AMBULATORY SURGERY CENTER | Age: 78
Discharge: HOME | End: 2023-10-09
Attending: INTERNAL MEDICINE
Payer: MEDICARE

## 2023-10-09 VITALS
WEIGHT: 158 LBS | RESPIRATION RATE: 16 BRPM | DIASTOLIC BLOOD PRESSURE: 78 MMHG | OXYGEN SATURATION: 99 % | TEMPERATURE: 97.2 F | BODY MASS INDEX: 24.8 KG/M2 | HEIGHT: 67 IN | HEART RATE: 53 BPM | SYSTOLIC BLOOD PRESSURE: 154 MMHG

## 2023-10-09 VITALS — HEART RATE: 66 BPM

## 2023-10-09 LAB — UPPER GI ENDOSCOPY: NORMAL

## 2023-10-09 PROCEDURE — 88342 IMHCHEM/IMCYTCHM 1ST ANTB: CPT | Mod: 26 | Performed by: PATHOLOGY

## 2023-10-09 PROCEDURE — 91035 G-ESOPH REFLX TST W/ELECTROD: CPT | Mod: TC | Performed by: INTERNAL MEDICINE

## 2023-10-09 PROCEDURE — 88305 TISSUE EXAM BY PATHOLOGIST: CPT | Mod: 26 | Performed by: PATHOLOGY

## 2023-10-09 PROCEDURE — 88342 IMHCHEM/IMCYTCHM 1ST ANTB: CPT | Mod: TC | Performed by: INTERNAL MEDICINE

## 2023-10-09 PROCEDURE — 43239 EGD BIOPSY SINGLE/MULTIPLE: CPT | Performed by: INTERNAL MEDICINE

## 2023-10-09 RX ORDER — LIDOCAINE 40 MG/G
CREAM TOPICAL
Status: DISCONTINUED | OUTPATIENT
Start: 2023-10-09 | End: 2023-10-09 | Stop reason: HOSPADM

## 2023-10-09 RX ORDER — SODIUM CHLORIDE, SODIUM LACTATE, POTASSIUM CHLORIDE, CALCIUM CHLORIDE 600; 310; 30; 20 MG/100ML; MG/100ML; MG/100ML; MG/100ML
INJECTION, SOLUTION INTRAVENOUS CONTINUOUS PRN
Status: DISCONTINUED | OUTPATIENT
Start: 2023-10-09 | End: 2023-10-09

## 2023-10-09 RX ORDER — ONDANSETRON 2 MG/ML
4 INJECTION INTRAMUSCULAR; INTRAVENOUS
Status: DISCONTINUED | OUTPATIENT
Start: 2023-10-09 | End: 2023-10-09 | Stop reason: HOSPADM

## 2023-10-09 RX ORDER — NALOXONE HYDROCHLORIDE 0.4 MG/ML
0.2 INJECTION, SOLUTION INTRAMUSCULAR; INTRAVENOUS; SUBCUTANEOUS
Status: DISCONTINUED | OUTPATIENT
Start: 2023-10-09 | End: 2023-10-10 | Stop reason: HOSPADM

## 2023-10-09 RX ORDER — FLUMAZENIL 0.1 MG/ML
0.2 INJECTION, SOLUTION INTRAVENOUS
Status: DISCONTINUED | OUTPATIENT
Start: 2023-10-09 | End: 2023-10-10 | Stop reason: HOSPADM

## 2023-10-09 RX ORDER — PROCHLORPERAZINE MALEATE 5 MG
5 TABLET ORAL EVERY 6 HOURS PRN
Status: DISCONTINUED | OUTPATIENT
Start: 2023-10-09 | End: 2023-10-10 | Stop reason: HOSPADM

## 2023-10-09 RX ORDER — PROPOFOL 10 MG/ML
INJECTION, EMULSION INTRAVENOUS PRN
Status: DISCONTINUED | OUTPATIENT
Start: 2023-10-09 | End: 2023-10-09

## 2023-10-09 RX ORDER — ONDANSETRON 2 MG/ML
4 INJECTION INTRAMUSCULAR; INTRAVENOUS EVERY 6 HOURS PRN
Status: DISCONTINUED | OUTPATIENT
Start: 2023-10-09 | End: 2023-10-10 | Stop reason: HOSPADM

## 2023-10-09 RX ORDER — PROPOFOL 10 MG/ML
INJECTION, EMULSION INTRAVENOUS CONTINUOUS PRN
Status: DISCONTINUED | OUTPATIENT
Start: 2023-10-09 | End: 2023-10-09

## 2023-10-09 RX ORDER — ONDANSETRON 4 MG/1
4 TABLET, ORALLY DISINTEGRATING ORAL EVERY 6 HOURS PRN
Status: DISCONTINUED | OUTPATIENT
Start: 2023-10-09 | End: 2023-10-10 | Stop reason: HOSPADM

## 2023-10-09 RX ORDER — NALOXONE HYDROCHLORIDE 0.4 MG/ML
0.4 INJECTION, SOLUTION INTRAMUSCULAR; INTRAVENOUS; SUBCUTANEOUS
Status: DISCONTINUED | OUTPATIENT
Start: 2023-10-09 | End: 2023-10-10 | Stop reason: HOSPADM

## 2023-10-09 RX ORDER — LIDOCAINE HYDROCHLORIDE 20 MG/ML
INJECTION, SOLUTION INFILTRATION; PERINEURAL PRN
Status: DISCONTINUED | OUTPATIENT
Start: 2023-10-09 | End: 2023-10-09

## 2023-10-09 RX ADMIN — LIDOCAINE HYDROCHLORIDE 80 MG: 20 INJECTION, SOLUTION INFILTRATION; PERINEURAL at 11:42

## 2023-10-09 RX ADMIN — PROPOFOL 200 MCG/KG/MIN: 10 INJECTION, EMULSION INTRAVENOUS at 11:42

## 2023-10-09 RX ADMIN — PROPOFOL 70 MG: 10 INJECTION, EMULSION INTRAVENOUS at 11:42

## 2023-10-09 RX ADMIN — SODIUM CHLORIDE, SODIUM LACTATE, POTASSIUM CHLORIDE, CALCIUM CHLORIDE: 600; 310; 30; 20 INJECTION, SOLUTION INTRAVENOUS at 11:37

## 2023-10-09 NOTE — ANESTHESIA POSTPROCEDURE EVALUATION
Patient: Deb Nelson    Procedure: Procedure(s):  EGD, gastroesophageal reflux test with mucosal PH electrode and biopsy       Anesthesia Type:  MAC    Note:  Disposition: Outpatient   Postop Pain Control: Uneventful            Sign Out: Well controlled pain   PONV: No   Neuro/Psych: Uneventful            Sign Out: Acceptable/Baseline neuro status   Airway/Respiratory: Uneventful            Sign Out: Acceptable/Baseline resp. status   CV/Hemodynamics: Uneventful            Sign Out: Acceptable CV status; No obvious hypovolemia; No obvious fluid overload   Other NRE: NONE   DID A NON-ROUTINE EVENT OCCUR? No           Last vitals:  Vitals Value Taken Time   /78 10/09/23 1221   Temp 36.2  C (97.2  F) 10/09/23 1221   Pulse 53 10/09/23 1221   Resp 16 10/09/23 1221   SpO2 99 % 10/09/23 1221       Electronically Signed By: Jose Anderson DO  October 9, 2023  3:35 PM

## 2023-10-09 NOTE — ANESTHESIA CARE TRANSFER NOTE
Patient: Deb Nelson    Procedure: Procedure(s):  EGD, gastroesophageal reflux test with mucosal PH electrode and biopsy       Diagnosis: Chronic cough [R05.3]  Dysphonia [R49.0]  Hoarseness [R49.0]  Gastroesophageal reflux disease with esophagitis without hemorrhage [K21.00]  Diagnosis Additional Information: No value filed.    Anesthesia Type:   MAC     Note:    Oropharynx: oropharynx clear of all foreign objects and spontaneously breathing  Level of Consciousness: drowsy  Oxygen Supplementation: room air    Independent Airway: airway patency satisfactory and stable  Dentition: dentition unchanged  Vital Signs Stable: post-procedure vital signs reviewed and stable  Report to RN Given: handoff report given  Patient transferred to: Phase II  Comments: Vital signs per nursing documentation.       Handoff Report: Identifed the Patient, Identified the Reponsible Provider, Reviewed the pertinent medical history, Discussed the surgical course, Reviewed Intra-OP anesthesia mangement and issues during anesthesia, Set expectations for post-procedure period and Allowed opportunity for questions and acknowledgement of understanding      Vitals:  Vitals Value Taken Time   BP     Temp 36.2  C (97.2  F) 10/09/23 1208   Pulse 58 10/09/23 1208   Resp 16 10/09/23 1208   SpO2 99 % 10/09/23 1208       Electronically Signed By: EMILEE Cali CRNA  October 9, 2023  12:09 PM

## 2023-10-09 NOTE — H&P
Deb Sierrafortunato  [id number]  female  78 year old      Reason for procedure/surgery: throat clearing, hoarseness not on PPI, plan or Bravo off therapy    Patient Active Problem List   Diagnosis    H/O senile osteopenia    Dyspepsia    History of cataract    Generalized osteoarthritis of multiple sites    History of meningioma of the brain    Recurrent cholesteatoma of postmastoidectomy cavity, unspecified laterality    AK (actinic keratosis)    Anatomical narrow angle borderline glaucoma    Brow ptosis    Degenerative, intervertebral disc, cervical    Family history of malignant neoplasm of gastrointestinal tract    Family history of musculoskeletal disease    Fx wrist    Meningioma (H)    Mixed conductive and sensorineural hearing loss of right ear with restricted hearing of left ear    Nonexudative age-related macular degeneration, bilateral, early dry stage    Pseudophakia, both eyes    PVD (posterior vitreous detachment)    Right shoulder pain    Senile osteoporosis       Past Surgical History:    Past Surgical History:   Procedure Laterality Date    APPENDECTOMY  1986    CATARACT IOL, RT/LT      COLONOSCOPY  2015    COLONOSCOPY N/A 6/17/2021    Procedure: COLONOSCOPY, WITH POLYPECTOMY AND BIOPSY;  Surgeon: Mac Soto MD;  Location:  GI    ENT SURGERY  1983    cholesteotoma    ESOPHAGOSCOPY, GASTROSCOPY, DUODENOSCOPY (EGD), COMBINED N/A 6/17/2021    Procedure: ESOPHAGOGASTRODUODENOSCOPY, WITH BIOPSY;  Surgeon: Mac Soto MD;  Location: [facility] EXCISION OF CHOLESTEATOMA, MIDDLE EAR  1982       Past Medical History:   Past Medical History:   Diagnosis Date    Arthritis     Cataract     Glaucoma     Meningioma (H)     Neurosurgeon in [facility]    Osseous obstruction of eustachian tube (aka CHOLESTEOTOMA)        Social History:   Social History     Tobacco Use    Smoking status: Former     Packs/day: 2.00     Years: 25.00     Pack years: 50.00     Types: Cigarettes     Start date: 1/1/1960      Quit date: 1985     Years since quittin.2    Smokeless tobacco: Never   Substance Use Topics    Alcohol use: Yes     Alcohol/week: 1.7 standard drinks of alcohol     Comment: 7 per week       Family History:   Family History   Problem Relation Age of Onset    Colon Cancer Mother     Breast Cancer Mother     Uterine Cancer Mother     Osteoporosis Mother     Dementia Mother     Melanoma Sister     Cerebrovascular Disease Sister 80    Osteoporosis Sister     Coronary Artery Disease Sister 80        EF 50%, s/p ICD    Chronic Obstructive Pulmonary Disease Sister         smoker    Thyroid Disease Sister         not sure which type    Coronary Artery Disease Sister 75        s/p PCI/Federico    Osteoporosis Sister     Cerebrovascular Disease Sister 80    Coronary Artery Disease Sister 80        s/p PCI/DESEAN    Dementia Brother 82    Coronary Artery Disease Brother 80        s/p MI, s/p PCI/DESEAN, s/p ICD    Hypertension Brother     Mental Illness Brother         Schizophrenia/PTSD post [place],  from [facility]    Psychotic Disorder Other         mother, depression; father schizophrenia    Skin Cancer No family hx of        Allergies:   Allergies   Allergen Reactions    Clindamycin Itching and Rash       Active Medications:   Current Outpatient Medications   Medication Sig Dispense Refill    Acetaminophen (TYLENOL PO) Take 325 mg by mouth PRN      calcium carbonate 500 mg (elemental) (OSCAL 500) 1250 (500 Ca) MG TABS tablet Take by mouth daily      mvw complete formulation (CHEWABLES) tablet Take 1 tablet by mouth daily      zoledronic acid (ZOMETA) 4 MG/100ML SOLN Inject 4 mg into the vein once      ofloxacin (FLOXIN) 0.3 % otic solution          Systemic Review:   CONSTITUTIONAL: NEGATIVE for fever, chills, change in weight  ENT/MOUTH: NEGATIVE for ear, mouth and throat problems  RESP: NEGATIVE for significant cough or SOB  CV: NEGATIVE for chest pain, palpitations or peripheral  "edema    Physical Examination:   Vital Signs: BP (!) 155/82 (BP Location: Right arm)   Pulse 62   Temp 97.3  F (36.3  C) (Temporal)   Resp 18   Ht 1.689 m (5' 6.5\")   Wt 71.7 kg (158 lb)   SpO2 98%   BMI 25.12 kg/m    GENERAL: healthy, alert and no distress  NECK: no adenopathy, no asymmetry, masses, or scars  RESP: lungs clear to auscultation - no rales, rhonchi or wheezes  CV: regular rate and rhythm, normal S1 S2, no S3 or S4, no murmur, click or rub, no peripheral edema and peripheral pulses strong  ABDOMEN: soft, nontender, no hepatosplenomegaly, no masses and bowel sounds normal  MS: no gross musculoskeletal defects noted, no edema    Plan: Appropriate to proceed as scheduled.      Jonathan Larson DO  10/9/2023    PCP:  Deb Killian    "

## 2023-10-09 NOTE — ANESTHESIA PREPROCEDURE EVALUATION
Anesthesia Pre-Procedure Evaluation    Patient: Deb Nelson   MRN: [id number] : 1945        Procedure : Procedure(s):  EGD, gastroesophageal reflux test with mucosal PH electrode          Past Medical History:   Diagnosis Date    Arthritis     Cataract     Glaucoma     Meningioma (H)     Neurosurgeon in [facility]    Osseous obstruction of eustachian tube (aka CHOLESTEOTOMA)       Past Surgical History:   Procedure Laterality Date    APPENDECTOMY      CATARACT IOL, RT/LT      COLONOSCOPY      COLONOSCOPY N/A 2021    Procedure: COLONOSCOPY, WITH POLYPECTOMY AND BIOPSY;  Surgeon: Mac Soto MD;  Location:  GI    ENT SURGERY      cholesteotoma    ESOPHAGOSCOPY, GASTROSCOPY, DUODENOSCOPY (EGD), COMBINED N/A 2021    Procedure: ESOPHAGOGASTRODUODENOSCOPY, WITH BIOPSY;  Surgeon: Mac Soto MD;  Location: [facility] EXCISION OF CHOLESTEATOMA, MIDDLE EAR        Allergies   Allergen Reactions    Clindamycin Itching and Rash      Social History     Tobacco Use    Smoking status: Former     Packs/day: 2.00     Years: 25.00     Pack years: 50.00     Types: Cigarettes     Start date: 1960     Quit date: 1985     Years since quittin.2    Smokeless tobacco: Never   Substance Use Topics    Alcohol use: Yes     Alcohol/week: 1.7 standard drinks of alcohol     Comment: 7 per week      Wt Readings from Last 1 Encounters:   10/09/23 71.7 kg (158 lb)        Anesthesia Evaluation            ROS/MED HX  ENT/Pulmonary:  - neg pulmonary ROS     Neurologic:  - neg neurologic ROS     Cardiovascular:  - neg cardiovascular ROS     METS/Exercise Tolerance: >4 METS    Hematologic:  - neg hematologic  ROS     Musculoskeletal:   (+)  arthritis,             GI/Hepatic:  - neg GI/hepatic ROS     Renal/Genitourinary:  - neg Renal ROS     Endo:  - neg endo ROS     Psychiatric/Substance Use:  - neg psychiatric ROS     Infectious Disease:  - neg infectious disease ROS     Malignancy:   - neg malignancy ROS     Other:  - neg other ROS          Physical Exam    Airway  airway exam normal      Mallampati: I   TM distance: > 3 FB   Neck ROM: full   Mouth opening: > 3 cm    Respiratory Devices and Support         Dental       (+) Completely normal teeth      Cardiovascular   cardiovascular exam normal       Rhythm and rate: regular and normal     Pulmonary   pulmonary exam normal        breath sounds clear to auscultation           OUTSIDE LABS:  CBC:   Lab Results   Component Value Date    WBC 4.4 10/03/2022    WBC 4.2 03/17/2021    HGB 13.2 10/03/2022    HGB 13.8 03/17/2021    HCT 41.0 10/03/2022    HCT 41.9 03/17/2021     10/03/2022     03/17/2021     BMP:   Lab Results   Component Value Date     07/10/2023     10/03/2022    POTASSIUM 4.4 07/10/2023    POTASSIUM 4.8 10/03/2022    CHLORIDE 104 07/10/2023    CHLORIDE 99 10/03/2022    CO2 24 07/10/2023    CO2 28 10/03/2022    BUN 16.6 07/10/2023    BUN 15.1 10/03/2022    CR 0.79 07/10/2023    CR 0.81 10/03/2022     (H) 07/10/2023    GLC 98 10/03/2022     COAGS: No results found for: PTT, INR, FIBR  POC: No results found for: BGM, HCG, HCGS  HEPATIC:   Lab Results   Component Value Date    ALBUMIN 4.6 10/03/2022    PROTTOTAL 7.3 10/03/2022    ALT 15 10/03/2022    AST 27 10/03/2022    ALKPHOS 41 10/03/2022    BILITOTAL 0.4 10/03/2022     OTHER:   Lab Results   Component Value Date    A1C 5.3 03/17/2021    LOLA 9.6 07/10/2023    PHOS 2.7 05/09/2022    MAG 2.3 05/09/2022    TSH 2.40 10/03/2022       Anesthesia Plan    ASA Status:  2    NPO Status:  NPO Appropriate    Anesthesia Type: MAC.     - Reason for MAC: Deep or markedly invasive procedure (G8)   Induction: Propofol.   Maintenance: N/A.        Consents    Anesthesia Plan(s) and associated risks, benefits, and realistic alternatives discussed. Questions answered and patient/representative(s) expressed understanding.     - Discussed:     - Discussed with:  Patient        Use of blood products discussed: No .     Postoperative Care            Comments:           H&P reviewed: Unable to attach H&P to encounter due to EHR limitations. H&P Update: appropriate H&P reviewed, patient examined. No interval changes since H&P (within 30 days).         Jose Anderson, DO

## 2023-10-11 LAB
PATH REPORT.ADDENDUM SPEC: NORMAL
PATH REPORT.COMMENTS IMP SPEC: NORMAL
PATH REPORT.FINAL DX SPEC: NORMAL
PATH REPORT.GROSS SPEC: NORMAL
PATH REPORT.MICROSCOPIC SPEC OTHER STN: NORMAL
PATH REPORT.RELEVANT HX SPEC: NORMAL
PHOTO IMAGE: NORMAL

## 2023-10-13 ENCOUNTER — ALLIED HEALTH/NURSE VISIT (OUTPATIENT)
Dept: GASTROENTEROLOGY | Facility: CLINIC | Age: 78
End: 2023-10-13
Payer: MEDICARE

## 2023-10-13 DIAGNOSIS — R05.3 CHRONIC COUGH: Primary | ICD-10-CM

## 2023-10-13 NOTE — PROGRESS NOTES
Bravo monitor and diary returned; accuracy of entries verified with patient.   Ortiz study completion confirmed.  Data uploaded and sent to reading provider for interpretation.  Reading Provider:  Dr Larson

## 2023-10-17 ENCOUNTER — TELEPHONE (OUTPATIENT)
Dept: DERMATOLOGY | Facility: CLINIC | Age: 78
End: 2023-10-17
Payer: MEDICARE

## 2023-10-17 NOTE — TELEPHONE ENCOUNTER
LVM for patient regarding scheduling Return in about 1 year (around 5/19/2024) with Dr. Bañuelos or any other provider that will be here in office. Left call center number for scheduling.

## 2023-11-21 PROCEDURE — 91035 G-ESOPH REFLX TST W/ELECTROD: CPT

## 2023-11-21 PROCEDURE — 99207 PR NO CHARGE NURSE ONLY: CPT

## 2023-12-05 ENCOUNTER — DOCUMENTATION ONLY (OUTPATIENT)
Dept: OTOLARYNGOLOGY | Facility: CLINIC | Age: 78
End: 2023-12-05
Payer: MEDICARE

## 2023-12-05 NOTE — PROGRESS NOTES
Video Esophagram Review Rounds  Imaging Review of MBSS conducted with attending physician Emma Moon and reviewed/discussed with SLP Adrianna Estrada, Alycia Valles, and/or Roberta Babcock and with GI representative from [employer]    Relevant Background:      Recommendation:  Consider reflux gourmet  Voice therapy

## 2023-12-12 ENCOUNTER — OFFICE VISIT (OUTPATIENT)
Dept: OTOLARYNGOLOGY | Facility: CLINIC | Age: 78
End: 2023-12-12
Payer: MEDICARE

## 2023-12-12 ENCOUNTER — PRE VISIT (OUTPATIENT)
Dept: OTOLARYNGOLOGY | Facility: CLINIC | Age: 78
End: 2023-12-12

## 2023-12-12 ENCOUNTER — THERAPY VISIT (OUTPATIENT)
Dept: SPEECH THERAPY | Facility: CLINIC | Age: 78
End: 2023-12-12
Payer: MEDICARE

## 2023-12-12 VITALS
WEIGHT: 162 LBS | HEART RATE: 67 BPM | BODY MASS INDEX: 25.76 KG/M2 | SYSTOLIC BLOOD PRESSURE: 156 MMHG | DIASTOLIC BLOOD PRESSURE: 90 MMHG

## 2023-12-12 DIAGNOSIS — R49.0 HOARSENESS: ICD-10-CM

## 2023-12-12 DIAGNOSIS — R49.0 DYSPHONIA: Primary | ICD-10-CM

## 2023-12-12 DIAGNOSIS — R05.3 CHRONIC COUGH: ICD-10-CM

## 2023-12-12 DIAGNOSIS — R49.0 DYSPHONIA: ICD-10-CM

## 2023-12-12 DIAGNOSIS — K21.00 GASTROESOPHAGEAL REFLUX DISEASE WITH ESOPHAGITIS WITHOUT HEMORRHAGE: ICD-10-CM

## 2023-12-12 PROCEDURE — 99204 OFFICE O/P NEW MOD 45 MIN: CPT | Mod: 25 | Performed by: OTOLARYNGOLOGY

## 2023-12-12 PROCEDURE — 92613 ENDOSCOPY SWALLOW (FEES) I&R: CPT | Performed by: OTOLARYNGOLOGY

## 2023-12-12 PROCEDURE — 92520 LARYNGEAL FUNCTION STUDIES: CPT | Mod: GN | Performed by: SPEECH-LANGUAGE PATHOLOGIST

## 2023-12-12 PROCEDURE — 92610 EVALUATE SWALLOWING FUNCTION: CPT | Mod: GN | Performed by: SPEECH-LANGUAGE PATHOLOGIST

## 2023-12-12 PROCEDURE — 31579 LARYNGOSCOPY TELESCOPIC: CPT | Performed by: OTOLARYNGOLOGY

## 2023-12-12 PROCEDURE — 92524 BEHAVRAL QUALIT ANALYS VOICE: CPT | Mod: GN | Performed by: SPEECH-LANGUAGE PATHOLOGIST

## 2023-12-12 PROCEDURE — 92612 ENDOSCOPY SWALLOW (FEES) VID: CPT | Mod: GN | Performed by: OTOLARYNGOLOGY

## 2023-12-12 ASSESSMENT — PAIN SCALES - GENERAL: PAINLEVEL: NO PAIN (0)

## 2023-12-12 NOTE — PATIENT INSTRUCTIONS
1. Please follow-up in clinic as needed after voice therapy.   Please complete voice therapy  2. Please call the ENT clinic with any questions,concerns, new or worsening symptoms.    -Clinic number is [phone]           Start reflux gourmet  This will form an algae float to prevent regurgitation   Trial right before bedtime or after a big meal  Do not take after having carbonated beverage     This can be bought on the website of the company or on [employer]    [email or web address]

## 2023-12-12 NOTE — LETTER
2023       RE: Deb Nelson  [address]  [facility] [address]     Dear Colleague,    Thank you for referring your patient, Deb Nelson, to the [facility] EAR NOSE AND THROAT CLINIC [place] at [facility]. Please see a copy of my visit note below.        [facility]   at the [facility]   Otolaryngology Clinic     Patient: Deb Nelson    MRN: [id number]    : 1945    Age/Gender: 78 year old female  Date of Service: 2023  Rendering Provider:   Emma Moon MD     Referring Provider   PCP: Deb Killian  Referring Physician: Ofe Suero PA-C  [address] [place] [id number]  Reason for Consultation   Dysphonia  History   HISTORY OF PRESENT ILLNESS: Ms. Nelson is a 78 year old female who presents to us today with dysphonia.          she presents today for evaluation. she reports:    Dysphonia  - works as a nurse and a   - main concern is her voice  - has had endoscopy exams before  - has alot of throat clearing- when she lays down and goes up/down the stairs, never really been a cough  - her throat feels scratchy in the morning  - had a lesion removed from her esophagus in the past  - takes TUMS for intermittent heartburn and reflux  - doesn't feels reflux meds are helpful for her  - Dr. Suero recommended reflux gourmet, haven't used it in a while  - no reports of swallowing or breathing difficulties- no inhalers or breathing assistance      PAST MEDICAL HISTORY:   Past Medical History:   Diagnosis Date     Arthritis      Cataract      Glaucoma      Meningioma (H)     Neurosurgeon in [facility]     Osseous obstruction of eustachian tube (aka CHOLESTEOTOMA)        PAST SURGICAL HISTORY:   Past Surgical History:   Procedure Laterality Date     APPENDECTOMY       CATARACT IOL, RT/LT       COLONOSCOPY       COLONOSCOPY N/A 2021    Procedure: COLONOSCOPY, WITH  POLYPECTOMY AND BIOPSY;  Surgeon: Mac Soto MD;  Location:  GI     ENT SURGERY      cholesteotoma     ESOPHAGOSCOPY, GASTROSCOPY, DUODENOSCOPY (EGD), COMBINED N/A 2021    Procedure: ESOPHAGOGASTRODUODENOSCOPY, WITH BIOPSY;  Surgeon: Mac Soto MD;  Location: [facility] EXCISION OF CHOLESTEATOMA, MIDDLE EAR         CURRENT MEDICATIONS:   Current Outpatient Medications:      Acetaminophen (TYLENOL PO), Take 325 mg by mouth PRN, Disp: , Rfl:      calcium carbonate 500 mg (elemental) (OSCAL 500) 1250 (500 Ca) MG TABS tablet, Take by mouth daily, Disp: , Rfl:      mvw complete formulation (CHEWABLES) tablet, Take 1 tablet by mouth daily, Disp: , Rfl:      ofloxacin (FLOXIN) 0.3 % otic solution, , Disp: , Rfl:      zoledronic acid (ZOMETA) 4 MG/100ML SOLN, Inject 4 mg into the vein once, Disp: , Rfl:     ALLERGIES: Clindamycin    SOCIAL HISTORY:    Social History     Socioeconomic History     Marital status:      Spouse name: Not on file     Number of children: Not on file     Years of education: Not on file     Highest education level: Not on file   Occupational History     Not on file   Tobacco Use     Smoking status: Former     Packs/day: 2.00     Years: 25.00     Additional pack years: 0.00     Total pack years: 50.00     Types: Cigarettes     Start date: 1960     Quit date: 1985     Years since quittin.3     Smokeless tobacco: Never   Vaping Use     Vaping Use: Never used   Substance and Sexual Activity     Alcohol use: Yes     Alcohol/week: 1.7 standard drinks of alcohol     Comment: 7 per week     Drug use: No     Sexual activity: Yes     Partners: Female     Birth control/protection: Post-menopausal   Other Topics Concern     Parent/sibling w/ CABG, MI or angioplasty before 65F 55M? 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 [place]     Social Determinants of  Health     Financial Resource Strain: Not on file   Food Insecurity: Not on file   Transportation Needs: Not on file   Physical Activity: Not on file   Stress: Not on file   Social Connections: Not on file   Interpersonal Safety: Not on file   Housing Stability: Not on file         FAMILY HISTORY:   Family History   Problem Relation Age of Onset     Colon Cancer Mother      Breast Cancer Mother      Uterine Cancer Mother      Osteoporosis Mother      Dementia Mother      Melanoma Sister      Cerebrovascular Disease Sister 80     Osteoporosis Sister      Coronary Artery Disease Sister 80        EF 50%, s/p ICD     Chronic Obstructive Pulmonary Disease Sister         smoker     Thyroid Disease Sister         not sure which type     Coronary Artery Disease Sister 75        s/p PCI/Federico     Osteoporosis Sister      Cerebrovascular Disease Sister 80     Coronary Artery Disease Sister 80        s/p PCI/DESEAN     Dementia Brother 82     Coronary Artery Disease Brother 80        s/p MI, s/p PCI/DESEAN, s/p ICD     Hypertension Brother      Mental Illness Brother         Schizophrenia/PTSD post [place],  [facility]     Psychotic Disorder Other         mother, depression; father schizophrenia     Skin Cancer No family hx of      Non-contributory for problems with anesthesia    REVIEW OF SYSTEMS:   The patient was asked a 14 point review of systems regarding constitutional symptoms, eye symptoms, ears, nose, mouth, throat symptoms, cardiovascular symptoms, respiratory symptoms, gastrointestinal symptoms, genitourinary symptoms, musculoskeletal symptoms, integumentary symptoms, neurological symptoms, psychiatric symptoms, endocrine symptoms, hematologic/lymphatic symptoms, and allergic/ immunologic symptoms.   The pertinent factors have been included in the HPI and below.  Patient Supplied Answers to Review of Systems      2023     2:40 PM    ENT ROS   Ears, Nose, Throat Ringing/noise in ears    Nasal  congestion or drainage    Hoarseness   Gastrointestinal/Genitourinary Heartburn/indigestion       Physical Examination   The patient underwent a physical examination as described below. The pertinent positive and negative findings are summarized after the description of the examination.  Constitutional: The patient's developmental and nutritional status was assessed. The patient's voice quality was assessed.  Head and Face: The head and face were inspected for deformities. The sinuses were palpated. The salivary glands were palpated. Facial muscle strength was assessed bilaterally.  Eyes: Extraocular movements and primary gaze alignment were assessed.  Ears, Nose, Mouth and Throat: The ears and nose were examined for deformities. The nasal septum, mucosa, and turbinates were inspected by anterior rhinoscopy. The lips, teeth, and gums were examined for abnormalities. The oral mucosa, tongue, palate, tonsils, lateral and posterior pharynx were inspected for the presence of asymmetry or mucosal lesions.    Neck: The tracheal position was noted, and the neck mass palpated to determine if there were any asymmetries, abnormal neck masses, thyromegally, or thyroid nodules.  Respiratory: The nature of the breathing and chest expansion/symmetry was observed.  Cardiovascular: The patient was examined to determine the presence of any edema or jugular venous distension.  Abdomen: The contour of the abdomen was noted.  Lymphatic: The patient was examined for infraclavicular lymphadenopathy.  Musculoskeletal: The patient was inspected for the presence of skeletal deformities.  Extremities: The extremities were examined for any clubbing or cyanosis.  Skin: The skin was examined for inflammatory or neoplastic conditions.  Neurologic: The patient's orientation, mood, and affect were noted. The cranial nerve  functions were examined.  Other pertinent positive and negative findings on physical examination:      OC/OP: no lesions,  uvula midline, soft palate elevates symmetrically, left BOT cyst   Neck: no lesions, no TH tenderness to palpation  All other physical examination findings were within normal limits and noncontributory.  Procedures     Video Laryngoscopy with Stroboscopy (CPT 25582)    Preoperative Diagnosis:  Dysphonia and throat symptoms  Postoperative Diagnosis: Dysphonia and throat symptoms  Indication:  Patient has new or persistent dysphonia and throat symptoms.  This requires evaluation by stroboscopy to fully delineate the laryngeal functioning; especially mucosal wave assessment, ultrasharp visualization of lesions on the vocal folds, and overall functioning of the larynx.  Details of Procedure: A 70 degree rigid telescopic laryngoscope or a distal chip flexible scope was used. The lighting was obtained via a light cable connected to a stroboscopic unit. The telescope was inserted either transorally or transnasally until the vocal folds could be visualized. The patient was instructed to sustain the vowel  ee  at a comfortable pitch and loudness as the voice was monitored by a microphone connected to a fundamental frequency tracker. This circuit tracked vocal periodicity, allowing the light to flash in synchrony with the glottal cycles. A setting on the stroboscope was set to change the phase of light strobing with relation to the vocal fundamental frequency, producing an image of 1 to 2 glottal cycles every second. The video images were recorded for analysis. Use of the variable speed, slow and stop scan allowed careful study of pertinent segments of laryngeal function to increase accuracy of clinical assessments of function and dysfunction.  In particular, features of glottal closure, mucosal wave on the vocal fold cover and laryngeal symmetry were analyzed. Lastly, the patient was asked to phonate speech samples and auditory/perceptual evaluation of voice and resonance were performed.  The vocal quality was carefully  evaluated for hoarseness, breathiness, loudness, phrase length and intelligibility to determine the source of dysphonia.    Findings:      B. LARYNGOVIDEOSTROBOSCOPY   Anatomic/Physiological Deviations:  LNC, presbylarynx, supraglottic hyperfunction, postcricoid edema  Mucosal wave: Right:  No restriction     Left: No restriction  Bilateral Vocal Fold Vibration: Symmetric  Vocal Process: Right: No restriction    Left:  No restriction  Vocal Fold closure: Complete glottal closure    Complication(s): None  Disposition: Patient tolerated the procedure well         Fiberoptic Endoscopic Evaluation of Swallowing (CPT 52927)  and Interpretation of Swallowing (CPT 66988)    Indications: See above notes for complete history and physical. Patient complains of dysphagia to both solids and liquids and/or there is suggestion on history and endoscopic exam of the presence of dysphagia causing medical complaints.  Swallowing evaluation is being performed to assess the presence and degree of dysphagia, and to recommend a safe diet.     Pulmonary Status:  No PNA   Current Diet:              regular                                        thin liquids      Consistency Amounts:  Thin Liquid: sip   Puree: sip  Solid: cookies            Positioning: upright in a chair  Oral Peripheral Exam: See physical exam section.  Anatomic Notes: See Videostroboscopy report for assessment of anatomy and laryngeal functioning  Pharyngeal secretions prior to administration of liquid or food: No   Oral Phase Abnormal Findings: No abnormal behavior observed   Pharyngeal Phase Abnormal Findings: no penetration, no aspiration     Recommended Diet:  regular                                        thin liquids            Review of Relevant Clinical Data   I personally reviewed:  Notes: Dr. Emma Moon, 12/5/23  Video Esophagram Review Rounds  Imaging Review of MBSS conducted with attending physician Emma Moon and reviewed/discussed with CRYSTAL Rodas  Sean, Alycia Valles, and/or Roberta Babcock and with GI representative from [facility]     Relevant Background:      Recommendation:  Consider reflux gourmet  Voice therapy     Dr. Ofe Suero, 7/14/23, [facility]  Assessment/Plan:    Deb Nelson is a 78 year old female with significant past medical history pertinent for hearing loss, meningioma, osteopenia who is presenting as a follow-up however as a new patient to myself with chief complaint of GERD/dysphonia/hoarseness/chronic throat clearing/cough.     #GERD w/ Extraesophageal Symptoms  Previously patient was seen by Dr. Mac Soto (5/2021) with concerns of reflux, dyspepsia and heartburn.  At this time she reported that she had recurrence approximately 4 years prior for which she used omeprazole for approximately 1 month with resolution of symptoms.     Today Deb presents with dysphonia, chronic throat clearing/cough and voice hoarseness onset approximately 6 months prior.  Additionally, she is experiencing symptoms of substernal chest discomfort, heartburn and regurgitation.  Symptoms of chest discomfort are occurring 1-2 times monthly which she notes to awake her from sleeps. Currently managed with as needed use of TUMs/Gaviscon.      EGD 6/2021 notable for a healing ulcer within the esophagus at 29 cm from the incisors.  Biopsies with no histopathological abnormality.  No evidence of eosinophils or other inflammatory cells.     To objectively measure the amount of acid exposure patient is agreeable to undergoing EGD with BRAVO study off PPI therapy. Pending results adjustments in acid suppressive therapy will be made as patient wishes to proceed with lifestyle modifications prior to initiation of acid suppressive therapy. Differential for dysphonia/chronic cough and throat clearing includes reflux, irritable larynx syndrome, PND, functional heartburn, esophageal hypersensitivity vs other.      - Consider adding reflux gourmet or Gaviscon  with alginate component nightly  - Reflux lifestyle modifications as directed within the AVS   - Upper endoscopy with BRAVO study off acid suppressive therapy   - Consultation with ENT Dr. Moon      #Colorectal Cancer Screening   Colonoscopy 6/17/2020 notable for millimeter ascending colonic polyp consistent with lymphoid aggregate.  Colonoscopy 2015 that was unremarkable. Family history pertinent mother diagnosed with CRC in 40s.      Dr. Ezekiel Sen, Neurosurgery, 12/16/16  Assessment/Plan     1. Meningioma (CMS/HCC) (Primary)  Assessment & Plan:  Three year follow-up for right suboccipital meningioma without symptoms. MRI looks similar to last study and there has not been much change since 2007.  Imp: asymptomatic meningioma  Plan: R/V with MRI scan in three years with MRI        Radiology: MR Brain w/ and w/o contrast 11/1/22  Impression:     1. Dural based mass along the right occipital convexity likely  represents meningioma.  2. Mild Leukoaraiosis.    Pathology: surgical pathology 10/9/23  Addendum   A.  STOMACH, NODULE, BIOPSY:  - No H. pylori organisms identified (supported by a Helicobacter immunohistochemical stain)      Addendum electronically signed by Pete May DO on 10/11/2023 at 12:59 PM   Final Diagnosis   A.  STOMACH, NODULE, BIOPSY:  - Reactive gastropathic changes, in a background of mild chronic gastritis; see comment  - An immunohistochemical stain to rule out Helicobacter will be performed and reported in an addendum  - No intestinal metaplasia identified     B.  STOMACH, BIOPSY:  - Reactive gastropathy with mild chronic inactive gastritis  - No H. pylori-like organisms identified on routine staining  - No intestinal metaplasia identified       Procedures: pH test 10/13/23  Bravo (wireless pH)   This study was inconclusive for GERD.  The overall % acid exposure of 4.2% and DeMeester overall of 17.9 was inconclusive for ongoing reflux.  There were 30 occurrences of cough and 3 of  "heartburn symptoms. SI was 6.7 (2/30) and 0 (0/3) respectively overall for acid reflux which indicates a less than complete correlation. The SAP was 0 and 0 respectively indicating a less than complete correlation. Two days were >4.0 AET this making the study inconclusive for GERD that being said, SI and SAP was negative for both symptoms reported. The number of reflux events on each day were within normal limits. Interpret within clinical context.     Day 1  4.9% acid exposure, DeMeester Score 23.6   Day 2  4.3% acid exposure, DeMeester Score 13.0   Day 3  3.7% acid exposure, DeMeester Score 11.9   Day 4  4.0% acid exposure, DeMeester Score 12.9     Upper GI endoscopy 10/19/23  Impression:    - Z-line regular, 40 cm from the incisors.                          - Normal esophagus.                          - Gastroesophageal flap valve classified as Hill Grade                          I (prominent fold, tight to endoscope).                          - Erythematous mucosa in the antrum. Biopsied.                          - A single mucosal papule (nodule) found in the                          stomach. Biopsied.                          - Normal duodenal bulb, first portion of the duodenum,                          second portion of the duodenum and examined duodenum.                          - The BRAVO pH capsule was positioned 34 cm from the                          incisors, which was 6 cm proximal to the GE junction.     Labs:  Lab Results   Component Value Date    TSH 2.40 10/03/2022     Lab Results   Component Value Date     07/10/2023    CO2 24 07/10/2023    BUN 16.6 07/10/2023    PHOS 2.7 05/09/2022     Lab Results   Component Value Date    WBC 4.4 10/03/2022    HGB 13.2 10/03/2022    HCT 41.0 10/03/2022    MCV 98 10/03/2022     10/03/2022     No results found for: \"PT\", \"PTT\", \"INR\"  No results found for: \"NATALYA\"  No components found for: \"RHEUMATOIDFACTOR\", \"RF\"  No results found for: \"CRP\"  No " "components found for: \"CKTOT\", \"URICACID\"  No components found for: \"C3\", \"C4\", \"DSDNAAB\", \"NDNAABIFA\"  No results found for: \"MPOAB\"    Patient reported Quality of Life (QOL) Measures   Patient Supplied Answers To VHI Questionnaire      12/7/2023     2:55 PM   Voice Handicap Index (VHI-10)   My voice makes it difficult for people to hear me 1   People have difficulty understanding me in a noisy room 2   My voice difficulties restrict my personal and social life.  0   I feel left out of conversations because of my voice 0   My voice problem causes me to lose income 0   I feel as though I have to strain to produce voice 0   The clarity of my voice is unpredictable 2   My voice problem upsets me 2   My voice makes me feel handicapped 0   People ask, \"What's wrong with your voice?\" 1   VHI-10 8         Patient Supplied Answers To EAT Questionnaire      12/7/2023     2:56 PM   Eating Assessment Tool (EAT-10)   My swallowing problem has caused me to lose weight 0   My swallowing problem interferes with my ability to go out for meals 0   Swallowing liquids takes extra effort 0   Swallowing solids takes extra effort 0   Swallowing pills takes extra effort 0   Swallowing is painful 0   The pleasure of eating is affected by my swallowing 0   When I swallow food sticks in my throat 0   I cough when I eat 0   Swallowing is stressful 0   EAT-10 0         Patient Supplied Answers To CSI Questionnaire      12/7/2023     2:56 PM   Cough Severity Index (CSI)   My cough is worse when I lie down 0   My coughing problem causes me to restrict my personal and social life 0   I tend to avoid places because of my cough problem 0   I feel embarrassed because of my coughing problem 2   People ask, ''What's wrong?'' because I cough a lot 1   I run out of air when I cough 0   My coughing problem affects my voice 0   My coughing problem limits my physical activity 0   My coughing problem upsets me 0   People ask me if I am sick because I cough " a lot 0   CSI Score 3         Patient Supplied Answers to Dyspnea Index Questionnaire:       No data to display                Impression & Plan     IMPRESSION: Ms. 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 Chronic throat clearing   - started 1 year ago   - has stayed the same overtime  - former history of cigarettes usage  - no chest xray workup  - allergy triggers and work up: denies  - pulmonary triggers and work up: denies  - GI triggers and work up: working with GI, deMeester 17, poor correlation with cough, EGD normal   - ENT triggers and work up: throat clears with talking and when she lays down  - strobe shows presbylarynx, supraglottic hyperfunction, postcricoid edema  - FEES shows no penetration, no aspiration  - symptoms likely due to irritable larynx syndrome and laryngopharyngeal reflux   - discussed reflux gourmet, trial   Plan  - cough/throat clearing suppression therapy  - reflux gourmet at bedtime and after meals      RETURN VISIT: as needed after voice therapy    Thank you for the kind referral and for allowing me to share in the care of Ms. Nelson. If you have any questions, please do not hesitate to contact me.    Scribe Disclosure:   I, Jessica Degroot, am serving as a scribe; to document services personally performed by Emma Moon MD -based on data collection and the provider's statements to me.     Provider Disclosure:  I agree with above History, Review of Systems, Physical exam and Plan.  I have reviewed the content of the documentation and have edited  it as needed. I have personally performed the services documented here and the documentation accurately represents those services and the decisions I have made.        Emma Moon MD    Laryngology    [facility]  Department of  Otolaryngology - Head and Neck Surgery  [facility] [address] [address]  Appointment line: [phone]  Fax: [phone]  [email or web address]

## 2023-12-12 NOTE — PATIENT INSTRUCTIONS
Who you saw today:  Tg Mayer M.M. (voice), M.A., CCC-SLP  Speech-Language Pathologist  TAZ Trained Vocologist  [facility]  she/her  [email or web address]  Ana Mt is best communication          Muscle Tension Dysphonia    One of the most common voice disorders we treat at the [facility] is Muscle Tension Dysphonia (MTD).  Although this may sound dramatic, it is just a medically specific way of saying that the sound of a person's voice is negatively affected by the way the muscles involved in voicing are working together.  It is what is known as a functional disorder, meaning that there is nothing structurally wrong with a person's larynx.  Rather the muscles and systems which allow us to breathe, voice, and shape that sound are out of balance. For many of us, not only does our voice play a large role in our daily lives, but it is part of our sense of self, so the overall impact of MTD cannot be overstated.  Symptoms of Muscle Tension Dysphonia  Individuals experience MTD in very different ways including:  Effects on overall voice quality  patients describe their voice as rough, hoarse, gravelly, raspy, breathy, hollow, backward, shaky, halting, pitch changes, etc  Effects on daily function  Increased effort to use voice, reduced endurance, voice breaks / cutting out, decreased loudness, total loss of voice  Changes to singing voice  Loss of range, rapid fatigue, loss of vocal agility, decreased ease even with  easy  repertoire  Other effects on the throat  Throat discomfort or pain, frequent throat clearing, neck tenderness, sensation of a lump in the throat     Causes of Muscle Tension Dysphonia  There are many specific, individual reasons why use of the vocal mechanism becomes abnormal.  Some common causes are prolonged illness, longstanding patterns of overuse, and trauma, such as a physical injury, chemical exposure, or an emotionally traumatic event. Often  the inciting event causes a person to adapt how they are using their voice temporarily, and even when the initial event is resolved these patterns persist, taking on a life of their own, yielding problems as listed above.     The onset of MTD can also be very subtle. Small amounts of added effort to achieve what feels like a stronger or clearer voice contributes to a vicious cycle in which more and more effort is required.  This cycle may continue for months or even years before the individual becomes aware that there is a problem with the voice.    The reality is, most of the time when we see people in clinic, the onset of their voice issue is long in the past or otherwise unclear. As a result we are often not able to say for certain how these patterns came to be, but thankfully that does not make their treatment any less effective.    Diagnosing Muscle Tension Dysphonia  A thorough evaluation is required in order to diagnose MTD. This involves skilled clinicians listening to a patient's voice across a variety of tasks, visually evaluating the structure and function of the larynx via nasal endoscopy, and may also utilize aerodynamic and acoustic assessment.   Among other factors the laryngologist and speech language pathologist are looking for patterns of muscular involvement beyond what is needed for clear sound. This frequently results in a squeezed or compressed appearance limiting the view of the vocal folds, often corresponding to the patient's experience of vocal effort.  Treatment of Muscle Tension Dysphonia  The primary treatment for MTD is functional voice therapy, and in nearly all cases it is the only form of treatment a patient will need. The amount of time required to complete functional therapy averages between 4 and 8 sessions, though each individual is unique, and their journey toward vocal health may require more or less time. In cases of emotional stress, counseling or stress management may be  helpful or even necessary.    On very rare occasions other medical interventions may be warranted. However, these are never used as a first line of intervention, and are best discussed between patient, speech pathologist, and laryngologist when and if the need arises.           Functional Speech Therapy   with the [facility] Speech Team    What is functional speech therapy?    Speech therapy to address issues around voice, breathing, cough, and other throat symptoms can be tremendously beneficial. All of the [facility] Speech Language Pathologists (SLP) are specially trained to help individuals with a large range of diagnosis and disorders encompassing:      Structural changes to the larynx - nodules, polyps, granulomas, etc   Issues affecting the nerves of the larynx - vocal fold paralysis, laryngeal dystonia, etc  Inefficient or damaging patterns of muscle use in the larynx and subsystems of voice and breathing    Our larynx (voice box) lives at the intersection of not only voice, but breathing, swallowing, and cough as well.  A change to any one of those can result in an imbalance that affects the others. For example a person who coughs when they breathe deeply might adapt by taking smaller breaths, but then their voice (which relies on steady generous airflow) becomes hoarse.  Or, an individual with a small polyp on their vocal folds feels like they have to  force  their voice out, and this extra effort winds up causing throat pain and swallowing discomfort. It is our job as specialists in functional therapy to help tease apart these threads and find the most efficient patterns of muscle use possible, which can resolve symptoms and give the body the opportunity to heal fully.     Basics of functional speech therapy    If you are receiving this handout, it's likely that you have already had a thorough evaluation with one of the Physicians and/or Speech Language Pathologists (SLP) at the [facility]  Voice Clinic.  This is a crucial first step to identifying where help is needed. From that point forward, functional therapy takes place in a course of personalized sessions (each approximately 45 minutes in length) with your SLP. The exact number of sessions is specific to each patient, but your speech pathologist will give you a sense of what to expect based on your specific diagnoses and what they observed during your evaluation.  These sessions are scheduled over the course of several months, often spacing out from weekly or bi-weekly sessions to once per month or beyond. Changing the patterns of how we use our throats is like any physical activity, which means that practice is elena.  The responsibility will be on you to practice the techniques you were given between sessions.  The strategies which prove most helpful will be advanced each time you are seen, and even the techniques which don't seem as useful will guide your clinician on which avenues to try next. The good news is if you stay diligent, most patients notice improvement within the first 3 sessions.     Health insurance coverage for functional speech therapy    The ability to use your voice, breathe comfortably during athletics, or live without throat pain is considered a normal function.  If something is disordered, restoring it is considered medically necessary.  Most insurance companies recognize this, and therapy is covered under most policies.    The functional therapy offered at [facility] is a form of speech therapy.  If your insurance policy covers rehabilitation services such as physical therapy and speech therapy, your therapy is likely covered. Remember,  covered  does not mean that there is no cost as deductibles or copays may apply.   We encourage you to check both coverage and cost with your insurance company so that you don't get any unwanted surprises.  Remember to ask about SPEECH therapy, not VOICE therapy as insurance  companies may confuse this with something uncovered like  voice lessons .  They will very likely want to know the procedure (CPT) code for the service, as well as any diagnosis codes (Dx) associated with the therapy.     [facility] Voice Clinic Procedure codes  Treatment: this is the code associated with your therapy sessions  CPT 34445    Evaluation: these codes may be associated with your initial evaluation, and may be used again if you and your clinician decide repeat evaluation is needed during the course of therapy  CPT 51374 - Perceptual Evaluation of Voice and Resonance   CPT 08354 - Aerodynamic and Acoustic assessment of Laryngeal Function  CPT 75060 - Laryngeal Endoscopy with Videostroboscopy    Diagnosis (ICD-10) Codes - Based on your evaluation       Before bed to help symptoms.          Laryngopharyngeal Reflux Disease    What is Laryngopharyngeal Reflux Disease (LPRD)  Gastroesophageal Reflux Disease (GERD) is a well-known problem in this [place].  GERD occurs when stomach acid makes its way back up the esophagus, causing indigestion, stomach upset, and heartburn. In some cases the acid may travel all the way up the esophagus, and spill over into the larynx (voice box) and pharynx (back of your throat) resulting in what is then called Laryngo Pharyngeal Reflux Disease (LPRD). This is especially common during sleep, when the individual is lying down, and acid does not have to fight gravity to move up the esophagus.  However, it can also affect individuals while awake.      What are the symptoms of Laryngopharyngeal Reflux Disease  LPRD symptoms vary from person to person, but may include:   a dry, choking sensation, especially during the night  a sour taste in your mouth upon waking up  a raw, burning sensation in the throat  a sensation of a lump in the throat  pain in the throat, neck, or running from the back of the chin along the neck  frequent coughing or desire to clear the throat  Changes to voice  quality  worst in the morning and improving over the day  quality that deteriorates quickly with use  frequently described as: gritty, rough, hoarse    Some individuals with LPRD may also experience esophageal and stomach related symptoms such as heartburn and indigestion.  However, others may experience no such  classic  reflux symptoms at all. The reason for these differences is that while our esophagus is equipped to handle small amounts of reflux, the tissues of the larynx and pharynx are not. In this case it's like a burglar sneaks past the normal security guards only to set off the alarm when they open the safe.     How is Laryngopharyngeal Reflux Disease evaluated?  At the [facility], evaluations of any of the symptoms listed above include a thorough evaluation by a multidisciplinary team of a Laryngologist and Speech Language Pathologist (SLP) including visualizing the larynx with Nasal Endoscopy.  There may be signs during that evaluation that point toward LPRD playing a role in an individual's symptoms such as redness, irritation, or even ulceration. However, while the larynx and pharynx may be affected in the case of LPRD, the stomach is the actual origin of the reflux.  As a result, ultimate diagnosis and long term management of reflux in any form should be in the hands of the stomach specialists in Gastroenterology (GI).  Evaluation with GI may include an upper endoscopy to evaluate the esophagus and stomach, or even probes which assess the presence of reflux over several days.      How is Laryngopharyngeal Reflux Disease treated?   Within our practice your Laryngologist may recommend a course of anti-reflux medication based on your symptoms and evaluation.  This is known as empiric treatment, and it is very reasonable and even prudent for short-term management.  Additionally, should functional speech therapy be recommended, your Speech Language Pathologist (SLP) may recommend strategies to  reduce voice and throat symptoms, optimize the health of your larynx and pharynx, and even some basic healthy changes that can reduce reflux in some individuals. However, for long-term management strategies it is important to maintain contact with your GI care team.

## 2023-12-12 NOTE — PROGRESS NOTES
[facility]  [facility] - Dept. of Otolaryngology   Evaluation report - IN PERSON APPOINTMENT    Clinician: Tg Mayer M.M. (voice), M.A., CCC-SLP  Seen in conjunction with: Dr. 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      Salient history: She has a history significant for dysphonia and coughing, and bilateral hearing loss. Appendectomy 1986, mastoidectomy 1982.     Lives with wife, Lesli, not here today.  CURRENT SYMPTOMS INCLUDE  VOICE: PRIMARY COMPLAINT  Poor, unpredictable voice quality, which has worsened since February of this year with no inciting event.    THROAT ISSUES:   Chronic dry throat clearing, mild to moderate in severity    RESPIRATION:   No issues    SWALLOWING:   No issues    ADDITIONAL:  Reflux: denies significant symptoms    OTHER PERTINENT HISTORY  Past Medical History:   Diagnosis Date    Arthritis     Cataract     Glaucoma     Meningioma (H)     Neurosurgeon in [facility]    Osseous obstruction of eustachian tube (aka CHOLESTEOTOMA)      Past Surgical History:   Procedure Laterality Date    APPENDECTOMY  1986    CATARACT IOL, RT/LT      COLONOSCOPY  2015    COLONOSCOPY N/A 6/17/2021    Procedure: COLONOSCOPY, WITH POLYPECTOMY AND BIOPSY;  Surgeon: Mac Soto MD;  Location:  GI    ENT SURGERY  1983    cholesteotoma    ESOPHAGOSCOPY, GASTROSCOPY, DUODENOSCOPY (EGD), COMBINED N/A 6/17/2021    Procedure: ESOPHAGOGASTRODUODENOSCOPY, WITH BIOPSY;  Surgeon: Mac Soto MD;  Location: [facility] EXCISION OF CHOLESTEATOMA, MIDDLE EAR  1982       OBJECTIVE  PATIENT REPORTED MEASURES  Patient Supplied Answers To VHI Questionnaire      12/7/2023     2:55 PM   Voice Handicap Index (VHI-10)   My voice makes it difficult for  "people to hear me 1   People have difficulty understanding me in a noisy room 2   My voice difficulties restrict my personal and social life.  0   I feel left out of conversations because of my voice 0   My voice problem causes me to lose income 0   I feel as though I have to strain to produce voice 0   The clarity of my voice is unpredictable 2   My voice problem upsets me 2   My voice makes me feel handicapped 0   People ask, \"What's wrong with your voice?\" 1   VHI-10 8       Patient Supplied Answers To CSI Questionnaire      12/7/2023     2:56 PM   Cough Severity Index (CSI)   My cough is worse when I lie down 0   My coughing problem causes me to restrict my personal and social life 0   I tend to avoid places because of my cough problem 0   I feel embarrassed because of my coughing problem 2   People ask, ''What's wrong?'' because I cough a lot 1   I run out of air when I cough 0   My coughing problem affects my voice 0   My coughing problem limits my physical activity 0   My coughing problem upsets me 0   People ask me if I am sick because I cough a lot 0   CSI Score 3       Patient Supplied Answers To EAT Questionnaire      12/7/2023     2:56 PM   Eating Assessment Tool (EAT-10)   My swallowing problem has caused me to lose weight 0   My swallowing problem interferes with my ability to go out for meals 0   Swallowing liquids takes extra effort 0   Swallowing solids takes extra effort 0   Swallowing pills takes extra effort 0   Swallowing is painful 0   The pleasure of eating is affected by my swallowing 0   When I swallow food sticks in my throat 0   I cough when I eat 0   Swallowing is stressful 0   EAT-10 0       PERCEPTUAL EVALUATION (CPT 39404)  POSTURE / TENSION/ PALPATION OF THE LARYNGEAL AREA:   upper body  neck and shoulders    BREATHING:   appears within normal limits and adequate     LARYNGEAL PALPATION:   firm musculature    VOICE:  Omar states that today is a typical voice today, with " clinician observing voice quality to be characterized as:  Roughness: Mild to moderate Intermittent  Breathiness: Mild Intermittent  Strain: Mild Intermittent  Overall, mild asthenia  Pitch:  F0/Habitual/Conversational speech: informally judged as WNL  Loudness  Conversational speech:  Mildly reduced  Projected speech:  Mild to moderately reduced  Singing vs. Speech:  n/a  GLOBAL ASSESSMENT OF DYSPHONIA: 30/100    COUGH/THROAT CLEARING:  Intermittent mild to moderate dry throat clearing    LARYNGEAL FUNCTION STUDIES (CPT 43854)  /a/ mean f0 = 190.448 Hz (SD = 46.214)  /i/ mean f0 = 179.769 Hz (SD = 4.383)  Glide:     Lowest f0 = 75.179 Hz      Higest f0 = 747.554 Hz      Range = 672.374 Hz   Connected Speech     Mean f0 = 164.790 Hz (SD 40.752)     Median f0 = 154.258 Hz      Lowest f0 = 102.178 Hz      Highest f0 = 490.724 Hz      Speaking Range = 388.546 Hz           LARYNGEAL EXAMINATION  Procedure: Flexible endoscopy with chip-tip technology with stroboscopy, right nostril; topical anesthesia with 2% lidocaine and 0.025% oxymetazoline was sprayed into the nasal cavity and allowed 3 to 5 minutes for effect.  Performed by: Dr. Moon  The laryngeal and pharyngeal structures were evaluated for gross appearance, mobility, function, and focal lesions / abnormalities of the associated mucosa.  Stroboscopy was warranted to evaluate closure, symmetry, and vibratory characteristics of the vocal folds.  All findings were within normal limits with the exception of the following salient features:     This exam shows the following:    Posterior commissure: mild inflammation.  Secretions: mildly present    Movement:  Right Vocal Fold: Normal  Left Vocal Fold: Normal  Supraglottic hyperfunction during connected speech tasks: moderate 4 way constriction    Glottic Closure: mildly incomplete/ weak  Upper Airway: Patent    Vocal Fold Findings:  Right Vocal Fold: subtle bowing along the margin  Left Vocal Fold: subtle bowing along  the margin           The laryngeal exam was reviewed with Ms. Nelson, and I provided pertinent              explanations, as well as written and oral information.     The addition of stroboscopy allowed evaluation of the mucosal wave:  Amplitude: right: WNL; left: WNL  Symmetry: intermittent symmetry.  Closure pattern: spindle-shaped.   Closure plane: at glottic level.   Phase distribution: normal.    The laryngeal exam was reviewed with Ms. Nelson, and I provided pertinent explanations, as well as written and oral information.            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    Remarkable findings included:  Perceptual evaluation demonstrated:   Roughness: Mild to moderate Intermittent  Breathiness: Mild Intermittent  Strain: Mild Intermittent  Overall, mild asthenia  Pitch:  F0/Habitual/Conversational speech: informally judged as WNL  Loudness  Conversational speech:  Mildly reduced  Laryngeal exam demonstrated:   Supraglottic hyperfunction during connected speech tasks: moderate 4 way constriction  Glottic Closure: mildly incomplete/ weak  Upper Airway: Patent  Vocal Fold Findings:  Right Vocal Fold: subtle bowing along the margin  Left Vocal Fold: subtle bowing along the margin     Laryngeal function studies show significant increase in trans-glottal airflow compared to age and gender matched norms, increased inferred subglottal pressure, and significantly elevated AVQI.    Primary complaint of patient today included: Dysphonia, problems lecturing.    Therefore, we recommended a course of speech therapy to address these concerns.      STIMULABILITY: results of therapy probes during perceptual and laryngeal evaluation demonstrate improvement with use of  forward resonant stimuli, coordination of respiration and phonation, and use of yawn sigh    RECOMMENDATIONS:   A course of speech therapy is recommended to optimize vocal technique, improve voice quality, promote reduced discomfort, effort and fatigue, and help reduce chronic cough, throat clear, and mucosal irritation.  She demonstrates a Good prognosis for improvement given adherence to therapeutic recommendations.   Positive indicators: positive response to therapy probes diagnosis is known to respond to treatment high level of comittment  Negative indicators: none  Research: This patient is willing to be contacted about participation in research. May be eligible for MTD study.    DURATION / FREQUENCY: 4 one-hour sessions.  A total of 6-8 sessions may be necessary.    GOALS:  Patient goal:   To improve and maintain a healthy voice quality  To understand the problem and fix it as much as possible  To have a normal and acceptable voice quality  To reduce her throat clearingto acceptable levels    Short-term goal(s): Within the first 4 sessions, Ms. Nelson:  will be able to demonstrate provided throat clearing suppression and substitution strategies from memory independently with 90% accuracy  will be able to independently list key factors in maintenance of good vocal hygiene with 80% accuracy, and report on their use outside the therapy room.  will utilize silent inhalation with good low-respiratory engagement 75% of the time during therapy tasks with minimal clinician support  will demonstrate semi-occluded vocal tract (SOVT) exercises with at least 80% accuracy with no clinician support    Long-term goal(s): In 6 months, Ms. Nelson will:  Report resolution of dysphonia, and use of optimal voice quality to meet personal, social, and professional needs, 90% of the time during a typical week of vocal activities    Certification period:   Certification date from: 12/12/23  Certification date to:  3/11/24      This treatment plan was developed with the patient who agreed with the recommendations.  And after visit summary was provided, and she was added to my colleague, Dr. Goldsmith's schedule.    TOTAL SERVICE TIME: 60 minutes  EVALUATION OF VOICE AND RESONANCE (76126)  LARYNGEAL FUNCTION STUDIES (48907)  NO CHARGE FACILITY FEE (77873)      Tg Mayer M.M. (voice), MLesterA., CCC/SLP  Speech-Language Pathologist  I Trained Vocologist  [facility]  [phone]  [email or web address]  Pronouns: she/her      *this report was created in part through the use of computerized dictation software, and though reviewed following completion, some typographic errors may persist.  If there is confusion regarding any of this notes contents, please contact me for clarification

## 2023-12-12 NOTE — PROGRESS NOTES
[facility]   at the [facility]   Otolaryngology Clinic     Patient: Deb Nelson    MRN: [id number]    : 1945    Age/Gender: 78 year old female  Date of Service: 2023  Rendering Provider:   Emma Moon MD     Referring Provider   PCP: Deb Killian  Referring Physician: Ofe Suero PA-C  [address] [place] [id number]  Reason for Consultation   Dysphonia  History   HISTORY OF PRESENT ILLNESS: Ms. Nelson is a 78 year old female who presents to us today with dysphonia.          she presents today for evaluation. she reports:    Dysphonia  - works as a nurse and a   - main concern is her voice  - has had endoscopy exams before  - has alot of throat clearing- when she lays down and goes up/down the stairs, never really been a cough  - her throat feels scratchy in the morning  - had a lesion removed from her esophagus in the past  - takes TUMS for intermittent heartburn and reflux  - doesn't feels reflux meds are helpful for her  - Dr. Suero recommended reflux gourmet, haven't used it in a while  - no reports of swallowing or breathing difficulties- no inhalers or breathing assistance      PAST MEDICAL HISTORY:   Past Medical History:   Diagnosis Date    Arthritis     Cataract     Glaucoma     Meningioma (H)     Neurosurgeon in [facility]    Osseous obstruction of eustachian tube (aka CHOLESTEOTOMA)        PAST SURGICAL HISTORY:   Past Surgical History:   Procedure Laterality Date    APPENDECTOMY      CATARACT IOL, RT/LT      COLONOSCOPY      COLONOSCOPY N/A 2021    Procedure: COLONOSCOPY, WITH POLYPECTOMY AND BIOPSY;  Surgeon: Mac Soto MD;  Location:  GI    ENT SURGERY      cholesteotoma    ESOPHAGOSCOPY, GASTROSCOPY, DUODENOSCOPY (EGD), COMBINED N/A 2021    Procedure: ESOPHAGOGASTRODUODENOSCOPY, WITH BIOPSY;  Surgeon: Mac Soto MD;  Location: [facility] EXCISION OF CHOLESTEATOMA, MIDDLE EAR          CURRENT MEDICATIONS:   Current Outpatient Medications:     Acetaminophen (TYLENOL PO), Take 325 mg by mouth PRN, Disp: , Rfl:     calcium carbonate 500 mg (elemental) (OSCAL 500) 1250 (500 Ca) MG TABS tablet, Take by mouth daily, Disp: , Rfl:     mvw complete formulation (CHEWABLES) tablet, Take 1 tablet by mouth daily, Disp: , Rfl:     ofloxacin (FLOXIN) 0.3 % otic solution, , Disp: , Rfl:     zoledronic acid (ZOMETA) 4 MG/100ML SOLN, Inject 4 mg into the vein once, Disp: , Rfl:     ALLERGIES: Clindamycin    SOCIAL HISTORY:    Social History     Socioeconomic History    Marital status:      Spouse name: Not on file    Number of children: Not on file    Years of education: Not on file    Highest education level: Not on file   Occupational History    Not on file   Tobacco Use    Smoking status: Former     Packs/day: 2.00     Years: 25.00     Additional pack years: 0.00     Total pack years: 50.00     Types: Cigarettes     Start date: 1960     Quit date: 1985     Years since quittin.3    Smokeless tobacco: Never   Vaping Use    Vaping Use: Never used   Substance and Sexual Activity    Alcohol use: Yes     Alcohol/week: 1.7 standard drinks of alcohol     Comment: 7 per week    Drug use: No    Sexual activity: Yes     Partners: Female     Birth control/protection: Post-menopausal   Other Topics Concern    Parent/sibling w/ CABG, MI or angioplasty before 65F 55M? 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 [place]     Social Determinants of Health     Financial Resource Strain: Not on file   Food Insecurity: Not on file   Transportation Needs: Not on file   Physical Activity: Not on file   Stress: Not on file   Social Connections: Not on file   Interpersonal Safety: Not on file   Housing Stability: Not on file         FAMILY HISTORY:   Family History   Problem Relation Age of Onset    Colon  Cancer Mother     Breast Cancer Mother     Uterine Cancer Mother     Osteoporosis Mother     Dementia Mother     Melanoma Sister     Cerebrovascular Disease Sister 80    Osteoporosis Sister     Coronary Artery Disease Sister 80        EF 50%, s/p ICD    Chronic Obstructive Pulmonary Disease Sister         smoker    Thyroid Disease Sister         not sure which type    Coronary Artery Disease Sister 75        s/p PCI/Federico    Osteoporosis Sister     Cerebrovascular Disease Sister 80    Coronary Artery Disease Sister 80        s/p PCI/DESEAN    Dementia Brother 82    Coronary Artery Disease Brother 80        s/p MI, s/p PCI/DESEAN, s/p ICD    Hypertension Brother     Mental Illness Brother         Schizophrenia/PTSD post [place],  from [facility]    Psychotic Disorder Other         mother, depression; father schizophrenia    Skin Cancer No family hx of      Non-contributory for problems with anesthesia    REVIEW OF SYSTEMS:   The patient was asked a 14 point review of systems regarding constitutional symptoms, eye symptoms, ears, nose, mouth, throat symptoms, cardiovascular symptoms, respiratory symptoms, gastrointestinal symptoms, genitourinary symptoms, musculoskeletal symptoms, integumentary symptoms, neurological symptoms, psychiatric symptoms, endocrine symptoms, hematologic/lymphatic symptoms, and allergic/ immunologic symptoms.   The pertinent factors have been included in the HPI and below.  Patient Supplied Answers to Review of Systems      2023     2:40 PM    ENT ROS   Ears, Nose, Throat Ringing/noise in ears    Nasal congestion or drainage    Hoarseness   Gastrointestinal/Genitourinary Heartburn/indigestion       Physical Examination   The patient underwent a physical examination as described below. The pertinent positive and negative findings are summarized after the description of the examination.  Constitutional: The patient's developmental and nutritional status was assessed. The  patient's voice quality was assessed.  Head and Face: The head and face were inspected for deformities. The sinuses were palpated. The salivary glands were palpated. Facial muscle strength was assessed bilaterally.  Eyes: Extraocular movements and primary gaze alignment were assessed.  Ears, Nose, Mouth and Throat: The ears and nose were examined for deformities. The nasal septum, mucosa, and turbinates were inspected by anterior rhinoscopy. The lips, teeth, and gums were examined for abnormalities. The oral mucosa, tongue, palate, tonsils, lateral and posterior pharynx were inspected for the presence of asymmetry or mucosal lesions.    Neck: The tracheal position was noted, and the neck mass palpated to determine if there were any asymmetries, abnormal neck masses, thyromegally, or thyroid nodules.  Respiratory: The nature of the breathing and chest expansion/symmetry was observed.  Cardiovascular: The patient was examined to determine the presence of any edema or jugular venous distension.  Abdomen: The contour of the abdomen was noted.  Lymphatic: The patient was examined for infraclavicular lymphadenopathy.  Musculoskeletal: The patient was inspected for the presence of skeletal deformities.  Extremities: The extremities were examined for any clubbing or cyanosis.  Skin: The skin was examined for inflammatory or neoplastic conditions.  Neurologic: The patient's orientation, mood, and affect were noted. The cranial nerve  functions were examined.  Other pertinent positive and negative findings on physical examination:      OC/OP: no lesions, uvula midline, soft palate elevates symmetrically, left BOT cyst   Neck: no lesions, no TH tenderness to palpation  All other physical examination findings were within normal limits and noncontributory.  Procedures     Video Laryngoscopy with Stroboscopy (CPT 87884)    Preoperative Diagnosis:  Dysphonia and throat symptoms  Postoperative Diagnosis: Dysphonia and throat  symptoms  Indication:  Patient has new or persistent dysphonia and throat symptoms.  This requires evaluation by stroboscopy to fully delineate the laryngeal functioning; especially mucosal wave assessment, ultrasharp visualization of lesions on the vocal folds, and overall functioning of the larynx.  Details of Procedure: A 70 degree rigid telescopic laryngoscope or a distal chip flexible scope was used. The lighting was obtained via a light cable connected to a stroboscopic unit. The telescope was inserted either transorally or transnasally until the vocal folds could be visualized. The patient was instructed to sustain the vowel  ee  at a comfortable pitch and loudness as the voice was monitored by a microphone connected to a fundamental frequency tracker. This circuit tracked vocal periodicity, allowing the light to flash in synchrony with the glottal cycles. A setting on the stroboscope was set to change the phase of light strobing with relation to the vocal fundamental frequency, producing an image of 1 to 2 glottal cycles every second. The video images were recorded for analysis. Use of the variable speed, slow and stop scan allowed careful study of pertinent segments of laryngeal function to increase accuracy of clinical assessments of function and dysfunction.  In particular, features of glottal closure, mucosal wave on the vocal fold cover and laryngeal symmetry were analyzed. Lastly, the patient was asked to phonate speech samples and auditory/perceptual evaluation of voice and resonance were performed.  The vocal quality was carefully evaluated for hoarseness, breathiness, loudness, phrase length and intelligibility to determine the source of dysphonia.    Findings:      B. LARYNGOVIDEOSTROBOSCOPY   Anatomic/Physiological Deviations:  LNC, presbylarynx, supraglottic hyperfunction, postcricoid edema  Mucosal wave: Right:  No restriction     Left: No restriction  Bilateral Vocal Fold Vibration:  Symmetric  Vocal Process: Right: No restriction    Left:  No restriction  Vocal Fold closure: Complete glottal closure    Complication(s): None  Disposition: Patient tolerated the procedure well         Fiberoptic Endoscopic Evaluation of Swallowing (CPT 35853)  and Interpretation of Swallowing (CPT 56379)    Indications: See above notes for complete history and physical. Patient complains of dysphagia to both solids and liquids and/or there is suggestion on history and endoscopic exam of the presence of dysphagia causing medical complaints.  Swallowing evaluation is being performed to assess the presence and degree of dysphagia, and to recommend a safe diet.     Pulmonary Status:  No PNA   Current Diet:              regular                                        thin liquids      Consistency Amounts:  Thin Liquid: sip   Puree: sip  Solid: cookies            Positioning: upright in a chair  Oral Peripheral Exam: See physical exam section.  Anatomic Notes: See Videostroboscopy report for assessment of anatomy and laryngeal functioning  Pharyngeal secretions prior to administration of liquid or food: No   Oral Phase Abnormal Findings: No abnormal behavior observed   Pharyngeal Phase Abnormal Findings: no penetration, no aspiration     Recommended Diet:  regular                                        thin liquids            Review of Relevant Clinical Data   I personally reviewed:  Notes: Dr. Emma Moon, 12/5/23  Video Esophagram Review Rounds  Imaging Review of MBSS conducted with attending physician Emma Moon and reviewed/discussed with SLP Adrianna Estrada, Alycia Valles, and/or Roberta Babcock and with GI representative from [facility]     Relevant Background:      Recommendation:  Consider reflux gourmet  Voice therapy     Dr. Ofe Suero, 7/14/23, Gastro[facility]  Assessment/Plan:    Deb Nelosn is a 78 year old female with significant past medical history pertinent for hearing loss, meningioma,  osteopenia who is presenting as a follow-up however as a new patient to myself with chief complaint of GERD/dysphonia/hoarseness/chronic throat clearing/cough.     #GERD w/ Extraesophageal Symptoms  Previously patient was seen by Dr. Mac Soto (5/2021) with concerns of reflux, dyspepsia and heartburn.  At this time she reported that she had recurrence approximately 4 years prior for which she used omeprazole for approximately 1 month with resolution of symptoms.     Today Deb presents with dysphonia, chronic throat clearing/cough and voice hoarseness onset approximately 6 months prior.  Additionally, she is experiencing symptoms of substernal chest discomfort, heartburn and regurgitation.  Symptoms of chest discomfort are occurring 1-2 times monthly which she notes to awake her from sleeps. Currently managed with as needed use of TUMs/Gaviscon.      EGD 6/2021 notable for a healing ulcer within the esophagus at 29 cm from the incisors.  Biopsies with no histopathological abnormality.  No evidence of eosinophils or other inflammatory cells.     To objectively measure the amount of acid exposure patient is agreeable to undergoing EGD with BRAVO study off PPI therapy. Pending results adjustments in acid suppressive therapy will be made as patient wishes to proceed with lifestyle modifications prior to initiation of acid suppressive therapy. Differential for dysphonia/chronic cough and throat clearing includes reflux, irritable larynx syndrome, PND, functional heartburn, esophageal hypersensitivity vs other.      - Consider adding reflux gourmet or Gaviscon with alginate component nightly  - Reflux lifestyle modifications as directed within the AVS   - Upper endoscopy with BRAVO study off acid suppressive therapy   - Consultation with ENT Dr. Moon      #Colorectal Cancer Screening   Colonoscopy 6/17/2020 notable for millimeter ascending colonic polyp consistent with lymphoid aggregate.  Colonoscopy 2015 that was  unremarkable. Family history pertinent mother diagnosed with CRC in 40s.      Dr. Ezekiel Sen, Neurosurgery, 12/16/16  Assessment/Plan     1. Meningioma (CMS/HCC) (Primary)  Assessment & Plan:  Three year follow-up for right suboccipital meningioma without symptoms. MRI looks similar to last study and there has not been much change since 2007.  Imp: asymptomatic meningioma  Plan: R/V with MRI scan in three years with MRI        Radiology: MR Brain w/ and w/o contrast 11/1/22  Impression:     1. Dural based mass along the right occipital convexity likely  represents meningioma.  2. Mild Leukoaraiosis.    Pathology: surgical pathology 10/9/23  Addendum   A.  STOMACH, NODULE, BIOPSY:  - No H. pylori organisms identified (supported by a Helicobacter immunohistochemical stain)      Addendum electronically signed by Pete May DO on 10/11/2023 at 12:59 PM   Final Diagnosis   A.  STOMACH, NODULE, BIOPSY:  - Reactive gastropathic changes, in a background of mild chronic gastritis; see comment  - An immunohistochemical stain to rule out Helicobacter will be performed and reported in an addendum  - No intestinal metaplasia identified     B.  STOMACH, BIOPSY:  - Reactive gastropathy with mild chronic inactive gastritis  - No H. pylori-like organisms identified on routine staining  - No intestinal metaplasia identified       Procedures: pH test 10/13/23  Bravo (wireless pH)   This study was inconclusive for GERD.  The overall % acid exposure of 4.2% and DeMeester overall of 17.9 was inconclusive for ongoing reflux.  There were 30 occurrences of cough and 3 of heartburn symptoms. SI was 6.7 (2/30) and 0 (0/3) respectively overall for acid reflux which indicates a less than complete correlation. The SAP was 0 and 0 respectively indicating a less than complete correlation. Two days were >4.0 AET this making the study inconclusive for GERD that being said, SI and SAP was negative for both symptoms reported. The number of  "reflux events on each day were within normal limits. Interpret within clinical context.     Day 1  4.9% acid exposure, DeMeester Score 23.6   Day 2  4.3% acid exposure, DeMeester Score 13.0   Day 3  3.7% acid exposure, DeMeester Score 11.9   Day 4  4.0% acid exposure, DeMeester Score 12.9     Upper GI endoscopy 10/19/23  Impression:    - Z-line regular, 40 cm from the incisors.                          - Normal esophagus.                          - Gastroesophageal flap valve classified as Hill Grade                          I (prominent fold, tight to endoscope).                          - Erythematous mucosa in the antrum. Biopsied.                          - A single mucosal papule (nodule) found in the                          stomach. Biopsied.                          - Normal duodenal bulb, first portion of the duodenum,                          second portion of the duodenum and examined duodenum.                          - The BRAVO pH capsule was positioned 34 cm from the                          incisors, which was 6 cm proximal to the GE junction.     Labs:  Lab Results   Component Value Date    TSH 2.40 10/03/2022     Lab Results   Component Value Date     07/10/2023    CO2 24 07/10/2023    BUN 16.6 07/10/2023    PHOS 2.7 05/09/2022     Lab Results   Component Value Date    WBC 4.4 10/03/2022    HGB 13.2 10/03/2022    HCT 41.0 10/03/2022    MCV 98 10/03/2022     10/03/2022     No results found for: \"PT\", \"PTT\", \"INR\"  No results found for: \"NATALYA\"  No components found for: \"RHEUMATOIDFACTOR\", \"RF\"  No results found for: \"CRP\"  No components found for: \"CKTOT\", \"URICACID\"  No components found for: \"C3\", \"C4\", \"DSDNAAB\", \"NDNAABIFA\"  No results found for: \"MPOAB\"    Patient reported Quality of Life (QOL) Measures   Patient Supplied Answers To VHI Questionnaire      12/7/2023     2:55 PM   Voice Handicap Index (VHI-10)   My voice makes it difficult for people to hear me 1   People have " "difficulty understanding me in a noisy room 2   My voice difficulties restrict my personal and social life.  0   I feel left out of conversations because of my voice 0   My voice problem causes me to lose income 0   I feel as though I have to strain to produce voice 0   The clarity of my voice is unpredictable 2   My voice problem upsets me 2   My voice makes me feel handicapped 0   People ask, \"What's wrong with your voice?\" 1   VHI-10 8         Patient Supplied Answers To EAT Questionnaire      12/7/2023     2:56 PM   Eating Assessment Tool (EAT-10)   My swallowing problem has caused me to lose weight 0   My swallowing problem interferes with my ability to go out for meals 0   Swallowing liquids takes extra effort 0   Swallowing solids takes extra effort 0   Swallowing pills takes extra effort 0   Swallowing is painful 0   The pleasure of eating is affected by my swallowing 0   When I swallow food sticks in my throat 0   I cough when I eat 0   Swallowing is stressful 0   EAT-10 0         Patient Supplied Answers To CSI Questionnaire      12/7/2023     2:56 PM   Cough Severity Index (CSI)   My cough is worse when I lie down 0   My coughing problem causes me to restrict my personal and social life 0   I tend to avoid places because of my cough problem 0   I feel embarrassed because of my coughing problem 2   People ask, ''What's wrong?'' because I cough a lot 1   I run out of air when I cough 0   My coughing problem affects my voice 0   My coughing problem limits my physical activity 0   My coughing problem upsets me 0   People ask me if I am sick because I cough a lot 0   CSI Score 3         Patient Supplied Answers to Dyspnea Index Questionnaire:       No data to display                Impression & Plan     IMPRESSION: Ms. 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 Chronic throat clearing   - started 1 year ago   - has stayed the same overtime  - former history of cigarettes usage  - no chest xray workup  - allergy triggers and work up: denies  - pulmonary triggers and work up: denies  - GI triggers and work up: working with GI, deMeester 17, poor correlation with cough, EGD normal   - ENT triggers and work up: throat clears with talking and when she lays down  - strobe shows presbylarynx, supraglottic hyperfunction, postcricoid edema  - FEES shows no penetration, no aspiration  - symptoms likely due to irritable larynx syndrome and laryngopharyngeal reflux   - discussed reflux gourmet, trial   Plan  - cough/throat clearing suppression therapy  - reflux gourmet at bedtime and after meals      RETURN VISIT: as needed after voice therapy    Thank you for the kind referral and for allowing me to share in the care of Ms. Nelson. If you have any questions, please do not hesitate to contact me.    Scribe Disclosure:   I, Jessica Degroot, am serving as a scribe; to document services personally performed by Emma Moon MD -based on data collection and the provider's statements to me.     Provider Disclosure:  I agree with above History, Review of Systems, Physical exam and Plan.  I have reviewed the content of the documentation and have edited it as needed. I have personally performed the services documented here and the documentation accurately represents those services and the decisions I have made.        Emma Moon MD    Laryngology    [facility]  Department of  Otolaryngology - Head and Neck Surgery  Clinics & Surgery Center  [address] [place] [address],  [place] [address]  Appointment line: [phone]  Fax: [phone]  [email or web address]

## 2023-12-12 NOTE — PROGRESS NOTES
SPEECH LANGUAGE PATHOLOGY EVALUATION    See electronic medical record for Abuse and Falls Screening details.    Subjective      Pt seen in conjunction with Multidisciplinary ENT clinic at the request of Dr. Moon.     Presenting condition or subjective complaint: hoarseness  Date of onset: 02/01/23    Relevant medical history:   Hearing loss, meningioma, dyspepsia,   Dates & types of surgery: appendectomy 1986 mastoidectomy 82    Prior diagnostic imaging/testing results: Other endoscopy   Prior therapy history for the same diagnosis, illness or injury: No      Living Environment  Social support: With a significant other or spouse   Help at home: Home and Yard maintenance tasks  Equipment owned:       Employment:    Does a lot of public speaking   Hobbies/Interests:      Patient goals for therapy: talk without being needibg to  ler throat    Pain assessment: Pain denied     Objective     SWALLOW EVALUTION  Dysphagia history: Pt denies trouble swallowing. No increased coughing with eating/drinking. She is not modifying her diet at all. She feels swallowing is functional. She denies any increased coughing or throat clearing with po intake.    Current Diet/Method of Nutritional Intake: thin liquids (level 0), regular diet        CLINICAL SWALLOW EVALUATION WITH ENDOSCOPIC VIEW PROVIDED BY ENT    Oral Motor Function: Dentition: adequate dentition  Secretion management: WFL  Mucosal quality: adequate  Mandibular function: intact  Oral labial function: WFL  Lingual function: WFL  Velar function: intact   Buccal function: intact  Laryngeal function: cough, throat clear, volitional swallow, voicing WFL     Level of assist required for feeding: no assistance needed   Textures Trialed:   Clinical Swallow Eval: Thin Liquids  Mode of presentation: straw   Volume presented: 4oz  Preparatory Phase: WFL  Oral Phase: WFL  Pharyngeal phase of swallow: intact   Diagnostic statement: PO trials evaluated under endoscopy completed by MD.  No penetration/aspiration or significant residuals.     Clinical Swallow Eval: Purees  Mode of presentation: spoon   Volume presented: 3 tablespoons  Preparatory Phase: WFL  Oral Phase: WFL  Pharyngeal phase of swallow: intact   Diagnostic statement: PO trials evaluated under endoscopy completed by MD. No penetration/aspiration or significant residuals.     Clinical Swallow Eval: Solids  Mode of presentation: self-fed   Volume presented: 1 Jessylesli friend  Preparatory Phase: WFL  Oral Phase: WFL  Pharyngeal phase of swallow: intact   Diagnostic statement: PO trials evaluated under endoscopy completed by MD. No penetration/aspiration or significant residuals.       ESOPHAGEAL PHASE OF SWALLOW  patient presents with symptoms of esophageal dysphagia     SWALLOW ASSESSMENT CLINICAL IMPRESSIONS AND RATIONALE  Diet Consistency Recommendations: thin liquids (level 0), regular diet    Recommended Feeding/Eating Techniques: slow rate of intake, supraglottic swallow, maintain upright posture during/after eating for 30 minutes   Medication Administration Recommendations: Whole with liquid  Instrumental Assessment Recommendations: no further evaluation indicated for swallowing at this time.     Assessment & Plan   CLINICAL IMPRESSIONS   Medical Diagnosis: hoarseness, chronic cough    Treatment Diagnosis: safe functional oropharyngeal swallow   Impression/Assessment: Pt is a 78 year old female with throat clearing and voice change complaints. The following significant findings have been identified:  safe functional oropharyngeal swallow , characterized by timely swallow, no pharyngeal residue after the completed swallow. No aspiration/penetration noted. Adequate ROM and strength of oral mechanism demonstrated. Pt has hyperfunctional larynx during voicing tasks which contributes to her current symptoms.     PLAN OF CARE  Evaluation only    Recommended Referrals to Other Professionals: none  Education Assessment:   Learner/Method:  Patient;No Barriers to Learning    Risks and benefits of evaluation/treatment have been explained.   Patient/Family/caregiver agrees with Plan of Care.     Evaluation Time:    SLP Eval: oral/pharyngeal swallow function, clinical minutes (56929): 10     Present: Not applicable     Signing Clinician: Adrianna Estrada, SLP

## 2023-12-14 ENCOUNTER — OFFICE VISIT (OUTPATIENT)
Dept: GASTROENTEROLOGY | Facility: CLINIC | Age: 78
End: 2023-12-14
Attending: PHYSICIAN ASSISTANT
Payer: MEDICARE

## 2023-12-14 VITALS
HEIGHT: 67 IN | SYSTOLIC BLOOD PRESSURE: 155 MMHG | BODY MASS INDEX: 24.8 KG/M2 | WEIGHT: 158 LBS | HEART RATE: 71 BPM | DIASTOLIC BLOOD PRESSURE: 109 MMHG | OXYGEN SATURATION: 99 %

## 2023-12-14 DIAGNOSIS — R49.0 HOARSENESS: ICD-10-CM

## 2023-12-14 DIAGNOSIS — R49.0 DYSPHONIA: ICD-10-CM

## 2023-12-14 DIAGNOSIS — K21.00 GASTROESOPHAGEAL REFLUX DISEASE WITH ESOPHAGITIS WITHOUT HEMORRHAGE: ICD-10-CM

## 2023-12-14 DIAGNOSIS — R05.3 CHRONIC COUGH: ICD-10-CM

## 2023-12-14 PROCEDURE — 99213 OFFICE O/P EST LOW 20 MIN: CPT | Performed by: PHYSICIAN ASSISTANT

## 2023-12-14 ASSESSMENT — PAIN SCALES - GENERAL: PAINLEVEL: NO PAIN (0)

## 2023-12-14 NOTE — PATIENT INSTRUCTIONS
It was a pleasure taking care of you today.  I've included a brief summary of our discussion and care plan from today's visit below.  Please review this information with your primary care provider.  _______________________________________________________________________    My recommendations are summarized as follows:    - Continue reflux gourmet after each meal and nightly as directed by ENT  - Reflux lifestyle modifications as directed within the AVS   - Voice therapy      To schedule endoscopic procedures you may call: [phone]  To schedule radiology (imaging) tests you may call: [phone]  To schedule an ENT appointment you may call: [phone]    Please call my nurse Geri ([phone]), Rosalie ([phone]) with any questions or concerns.      Return to GI Clinic in 5-6 months to review your progress.    _______________________________________________________________________    Who do I call with any questions after my visit?  Please be in touch if there are any further questions that arise following today's visit.  There are multiple ways to contact your gastroenterology care team.      During business hours, you may reach a Gastroenterology nurse at [phone] and choose option 3.       To schedule or reschedule an appointment, please call [phone].     You can always send a secure message through [employer].  [employer] messages are answered by your nurse or doctor typically within 24 hours.  Please allow extra time on weekends and holidays.      For urgent/emergent questions after business hours, you may reach the on-call GI Fellow by contacting the [facility] at [phone].     How will I get the results of any tests ordered?    You will receive all of your results.  If you have signed up for MyChart, any tests ordered at your visit will be available to you after your physician reviews them.  Typically this takes 1-2 weeks.  If there are urgent results that require a change  in your care plan, your physician or nurse will call you to discuss the next steps.      What is [employer]?  [employer] is a secure way for you to access all of your healthcare records from the [facility].  It is a web based computer program, so you can sign on to it from any location.  It also allows you to send secure messages to your care team.  I recommend signing up for [employer] access if you have not already done so and are comfortable with using a computer.      How to I schedule a follow-up visit?  If you did not schedule a follow-up visit today, please call [phone] to schedule a follow-up office visit.      If you feel you received exceptional care and are interested in supporting the clinical and research goals of Ofe Suero PA-C or the Division of Gastroenterology, Hepatology, and Nutrition please contact [email or web address] from the [facility] to discuss opportunities to donate.    Sincerely,    Ofe Suero PA-C  Division of Gastroenterology, Hepatology, and Nutrition  [facility]

## 2023-12-14 NOTE — PROGRESS NOTES
Gastroenterology Visit for: Deb Nelson 1945   MRN: [id number]     Reason for Visit:  chief complaint    Referred by: Self  / No address on file  Patient Care Team:  Deb Killian NP as PCP - General (Family Medicine)  Jie Isabel MD PhD as MD (Family Medicine)  Lilia Aguilar RP as Pharmacist (Pharmacist)  Kaylen Bañuelos PA-C as Assigned Surgical Provider  Mikki Dumont APRN CNP as Nurse Practitioner (Cardiovascular Disease)  Mikki Dumont APRN CNP as Assigned Heart and Vascular Provider  Deb Killian NP as Assigned PCP  Ofe Suero PA-C as Physician Assistant (Gastroenterology)  Donna Gandhi RPH as Pharmacist (Pharmacist)  Kaylen Bañuelos PA-C as Physician Assistant (Dermatology)    History of Present Illness:   Deb Nelson is a 78 year old female with significant past medical history pertinent for hearing loss, meningioma, osteopenia who is presenting as a follow-up with chief complaint of GERD/dysphonia/hoarseness/chronic throat clearing/cough.    Interval History December 14, 2023:    Today Deb is seen in office to discuss the results of her recent Bravo study.  She has no acute concerns knowing that her symptoms of throat clearing/cough and hoarseness have been stable.  She is planning on attending voice therapy as she was recently seen with Dr. Moon on Tuesday of this week and recommended this for next course of action.    Will be in [place] until April.  -------------------------------------------------------------------------------------------------------------------------------------------------------------------------------------------------------------------------------  Interval History July 14, 2023:  Previously patient was seen by Dr. Mac Soto (5/2021) with concerns of reflux, dyspepsia and heartburn.  At this time she reported that she had recurrence approximately 4 years prior for which she used omeprazole for  approximately 1 month with resolution of symptoms.    Continues to have occasional reflux occurring at once or twice per month. Notes specifically waking at night with chest discomfort. This is relieved with TUMs.     Associated with throat clearing/coughing and hoarseness of the voice on going for the past 6 months.     Denies weight loss, nausea, emesis, dysphagia, odynophagia, abdominal pain, diarrhea, constipation (< 3 stools per week), nocturnal stooling, incontinence of feces, melena, hematochezia and BRBR.     Mother with history of colon cancer diagnosed at age in last 40s.     No family history or GI related malignancy (esophageal, gastric, pancreatic, liver or colon) or family history of IBD/celiac disease.     No allergy to Nikel.     Worked in [employer] for many years. 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      Esophageal Questionnaire(s)    BEDQ Questionnaire      12/14/2023     9:19 AM   BEDQ Questionnaire: How Often Have You Had the Following?   Trouble eating solid food (meat, bread, vegetables) 0   Trouble eating soft foods (yogurt, jello, pudding) 0   Trouble swallowing liquids 0   Pain while swallowing 0   Coughing or choking while swallowing foods or liquids 0   Total Score: 0         12/14/2023     9:19 AM   BEDQ Questionnaire: Discomfort/Pain Ratings   Eating solid food (meat, bread, vegetables) 0   Eating soft foods (yogurt, jello, pudding) 0   Drinking liquid 0   Total Score: 0       Eckardt Questionnaire      12/14/2023     9:19 AM   Eckardt Questionnaire   Dysphagia 0   Regurgitation 0   Retrosternal Pain 1   Weight Loss (kg) 0   Total Score:  1       Promis 10 Questionnaire      12/14/2023     9:22 AM   PROMIS 10 FLOWSHEET DATA   In general, would you say your health is: 4   In general, would you say your quality of life is: 5   In general, how would you rate your physical health? 4   In general, how would you rate your mental health, including your mood and your  ability to think? 5   In general, how would you rate your satisfaction with your social activities and relationships? 5   In general, please rate how well you carry out your usual social activities and roles. (This includes activities at home, at work and in your community, and responsibilities as a parent, child, spouse, employee, friend, etc.) 5   To what extent are you able to carry out your everyday physical activities such as walking, climbing stairs, carrying groceries, or moving a chair? 5   In the past 7 days, how often have you been bothered by emotional problems such as feeling anxious, depressed, or irritable? 1   In the past 7 days, how would you rate your fatigue on average? 2   In the past 7 days, how would you rate your pain on average, where 0 means no pain, and 10 means worst imaginable pain? 2   Mental health question re-calculation - no clinical value 5   Physical health question re-calculation - no clinical value 4   Pain question re-calculation - no clinical value 4   Global Mental Health Score 20   Global Physical Health Score 17   PROMIS TOTAL - SUBSCORES 37           STUDIES & PROCEDURES:    EGD:     6/17/2021   Impression:          Suggestion of healing ulcer or scar at 29cm from                        incisors- biopsied. Otherwise normal exam with no                        esophagitis or complications of reflux.                        - Scarred (post ulcer) mucosa in the esophagus. Biopsied.                        - Z-line regular, 40 cm from the incisors.                        - Gastroesophageal flap valve classified as Hill Grade I                        (prominent fold, tight to endoscope).                        - Normal stomach.                        - Normal duodenal bulb and second portion of the                        duodenum.                        - The examination was otherwise normal.    FINAL DIAGNOSIS:   A. Esophagus, biopsy:   Squamous esophageal mucosa with no  intraepithelial eosinophils or other   abnormality       Colonoscopy:    6/17/2021                                                                        Findings:        The perianal and digital rectal examinations were normal.        A 1 mm polyp was found in the ascending colon. The polyp was sessile.        The polyp was removed with a jumbo cold forceps. Resection and retrieval        were complete.        Two small-mouthed diverticula were found in the ascending colon.        Retroflexion in the right colon was performed.        The terminal ileum appeared normal.        The exam was otherwise without abnormality on direct and retroflexion        views.     B. Right/Ascending Colon, 1 mm Polyp:   Colonic mucosa with lymphoid aggregate; no neoplastic or hyperplastic   polyp identified       2015 - Colonoscopy without polyps    EndoFLIP directed at the UES or LES (8cm (EF-325) balloon length or 16cm (EF-322) balloon length):   Date:  8cm balloon  Balloon inflation Balloon pressure CSA (mm^2) DI (mm^2/mmHg) Dmin (mm) Compliance   20 (ladmark ID)        30        40        50           16cm balloon  Balloon inflation Balloon pressure CSA (mm^2) DI (mm^2/mmHg) Dmin (mm) Compliance   30 (ladmark ID)        40        50        60        70           High Resolution Manometry:    PH/Impedance:     Bravo:    10/13/2023   Impression    Bravo (wireless pH)  This study was inconclusive for GERD.  The overall % acid exposure of 4.2% and DeMeester overall of 17.9 was inconclusive for ongoing reflux.  There were 30 occurrences of cough and 3 of heartburn symptoms. SI was 6.7 (2/30) and 0 (0/3) respectively overall for acid reflux which indicates a less than complete correlation. The SAP was 0 and 0 respectively indicating a less than complete correlation. Two days were >4.0 AET this making the study inconclusive for GERD that being said, SI and SAP was negative for both symptoms reported. The number of reflux events on each  day were within normal limits. Interpret within clinical context.    Day 1  4.9% acid exposure, DeMeester Score 23.6  Day 2  4.3% acid exposure, DeMeester Score 13.0  Day 3  3.7% acid exposure, DeMeester Score 11.9  Day 4  4.0% acid exposure, DeMeester Score 12.9    This study was interpreted on 11.21.23    Wording of procedure description and interpretation was adapted from [facility] [employer] Weekly Motility Conference (2018). Analysis of the data was self-performed.       CT:    Esophagram:    FL VSS:     GES:    U/S:     XRAY:    Other:       Prior medical records were reviewed including, but not limited to, notes from referring providers, lab work, radiographic tests, and other diagnostic tests. Pertinent results were summarized above.     History     Past Medical History:   Diagnosis Date    Arthritis     Cataract     Glaucoma     Meningioma (H)     Neurosurgeon in [facility]    Osseous obstruction of eustachian tube (aka CHOLESTEOTOMA)        Past Surgical History:   Procedure Laterality Date    APPENDECTOMY  1986    CATARACT IOL, RT/LT      COLONOSCOPY  2015    COLONOSCOPY N/A 6/17/2021    Procedure: COLONOSCOPY, WITH POLYPECTOMY AND BIOPSY;  Surgeon: Mac Soto MD;  Location:  GI    ENT SURGERY  1983    cholesteotoma    ESOPHAGOSCOPY, GASTROSCOPY, DUODENOSCOPY (EGD), COMBINED N/A 6/17/2021    Procedure: ESOPHAGOGASTRODUODENOSCOPY, WITH BIOPSY;  Surgeon: Mac Soto MD;  Location: [facility] EXCISION OF CHOLESTEATOMA, MIDDLE EAR  1982       Social History     Socioeconomic History    Marital status:      Spouse name: Not on file    Number of children: Not on file    Years of education: Not on file    Highest education level: Not on file   Occupational History    Not on file   Tobacco Use    Smoking status: Former     Packs/day: 2.00     Years: 25.00     Additional pack years: 0.00     Total pack years: 50.00     Types: Cigarettes     Start date: 1/1/1960      Quit date: 1985     Years since quittin.4    Smokeless tobacco: Never   Vaping Use    Vaping Use: Never used   Substance and Sexual Activity    Alcohol use: Yes     Alcohol/week: 1.7 standard drinks of alcohol     Comment: 7 per week    Drug use: No    Sexual activity: Yes     Partners: Female     Birth control/protection: Post-menopausal   Other Topics Concern    Parent/sibling w/ CABG, MI or angioplasty before 65F 55M? Not Asked   Social History Narrative    Moved to [place] from [place] to be near partner    Retired professor of Nursing at [employer]     Social Determinants of Health     Financial Resource Strain: Not on file   Food Insecurity: Not on file   Transportation Needs: Not on file   Physical Activity: Not on file   Stress: Not on file   Social Connections: Not on file   Interpersonal Safety: Not on file   Housing Stability: Not on file       Family History   Problem Relation Age of Onset    Colon Cancer Mother     Breast Cancer Mother     Uterine Cancer Mother     Osteoporosis Mother     Dementia Mother     Melanoma Sister     Cerebrovascular Disease Sister 80    Osteoporosis Sister     Coronary Artery Disease Sister 80        EF 50%, s/p ICD    Chronic Obstructive Pulmonary Disease Sister         smoker    Thyroid Disease Sister         not sure which type    Coronary Artery Disease Sister 75        s/p PCI/Federico    Osteoporosis Sister     Cerebrovascular Disease Sister 80    Coronary Artery Disease Sister 80        s/p PCI/DESEAN    Dementia Brother 82    Coronary Artery Disease Brother 80        s/p MI, s/p PCI/DESEAN, s/p ICD    Hypertension Brother     Mental Illness Brother         Schizophrenia/PTSD post [place],  from [facility]    Psychotic Disorder Other         mother, depression; father schizophrenia    Skin Cancer No family hx of      Family history reviewed and edited as appropriate    Medications and Allergies:     Outpatient Encounter Medications as of  "12/14/2023   Medication Sig Dispense Refill    Acetaminophen (TYLENOL PO) Take 325 mg by mouth PRN      calcium carbonate 500 mg (elemental) (OSCAL 500) 1250 (500 Ca) MG TABS tablet Take by mouth daily      mvw complete formulation (CHEWABLES) tablet Take 1 tablet by mouth daily      ofloxacin (FLOXIN) 0.3 % otic solution       zoledronic acid (ZOMETA) 4 MG/100ML SOLN Inject 4 mg into the vein once       No facility-administered encounter medications on file as of 12/14/2023.        Allergies   Allergen Reactions    Clindamycin Itching and Rash        Review of systems:  A full 10 point review of systems was obtained and was negative except for the pertinent positives and negatives stated within the HPI.    Objective Findings:   Physical Exam:    Constitutional: BP (!) 155/109   Pulse 71   Ht 1.689 m (5' 6.5\")   Wt 71.7 kg (158 lb)   SpO2 99%   BMI 25.12 kg/m    General: Alert, cooperative, no distress, well-appearing. Throat clearing/cough throughout visit  Head: Atraumatic, normocephalic, no obvious abnormalities   Eyes: Sclera anicteric, no obvious conjunctival hemorrhage   Nose: Nares normal, no obvious malformation, no obvious rhinorrhea   Respiratory: Resting comfortably, no apparent distress  Gastrointestinal: Flat, non-distended  Skin: No jaundice, no obvious rash  Neurologic: AAOx3, no obvious neurologic abnormality  Psychiatric: Normal Affect, appropriate mood  Extremities: No obvious edema, no obvious malformation     Labs, Radiology, Pathology     Lab Results   Component Value Date    WBC 4.4 10/03/2022    WBC 4.2 03/17/2021    WBC 6.7 08/18/2014    HGB 13.2 10/03/2022    HGB 13.8 03/17/2021    HGB 13.0 08/18/2014     10/03/2022     03/17/2021     08/18/2014    CHOL 206 (H) 07/10/2023    CHOL 237 (H) 12/22/2022    CHOL 219 (H) 03/17/2021    TRIG 99 07/10/2023    TRIG 104 12/22/2022    TRIG 74 03/17/2021    HDL 93 07/10/2023     12/22/2022     03/17/2021    ALT 15 " 10/03/2022    ALT 25 03/17/2021    ALT 21 08/18/2014    AST 27 10/03/2022    AST 22 03/17/2021    AST 21 08/18/2014     07/10/2023     10/03/2022     05/09/2022    BUN 16.6 07/10/2023    BUN 15.1 10/03/2022    BUN 21 05/09/2022    CO2 24 07/10/2023    CO2 28 10/03/2022    CO2 25 05/09/2022    TSH 2.40 10/03/2022    TSH 2.44 05/10/2021        Liver Function Studies -   Recent Labs   Lab Test 10/03/22  1003   PROTTOTAL 7.3   ALBUMIN 4.6   BILITOTAL 0.4   ALKPHOS 41   AST 27   ALT 15          Patient Active Problem List    Diagnosis Date Noted    Senile osteoporosis 05/11/2021     Priority: Medium    Family history of malignant neoplasm of gastrointestinal tract 05/10/2021     Priority: Medium    Brow ptosis 12/17/2020     Priority: Medium    Nonexudative age-related macular degeneration, bilateral, early dry stage 12/17/2020     Priority: Medium    Pseudophakia, both eyes 12/17/2020     Priority: Medium     Formatting of this note might be different from the original.  Left eye 12/2015  Right eye 2019      Mixed conductive and sensorineural hearing loss of right ear with restricted hearing of left ear 12/22/2017     Priority: Medium    AK (actinic keratosis) 10/20/2017     Priority: Medium    Meningioma (H) 12/16/2016     Priority: Medium     Last Assessment & Plan:   Formatting of this note might be different from the original.  Three year follow-up for right suboccipital meningioma without symptoms. MRI looks similar to last study and there has not been much change since 2007.  Imp: asymptomatic meningioma  Plan: R/V with MRI scan in three years with MRI      H/O senile osteopenia 11/02/2016     Priority: Medium     Had Dexa many year ago, last Dexa 2014. Stated okay already. Tool Fosamax for 5 years in the past.       Dyspepsia 11/02/2016     Priority: Medium    History of cataract 11/02/2016     Priority: Medium    Generalized osteoarthritis of multiple sites 11/02/2016     Priority: Medium     History of meningioma of the brain 11/02/2016     Priority: Medium    Recurrent cholesteatoma of postmastoidectomy cavity, unspecified laterality 11/02/2016     Priority: Medium    Fx wrist 07/01/2016     Priority: Medium    Degenerative, intervertebral disc, cervical 10/15/2012     Priority: Medium    Right shoulder pain 10/15/2012     Priority: Medium    Anatomical narrow angle borderline glaucoma 12/20/2010     Priority: Medium     Formatting of this note might be different from the original.  anatomical narrow angles OU (/2010 OUPI's done )      PVD (posterior vitreous detachment) 12/20/2010     Priority: Medium     Formatting of this note might be different from the original.  PVD (posterior vitreous detachment) OU      Family history of musculoskeletal disease 06/28/2004     Priority: Medium      Assessment and Plan   Assessment/Plan:    Deb Nelson is a 78 year old female with significant past medical history pertinent for hearing loss, meningioma, osteopenia who is presenting as a follow-up with chief complaint of GERD/dysphonia/hoarseness/chronic throat clearing/cough.    #GERD w/ Extraesophageal Symptoms  Previously patient was seen by Dr. Mac Soto (5/2021) with concerns of reflux, dyspepsia and heartburn.  At this time she reported that she had recurrence approximately 4 years prior for which she used omeprazole for approximately 1 month with resolution of symptoms.    EGD 6/2021 notable for a healing ulcer within the esophagus at 29 cm from the incisors.  Biopsies with no histopathological abnormality.  No evidence of eosinophils or other inflammatory cells.    At our first meeting 7/2023 Deb presented with dysphonia, chronic throat clearing/cough and voice hoarseness onset approximately 6 months prior.  Additionally, she was experiencing symptoms of substernal chest discomfort, heartburn and regurgitation occurring 1-2 times monthly which she notes to awake her from sleep for which she uses  TUMs/Reflux Gourmet.  Today she reports that symptoms are overall stable.      10/13/2023 BRAVO off acid suppressive therapy with overall acid exposure time of 4.2.  There were 30 occurrences of cough and 3 of heartburn for which both had a negative SI/SAP.  This is inconclusive for pathological acid exposure however with negative SI/SAP it is unlikely that symptoms are secondary to reflux.    She was recently seen by ENT Tuesday 12/12/2023 of this week.  Flexible laryngoscopy was performed and notable for presbylarynx, supraglottic hyperfunction and postcricoid edema. FEES without residue, penetration or aspiration.  Recommendations were for continued use of reflux Gourmet and voice therapy.    If no improvement could then consider initiation of low-dose acid suppressive therapy either PPI or H2 blocker.    - Continue reflux gourmet after each meal and nightly as directed by ENT  - Reflux lifestyle modifications as directed within the AVS   - Voice therapy    #Colorectal Cancer Screening   Colonoscopy 6/17/2021 notable for millimeter ascending colonic polyp consistent with lymphoid aggregate.  Colonoscopy 2015 that was unremarkable. Family history pertinent mother diagnosed with CRC in 40s.         Follow up plan:   Return to clinic 5-6 months and as needed.    The risks and benefits of my recommendations, as well as other treatment options were discussed with the patient and any available family today. All questions were answered.     Follow up: As planned above. Today, I personally spent 14 minutes in direct face to face time with the patient, of which greater than 50% of the time was spent in patient education and counseling as described above. Approximately 10 minutes were spent on indirect care associated with the patient's consultation including but not limited to review of: patient medical records to date, clinic visits, hospital records, lab results, imaging studies, procedural documentation, and  coordinating care with other providers. The findings from this review are summarized in the above note. All of the above accounted for a cumulative time of 24 minutes and was performed on the date of service.     The patient verbalized understanding of the plan and was appreciative for the time spent and information provided during the office visit.           Ofe Suero PA-C  Division of Gastroenterology, Hepatology, and Nutrition  [facility]       Documentation assisted by voice recognition and documentation system.

## 2023-12-14 NOTE — LETTER
12/14/2023         RE: Deb Nelson  [address]  [facility] [place] [address]        Dear Colleague,    Thank you for referring your patient, Deb Nelson, to the [facility] [place]. Please see a copy of my visit note below.    Gastroenterology Visit for: Deb Nelson 1945   MRN: [id number]     Reason for Visit:  chief complaint    Referred by: Self  / No address on file  Patient Care Team:  Deb Killian NP as PCP - General (Family Medicine)  Jie Isabel MD PhD as MD (Family Medicine)  Lilia Aguilar RP as Pharmacist (Pharmacist)  Kaylen Bañuelos PA-C as Assigned Surgical Provider  Mikki Dumont APRN CNP as Nurse Practitioner (Cardiovascular Disease)  Mikki Dumont APRN CNP as Assigned Heart and Vascular Provider  Deb Killian NP as Assigned PCP  Ofe Suero PA-C as Physician Assistant (Gastroenterology)  Donna Gandhi RP as Pharmacist (Pharmacist)  Kaylen Bañuelos PA-C as Physician Assistant (Dermatology)    History of Present Illness:   Deb Nelson is a 78 year old female with significant past medical history pertinent for hearing loss, meningioma, osteopenia who is presenting as a follow-up with chief complaint of GERD/dysphonia/hoarseness/chronic throat clearing/cough.    Interval History December 14, 2023:    Today Deb is seen in office to discuss the results of her recent Bravo study.  She has no acute concerns knowing that her symptoms of throat clearing/cough and hoarseness have been stable.  She is planning on attending voice therapy as she was recently seen with Dr. Moon on Tuesday of this week and recommended this for next course of action.    Will be in [place] until  April.  -------------------------------------------------------------------------------------------------------------------------------------------------------------------------------------------------------------------------------  Interval History July 14, 2023:  Previously patient was seen by Dr. Mac Soto (5/2021) with concerns of reflux, dyspepsia and heartburn.  At this time she reported that she had recurrence approximately 4 years prior for which she used omeprazole for approximately 1 month with resolution of symptoms.    Continues to have occasional reflux occurring at once or twice per month. Notes specifically waking at night with chest discomfort. This is relieved with TUMs.     Associated with throat clearing/coughing and hoarseness of the voice on going for the past 6 months.     Denies weight loss, nausea, emesis, dysphagia, odynophagia, abdominal pain, diarrhea, constipation (< 3 stools per week), nocturnal stooling, incontinence of feces, melena, hematochezia and BRBR.     Mother with history of colon cancer diagnosed at age in last 40s.     No family history or GI related malignancy (esophageal, gastric, pancreatic, liver or colon) or family history of IBD/celiac disease.     No allergy to Nikel.     Worked in [employer] for many years. 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      Esophageal Questionnaire(s)    BEDQ Questionnaire      12/14/2023     9:19 AM   BEDQ Questionnaire: How Often Have You Had the Following?   Trouble eating solid food (meat, bread, vegetables) 0   Trouble eating soft foods (yogurt, jello, pudding) 0   Trouble swallowing liquids 0   Pain while swallowing 0   Coughing or choking while swallowing foods or liquids 0   Total Score: 0         12/14/2023     9:19 AM   BEDQ Questionnaire: Discomfort/Pain Ratings   Eating solid food (meat, bread, vegetables) 0   Eating soft foods (yogurt, jello, pudding) 0   Drinking liquid 0   Total Score: 0       Red  Questionnaire      12/14/2023     9:19 AM   Eckardt Questionnaire   Dysphagia 0   Regurgitation 0   Retrosternal Pain 1   Weight Loss (kg) 0   Total Score:  1       Promis 10 Questionnaire      12/14/2023     9:22 AM   PROMIS 10 FLOWSHEET DATA   In general, would you say your health is: 4   In general, would you say your quality of life is: 5   In general, how would you rate your physical health? 4   In general, how would you rate your mental health, including your mood and your ability to think? 5   In general, how would you rate your satisfaction with your social activities and relationships? 5   In general, please rate how well you carry out your usual social activities and roles. (This includes activities at home, at work and in your community, and responsibilities as a parent, child, spouse, employee, friend, etc.) 5   To what extent are you able to carry out your everyday physical activities such as walking, climbing stairs, carrying groceries, or moving a chair? 5   In the past 7 days, how often have you been bothered by emotional problems such as feeling anxious, depressed, or irritable? 1   In the past 7 days, how would you rate your fatigue on average? 2   In the past 7 days, how would you rate your pain on average, where 0 means no pain, and 10 means worst imaginable pain? 2   Mental health question re-calculation - no clinical value 5   Physical health question re-calculation - no clinical value 4   Pain question re-calculation - no clinical value 4   Global Mental Health Score 20   Global Physical Health Score 17   PROMIS TOTAL - SUBSCORES 37           STUDIES & PROCEDURES:    EGD:     6/17/2021   Impression:          Suggestion of healing ulcer or scar at 29cm from                        incisors- biopsied. Otherwise normal exam with no                        esophagitis or complications of reflux.                        - Scarred (post ulcer) mucosa in the esophagus. Biopsied.                        -  Z-line regular, 40 cm from the incisors.                        - Gastroesophageal flap valve classified as Hill Grade I                        (prominent fold, tight to endoscope).                        - Normal stomach.                        - Normal duodenal bulb and second portion of the                        duodenum.                        - The examination was otherwise normal.    FINAL DIAGNOSIS:   A. Esophagus, biopsy:   Squamous esophageal mucosa with no intraepithelial eosinophils or other   abnormality       Colonoscopy:    6/17/2021                                                                        Findings:        The perianal and digital rectal examinations were normal.        A 1 mm polyp was found in the ascending colon. The polyp was sessile.        The polyp was removed with a jumbo cold forceps. Resection and retrieval        were complete.        Two small-mouthed diverticula were found in the ascending colon.        Retroflexion in the right colon was performed.        The terminal ileum appeared normal.        The exam was otherwise without abnormality on direct and retroflexion        views.     B. Right/Ascending Colon, 1 mm Polyp:   Colonic mucosa with lymphoid aggregate; no neoplastic or hyperplastic   polyp identified       2015 - Colonoscopy without polyps    EndoFLIP directed at the UES or LES (8cm (EF-325) balloon length or 16cm (EF-322) balloon length):   Date:  8cm balloon  Balloon inflation Balloon pressure CSA (mm^2) DI (mm^2/mmHg) Dmin (mm) Compliance   20 (ladmark ID)        30        40        50           16cm balloon  Balloon inflation Balloon pressure CSA (mm^2) DI (mm^2/mmHg) Dmin (mm) Compliance   30 (ladmark ID)        40        50        60        70           High Resolution Manometry:    PH/Impedance:     Bravo:    10/13/2023   Impression    Bravo (wireless pH)  This study was inconclusive for GERD.  The overall % acid exposure of 4.2% and DeMeester overall of  17.9 was inconclusive for ongoing reflux.  There were 30 occurrences of cough and 3 of heartburn symptoms. SI was 6.7 (2/30) and 0 (0/3) respectively overall for acid reflux which indicates a less than complete correlation. The SAP was 0 and 0 respectively indicating a less than complete correlation. Two days were >4.0 AET this making the study inconclusive for GERD that being said, SI and SAP was negative for both symptoms reported. The number of reflux events on each day were within normal limits. Interpret within clinical context.    Day 1  4.9% acid exposure, DeMeester Score 23.6  Day 2  4.3% acid exposure, DeMeester Score 13.0  Day 3  3.7% acid exposure, DeMeester Score 11.9  Day 4  4.0% acid exposure, DeMeester Score 12.9    This study was interpreted on 11.21.23    Wording of procedure description and interpretation was adapted from [employer] [facility] [employer] Weekly Motility Conference (2018). Analysis of the data was self-performed.       CT:    Esophagram:    FL VSS:     GES:    U/S:     XRAY:    Other:       Prior medical records were reviewed including, but not limited to, notes from referring providers, lab work, radiographic tests, and other diagnostic tests. Pertinent results were summarized above.     History     Past Medical History:   Diagnosis Date    Arthritis     Cataract     Glaucoma     Meningioma (H)     Neurosurgeon in [facility]    Osseous obstruction of eustachian tube (aka CHOLESTEOTOMA)        Past Surgical History:   Procedure Laterality Date    APPENDECTOMY  1986    CATARACT IOL, RT/LT      COLONOSCOPY  2015    COLONOSCOPY N/A 6/17/2021    Procedure: COLONOSCOPY, WITH POLYPECTOMY AND BIOPSY;  Surgeon: Mac Soto MD;  Location:  GI    ENT SURGERY  1983    cholesteotoma    ESOPHAGOSCOPY, GASTROSCOPY, DUODENOSCOPY (EGD), COMBINED N/A 6/17/2021    Procedure: ESOPHAGOGASTRODUODENOSCOPY, WITH BIOPSY;  Surgeon: Mac Soto MD;  Location:  GI    HC EXCISION  OF CHOLESTEATOMA, MIDDLE EAR         Social History     Socioeconomic History    Marital status:      Spouse name: Not on file    Number of children: Not on file    Years of education: Not on file    Highest education level: Not on file   Occupational History    Not on file   Tobacco Use    Smoking status: Former     Packs/day: 2.00     Years: 25.00     Additional pack years: 0.00     Total pack years: 50.00     Types: Cigarettes     Start date: 1960     Quit date: 1985     Years since quittin.4    Smokeless tobacco: Never   Vaping Use    Vaping Use: Never used   Substance and Sexual Activity    Alcohol use: Yes     Alcohol/week: 1.7 standard drinks of alcohol     Comment: 7 per week    Drug use: No    Sexual activity: Yes     Partners: Female     Birth control/protection: Post-menopausal   Other Topics Concern    Parent/sibling w/ CABG, MI or angioplasty before 65F 55M? 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 [place]     Social Determinants of Health     Financial Resource Strain: Not on file   Food Insecurity: Not on file   Transportation Needs: Not on file   Physical Activity: Not on file   Stress: Not on file   Social Connections: Not on file   Interpersonal Safety: Not on file   Housing Stability: Not on file       Family History   Problem Relation Age of Onset    Colon Cancer Mother     Breast Cancer Mother     Uterine Cancer Mother     Osteoporosis Mother     Dementia Mother     Melanoma Sister     Cerebrovascular Disease Sister 80    Osteoporosis Sister     Coronary Artery Disease Sister 80        EF 50%, s/p ICD    Chronic Obstructive Pulmonary Disease Sister         smoker    Thyroid Disease Sister         not sure which type    Coronary Artery Disease Sister 75        s/p PCI/Federico    Osteoporosis Sister     Cerebrovascular Disease Sister 80    Coronary Artery Disease Sister 80        " s/p PCI/DESEAN    Dementia Brother 82    Coronary Artery Disease Brother 80        s/p MI, s/p PCI/DESEAN, s/p ICD    Hypertension Brother     Mental Illness Brother         Schizophrenia/PTSD post [place],  from [facility]    Psychotic Disorder Other         mother, depression; father schizophrenia    Skin Cancer No family hx of      Family history reviewed and edited as appropriate    Medications and Allergies:     Outpatient Encounter Medications as of 2023   Medication Sig Dispense Refill    Acetaminophen (TYLENOL PO) Take 325 mg by mouth PRN      calcium carbonate 500 mg (elemental) (OSCAL 500) 1250 (500 Ca) MG TABS tablet Take by mouth daily      mvw complete formulation (CHEWABLES) tablet Take 1 tablet by mouth daily      ofloxacin (FLOXIN) 0.3 % otic solution       zoledronic acid (ZOMETA) 4 MG/100ML SOLN Inject 4 mg into the vein once       No facility-administered encounter medications on file as of 2023.        Allergies   Allergen Reactions    Clindamycin Itching and Rash        Review of systems:  A full 10 point review of systems was obtained and was negative except for the pertinent positives and negatives stated within the HPI.    Objective Findings:   Physical Exam:    Constitutional: BP (!) 155/109   Pulse 71   Ht 1.689 m (5' 6.5\")   Wt 71.7 kg (158 lb)   SpO2 99%   BMI 25.12 kg/m    General: Alert, cooperative, no distress, well-appearing. Throat clearing/cough throughout visit  Head: Atraumatic, normocephalic, no obvious abnormalities   Eyes: Sclera anicteric, no obvious conjunctival hemorrhage   Nose: Nares normal, no obvious malformation, no obvious rhinorrhea   Respiratory: Resting comfortably, no apparent distress  Gastrointestinal: Flat, non-distended  Skin: No jaundice, no obvious rash  Neurologic: AAOx3, no obvious neurologic abnormality  Psychiatric: Normal Affect, appropriate mood  Extremities: No obvious edema, no obvious malformation     Labs, Radiology, " Pathology     Lab Results   Component Value Date    WBC 4.4 10/03/2022    WBC 4.2 03/17/2021    WBC 6.7 08/18/2014    HGB 13.2 10/03/2022    HGB 13.8 03/17/2021    HGB 13.0 08/18/2014     10/03/2022     03/17/2021     08/18/2014    CHOL 206 (H) 07/10/2023    CHOL 237 (H) 12/22/2022    CHOL 219 (H) 03/17/2021    TRIG 99 07/10/2023    TRIG 104 12/22/2022    TRIG 74 03/17/2021    HDL 93 07/10/2023     12/22/2022     03/17/2021    ALT 15 10/03/2022    ALT 25 03/17/2021    ALT 21 08/18/2014    AST 27 10/03/2022    AST 22 03/17/2021    AST 21 08/18/2014     07/10/2023     10/03/2022     05/09/2022    BUN 16.6 07/10/2023    BUN 15.1 10/03/2022    BUN 21 05/09/2022    CO2 24 07/10/2023    CO2 28 10/03/2022    CO2 25 05/09/2022    TSH 2.40 10/03/2022    TSH 2.44 05/10/2021        Liver Function Studies -   Recent Labs   Lab Test 10/03/22  1003   PROTTOTAL 7.3   ALBUMIN 4.6   BILITOTAL 0.4   ALKPHOS 41   AST 27   ALT 15          Patient Active Problem List    Diagnosis Date Noted    Senile osteoporosis 05/11/2021     Priority: Medium    Family history of malignant neoplasm of gastrointestinal tract 05/10/2021     Priority: Medium    Brow ptosis 12/17/2020     Priority: Medium    Nonexudative age-related macular degeneration, bilateral, early dry stage 12/17/2020     Priority: Medium    Pseudophakia, both eyes 12/17/2020     Priority: Medium     Formatting of this note might be different from the original.  Left eye 12/2015  Right eye 2019      Mixed conductive and sensorineural hearing loss of right ear with restricted hearing of left ear 12/22/2017     Priority: Medium    AK (actinic keratosis) 10/20/2017     Priority: Medium    Meningioma (H) 12/16/2016     Priority: Medium     Last Assessment & Plan:   Formatting of this note might be different from the original.  Three year follow-up for right suboccipital meningioma without symptoms. MRI looks similar to last study and  there has not been much change since 2007.  Imp: asymptomatic meningioma  Plan: R/V with MRI scan in three years with MRI      H/O senile osteopenia 11/02/2016     Priority: Medium     Had Dexa many year ago, last Dexa 2014. Stated okay already. Tool Fosamax for 5 years in the past.       Dyspepsia 11/02/2016     Priority: Medium    History of cataract 11/02/2016     Priority: Medium    Generalized osteoarthritis of multiple sites 11/02/2016     Priority: Medium    History of meningioma of the brain 11/02/2016     Priority: Medium    Recurrent cholesteatoma of postmastoidectomy cavity, unspecified laterality 11/02/2016     Priority: Medium    Fx wrist 07/01/2016     Priority: Medium    Degenerative, intervertebral disc, cervical 10/15/2012     Priority: Medium    Right shoulder pain 10/15/2012     Priority: Medium    Anatomical narrow angle borderline glaucoma 12/20/2010     Priority: Medium     Formatting of this note might be different from the original.  anatomical narrow angles OU (/2010 OUPI's done )      PVD (posterior vitreous detachment) 12/20/2010     Priority: Medium     Formatting of this note might be different from the original.  PVD (posterior vitreous detachment) OU      Family history of musculoskeletal disease 06/28/2004     Priority: Medium      Assessment and Plan   Assessment/Plan:    Deb Nelson is a 78 year old female with significant past medical history pertinent for hearing loss, meningioma, osteopenia who is presenting as a follow-up with chief complaint of GERD/dysphonia/hoarseness/chronic throat clearing/cough.    #GERD w/ Extraesophageal Symptoms  Previously patient was seen by Dr. Mac Soto (5/2021) with concerns of reflux, dyspepsia and heartburn.  At this time she reported that she had recurrence approximately 4 years prior for which she used omeprazole for approximately 1 month with resolution of symptoms.    EGD 6/2021 notable for a healing ulcer within the esophagus at  29 cm from the incisors.  Biopsies with no histopathological abnormality.  No evidence of eosinophils or other inflammatory cells.    At our first meeting 7/2023 Deb presented with dysphonia, chronic throat clearing/cough and voice hoarseness onset approximately 6 months prior.  Additionally, she was experiencing symptoms of substernal chest discomfort, heartburn and regurgitation occurring 1-2 times monthly which she notes to awake her from sleep for which she uses TUMs/Reflux Gourmet.  Today she reports that symptoms are overall stable.      10/13/2023 BRAVO off acid suppressive therapy with overall acid exposure time of 4.2.  There were 30 occurrences of cough and 3 of heartburn for which both had a negative SI/SAP.  This is inconclusive for pathological acid exposure however with negative SI/SAP it is unlikely that symptoms are secondary to reflux.    She was recently seen by ENT Tuesday 12/12/2023 of this week.  Flexible laryngoscopy was performed and notable for presbylarynx, supraglottic hyperfunction and postcricoid edema. FEES without residue, penetration or aspiration.  Recommendations were for continued use of reflux Gourmet and voice therapy.    If no improvement could then consider initiation of low-dose acid suppressive therapy either PPI or H2 blocker.    - Continue reflux gourmet after each meal and nightly as directed by ENT  - Reflux lifestyle modifications as directed within the AVS   - Voice therapy    #Colorectal Cancer Screening   Colonoscopy 6/17/2021 notable for millimeter ascending colonic polyp consistent with lymphoid aggregate.  Colonoscopy 2015 that was unremarkable. Family history pertinent mother diagnosed with CRC in 40s.         Follow up plan:   Return to clinic 5-6 months and as needed.    The risks and benefits of my recommendations, as well as other treatment options were discussed with the patient and any available family today. All questions were answered.     Follow up: As  planned above. Today, I personally spent 14 minutes in direct face to face time with the patient, of which greater than 50% of the time was spent in patient education and counseling as described above. Approximately 10 minutes were spent on indirect care associated with the patient's consultation including but not limited to review of: patient medical records to date, clinic visits, hospital records, lab results, imaging studies, procedural documentation, and coordinating care with other providers. The findings from this review are summarized in the above note. All of the above accounted for a cumulative time of 24 minutes and was performed on the date of service.     The patient verbalized understanding of the plan and was appreciative for the time spent and information provided during the office visit.     Documentation assisted by voice recognition and documentation system.            Again, thank you for allowing me to participate in the care of your patient.      Sincerely,    Ofe Suero PA-C

## 2023-12-14 NOTE — NURSING NOTE
"Chief Complaint   Patient presents with    Follow Up       Vitals:    12/14/23 0921   BP: (!) 155/109   Pulse: 71   SpO2: 99%   Weight: 71.7 kg (158 lb)   Height: 1.689 m (5' 6.5\")       Body mass index is 25.12 kg/m .    Blood pressure elevated. Provider notified. Recheck offered.     Emily Landon    "

## 2023-12-18 ENCOUNTER — VIRTUAL VISIT (OUTPATIENT)
Dept: OTOLARYNGOLOGY | Facility: CLINIC | Age: 78
End: 2023-12-18
Payer: MEDICARE

## 2023-12-18 DIAGNOSIS — R09.89 CHRONIC THROAT CLEARING: ICD-10-CM

## 2023-12-18 DIAGNOSIS — R49.0 DYSPHONIA: Primary | ICD-10-CM

## 2023-12-18 PROCEDURE — 92507 TX SP LANG VOICE COMM INDIV: CPT | Mod: GN | Performed by: SPEECH-LANGUAGE PATHOLOGIST

## 2023-12-18 NOTE — PATIENT INSTRUCTIONS
"  After Visit Summary    Patient: Deb Nelson  Date of Visit: 12/18/2023    Throat-clear:  Keep using the techniques and strategies to prevent or suppress your throat-clear, or at least make it more gentle    Voice exercises  Straw phonation with bubbles  Start with a smoothie straw in 1.5 - 2\" water; 2x: blow 5-8 seconds with no voice and keep bubbles consistent.  3x: blow bubbles and add your voice  Sense your voice in the straw, or in your mouth/face, not in your throat  3x: blow bubbles and vary the pitch gliding up and down             Up and down like a siren  You can also use that puffed upper lip configuration that you practiced; continue to feel your voice in your face  Go up and down for 5-6 seconds; that's one repetition  5-10 repetitions, 5-10 times per day      "

## 2023-12-18 NOTE — PROGRESS NOTES
Deb Nelson is a 78 year old female who is being evaluated via a billable video visit.      Deb has been notified and verbally consented to the following:     This video visit will be conducted between you and your provider.    Patient has opted to conduct today's video visit vs an in-person appointment.     Video visits are billed at different rates depending on your insurance coverage. Please reach out to your insurance provider with any questions.     If during the course of the call the provider feels the appointment is not appropriate, you will not be charged for this service.  Provider has received verbal consent for billable virtual visit from the patient? Yes  Will anyone else be joining your video visit? No    Call initiated at: 10:30 AM Technical problems for 10 minutes   Type of Visit Platform Used: [employer] Video  Location of provider: Home  Location of patient: [facility]  Karthikeyan Johnson Jr., M.D., F.A.C.S.  Gaby Esparza M.D., M.P.H.  Emma Moon M.D.  Tg Mayer M.M. (voice), M.A., CCC-SLP  Anum Leong M.S., CCC-SLP  Maday Goldsmith, Ph.D., CCC-SLP  Jonathon Stokes, Ph.D., CCC-SLP  Fifi Jewell M.S., CCC-SLP  Jair Villarreal M.M., M.A., CCC-SLP  PRECIOUS Dyson (voice), M.S., CCC-SLP    [facility]  VOICE/SPEECH/BREATHING THERAPY PROGRESS REPORT    Patient: Deb Nelson  Date of Service: 12/18/2023    Date of Last Service: 12/12/23  Referring physician: Dr. Moon  Initial evaluation: 12/12/23  Therapy Session #1     I had the pleasure of seeing Ms. Nelson today, for speech therapy to address a diagnosis of:  Chronic Cough (R05.3)   Dysphonia (R49.0)  Irritable Larynx Syndrome (ILS) (J38.7).    PROGRESS SINCE LAST SESSION  Dr. Terrazasamericafortunato was seen for evaluation on 12/12/23.  At that time, it was determined that she would benefit from a course of speech therapy to address the above diagnosis.  Since then, she states she has not had any change  in her symptoms, which is expected, as no therapeutic suggestions were made at the time.    Dr. Nelson also states that:    Her problem is more of a throat-clear than a cough    The voice problem seems to be a reaction to the dryness and throat-clearing  o Her voice gets bad within 10 minutes of giving a presentation     Dr. Nelson presents today with the following:  Voice quality:    WNL, essentially clear    Narrow resonance    Pitch sounds appropriate  Cough/ Throat clear:    Frequent gentle dry throat clears early in the session; these resolved as I taught her strategies for suppression    THERAPEUTIC ACTIVITIES  Today Dr. Nelson participated in the following therapeutic activities:    Nome concepts and techniques for improving topical hydration     I provided instruction for techniques and strategies to reduce the chronic cough/throat clearing  o alternative behaviors such as voiceless glottic coup, humming, swallowing, etc. were taught  o strategies for reducing mucosal irritation were taught  o I provided a brochure by email (patient requested) and we went through it together    Nome exercises to add phonation to optimal flowing airstream.  o I provided instruction for semi-occluded vocal tract exercises with a straw and water resistance  o She did not have a straw available, but did very well using the SOVT configuration of a puffed upper lip  o Able to hear and feel the improvement  o good learning, but will need practice     IMPRESSIONS/GOALS/PLAN  Dr. Nelson had a productive session of speech therapy today, to address the following:  Chronic Throat Clearing (R68.89)   Dysphonia (R49.0)   Speech therapy for her is medically necessary to allow  her to meet personal and professional demands and fully engage in activities of daily living.     She will continue to work on her exercises on a daily basis, and work on incorporating the techniques into her daily activities.    Progress  toward long-term goals: (Reporting period: 12/12/23  to 3/11/24)  Minimal at this point, as this is first session, but good learning today    Goals for this practice period:     practice all exercises according to instructions    Plan: I will see Dr. Nelson in May, when she returns from wintering in [place], to work on education, modification, and carryover of therapeutic activities to more complex activities.    Reporting period 12/12/23 - 3/11/24    TOTAL SERVICE TIME: 62 minutes  TREATMENT (22230)      Maday Goldsmith, Ph.D., [facility]SLP  Speech-Language Pathologist  Director, [facility]  She/her/hers  [phone]

## 2023-12-18 NOTE — LETTER
12/18/2023       RE: Deb Nelson  [address]  [place] [address]     Dear Colleague,    Thank you for referring your patient, Deb Nelson, to the [facility]ICE [facility] [place] [facility]. Please see a copy of my visit note below.    Deb Nelson is a 78 year old female who is being evaluated via a billable video visit.      Deb has been notified and verbally consented to the following:   This video visit will be conducted between you and your provider.  Patient has opted to conduct today's video visit vs an in-person appointment.   Video visits are billed at different rates depending on your insurance coverage. Please reach out to your insurance provider with any questions.   If during the course of the call the provider feels the appointment is not appropriate, you will not be charged for this service.  Provider has received verbal consent for billable virtual visit from the patient? Yes  Will anyone else be joining your video visit? No    Call initiated at: 10:30 AM Technical problems for 10 minutes   Type of Visit Platform Used: [employer] Video  Location of provider: Home  Location of patient: [facility] VOICE [facility]  Karthikeyan Johnson Jr., M.D., F.A.C.S.  Gaby Esparza M.D., M.P.H.  Emma Moon M.D.  Tg Mayer M.M. (voice), M.A., CCC-SLP  Anum Leong M.S., CCC-SLP  Maday Goldsmith, Ph.D., CCC-SLP  Jonathon Stokes, Ph.D., CCC-SLP  Fifi Jewell M.S., CCC-SLP  Jair Villarreal M.M., M.A., CCC-SLP  PRECIOUS Dyson (voice), M.S., CCC-SLP    [facility]  VOICE/SPEECH/BREATHING THERAPY PROGRESS REPORT    Patient: Deb Nelson  Date of Service: 12/18/2023    Date of Last Service: 12/12/23  Referring physician: Dr. Moon  Initial evaluation: 12/12/23  Therapy Session #1     I had the pleasure of seeing Ms. Nelson today, for speech therapy to address a diagnosis of:  Chronic Cough (R05.3)    Dysphonia (R49.0)  Irritable Larynx Syndrome (ILS) (J38.7).    PROGRESS SINCE LAST SESSION  Dr. Nelson was seen for evaluation on 12/12/23.  At that time, it was determined that she would benefit from a course of speech therapy to address the above diagnosis.  Since then, she states she has not had any change in her symptoms, which is expected, as no therapeutic suggestions were made at the time.    Dr. Nelson also states that:  Her problem is more of a throat-clear than a cough  The voice problem seems to be a reaction to the dryness and throat-clearing  Her voice gets bad within 10 minutes of giving a presentation     Dr. Nelson presents today with the following:  Voice quality:  WNL, essentially clear  Narrow resonance  Pitch sounds appropriate  Cough/ Throat clear:  Frequent gentle dry throat clears early in the session; these resolved as I taught her strategies for suppression    THERAPEUTIC ACTIVITIES  Today Dr. Nelson participated in the following therapeutic activities:  Lovejoy concepts and techniques for improving topical hydration   I provided instruction for techniques and strategies to reduce the chronic cough/throat clearing  alternative behaviors such as voiceless glottic coup, humming, swallowing, etc. were taught  strategies for reducing mucosal irritation were taught  I provided a brochure by email (patient requested) and we went through it together  Lovejoy exercises to add phonation to optimal flowing airstream.  I provided instruction for semi-occluded vocal tract exercises with a straw and water resistance  She did not have a straw available, but did very well using the SOVT configuration of a puffed upper lip  Able to hear and feel the improvement  good learning, but will need practice     IMPRESSIONS/GOALS/PLAN  Dr. Nelson had a productive session of speech therapy today, to address the following:  Chronic Throat Clearing (R68.89)   Dysphonia (R49.0)   Speech  therapy for her is medically necessary to allow  her to meet personal and professional demands and fully engage in activities of daily living.     She will continue to work on her exercises on a daily basis, and work on incorporating the techniques into her daily activities.    Progress toward long-term goals: (Reporting period: 12/12/23  to 3/11/24)  Minimal at this point, as this is first session, but good learning today    Goals for this practice period:   practice all exercises according to instructions    Plan: I will see Dr. Nelson in May, when she returns from [facility] in [place], to work on education, modification, and carryover of therapeutic activities to more complex activities.    Reporting period 12/12/23 - 3/11/24    TOTAL SERVICE TIME: 62 minutes  TREATMENT (61468)      Maday Goldsmith, Ph.D., [facility]-SLP  Speech-Language Pathologist  Director, [facility]  She/her/hers  [phone]                                                                                                 Outpatient Speech Language Therapy Evaluation  PLAN OF TREATMENT FOR OUTPATIENT REHABILITATION  (COMPLETE FOR INITIAL CLAIMS ONLY)  Patient's Last Name, First Name, M.I.  YOB: 1945  OmarDeb LAY                        Provider's Name  Maday Goldsmith, SLP Medical Record No.  [id number]                               Onset Date:  12/12/23   Start of Care Date: 12/12/23     Type: Speech Language Therapy Medical Diagnosis: Chronic Cough.                        Therapy Diagnosis:  Chronic Cough.   Visits from SOC:  2   _________________________________________________________________________________  Plan of Treatment:   Speech therapy    DURATION/FREQUENCY OF TREATMENT  Six bi-weekly, one-hour sessions, with two monthly one-hour follow-up sessions    PROGNOSIS  Good prognosis for voice improvement with speech therapy and regular practice of therapeutic activities.    BARRIERS TO LEARNING/TEACHING AND  LEARNING NEEDS  None/Unremarkable    Goals:  Patient goal:   1. To have a normal and acceptable voice quality and comfort for all her voice needs  2.  To reduce her cough to acceptable levels    Long-term goal(s): In 9 months, Ms. Nelson will:  1. Report a week of typical vocal activities, in which dysphonia does not exceed a level of 2 out of 10, 80% of the time   2. Report a week with no more than two episodes of coughing or throat-clearing per day, that do not last more than one second  _________________________________________________________________________________    I CERTIFY THE NEED FOR THESE SERVICES FURNISHED UNDER        THIS PLAN OF TREATMENT AND WHILE UNDER MY CARE     (Physician attestation of this document indicates review and certification of the therapy plan).     Certification date from: 12/12/23  Certification date to: 3/11/24    Referring Provider: Emma Moon MD         Again, thank you for allowing me to participate in the care of your patient.      Sincerely,    Maday Goldsmith, SLP

## 2023-12-19 NOTE — PROGRESS NOTES
Outpatient Speech Language Therapy Evaluation  PLAN OF TREATMENT FOR OUTPATIENT REHABILITATION  (COMPLETE FOR INITIAL CLAIMS ONLY)  Patient's Last Name, First Name, M.I.  YOB: 1945  Deb Nelson                        Provider's Name  Maday HARPREETLester Agus, SLP Medical Record No.  [id number]                               Onset Date:  12/12/23   Start of Care Date: 12/12/23     Type: Speech Language Therapy Medical Diagnosis: Chronic Cough.                        Therapy Diagnosis:  Chronic Cough.   Visits from SOC:  2   _________________________________________________________________________________  Plan of Treatment:   Speech therapy    DURATION/FREQUENCY OF TREATMENT  Six bi-weekly, one-hour sessions, with two monthly one-hour follow-up sessions    PROGNOSIS  Good prognosis for voice improvement with speech therapy and regular practice of therapeutic activities.    BARRIERS TO LEARNING/TEACHING AND LEARNING NEEDS  None/Unremarkable    Goals:  Patient goal:   1. To have a normal and acceptable voice quality and comfort for all her voice needs  2.  To reduce her cough to acceptable levels    Long-term goal(s): In 9 months, Ms. Nelson will:  1. Report a week of typical vocal activities, in which dysphonia does not exceed a level of 2 out of 10, 80% of the time   2. Report a week with no more than two episodes of coughing or throat-clearing per day, that do not last more than one second  _________________________________________________________________________________    I CERTIFY THE NEED FOR THESE SERVICES FURNISHED UNDER        THIS PLAN OF TREATMENT AND WHILE UNDER MY CARE     (Physician attestation of this document indicates review and certification of the therapy plan).     Certification date from: 12/12/23  Certification date to: 3/11/24    Referring Provider: Emma Moon MD

## 2024-01-27 ENCOUNTER — HEALTH MAINTENANCE LETTER (OUTPATIENT)
Age: 79
End: 2024-01-27

## 2024-02-22 ENCOUNTER — TELEPHONE (OUTPATIENT)
Dept: DERMATOLOGY | Facility: CLINIC | Age: 79
End: 2024-02-22
Payer: MEDICARE

## 2024-05-07 ENCOUNTER — TELEPHONE (OUTPATIENT)
Dept: OBGYN | Facility: CLINIC | Age: 79
End: 2024-05-07
Payer: MEDICARE

## 2024-05-09 ENCOUNTER — OFFICE VISIT (OUTPATIENT)
Dept: FAMILY MEDICINE | Facility: CLINIC | Age: 79
End: 2024-05-09
Payer: MEDICARE

## 2024-05-09 DIAGNOSIS — Z80.8 FAMILY HISTORY OF MELANOMA: ICD-10-CM

## 2024-05-09 DIAGNOSIS — L60.8 LONGITUDINAL MELANONYCHIA: ICD-10-CM

## 2024-05-09 DIAGNOSIS — L81.4 LENTIGINES: ICD-10-CM

## 2024-05-09 DIAGNOSIS — D18.01 CHERRY ANGIOMA: ICD-10-CM

## 2024-05-09 DIAGNOSIS — L82.1 SK (SEBORRHEIC KERATOSIS): ICD-10-CM

## 2024-05-09 DIAGNOSIS — D22.9 MULTIPLE BENIGN NEVI: Primary | ICD-10-CM

## 2024-05-09 DIAGNOSIS — L57.0 AK (ACTINIC KERATOSIS): ICD-10-CM

## 2024-05-09 DIAGNOSIS — L72.0 EIC (EPIDERMAL INCLUSION CYST): ICD-10-CM

## 2024-05-09 DIAGNOSIS — D49.2 NEOPLASM OF SKIN: ICD-10-CM

## 2024-05-09 PROCEDURE — 11103 TANGNTL BX SKIN EA SEP/ADDL: CPT | Performed by: PHYSICIAN ASSISTANT

## 2024-05-09 PROCEDURE — 11102 TANGNTL BX SKIN SINGLE LES: CPT | Performed by: PHYSICIAN ASSISTANT

## 2024-05-09 PROCEDURE — 88341 IMHCHEM/IMCYTCHM EA ADD ANTB: CPT | Mod: 26 | Performed by: DERMATOLOGY

## 2024-05-09 PROCEDURE — 99214 OFFICE O/P EST MOD 30 MIN: CPT | Mod: 25 | Performed by: PHYSICIAN ASSISTANT

## 2024-05-09 PROCEDURE — 88305 TISSUE EXAM BY PATHOLOGIST: CPT | Mod: 26 | Performed by: DERMATOLOGY

## 2024-05-09 PROCEDURE — 17000 DESTRUCT PREMALG LESION: CPT | Mod: XS | Performed by: PHYSICIAN ASSISTANT

## 2024-05-09 PROCEDURE — 88342 IMHCHEM/IMCYTCHM 1ST ANTB: CPT | Mod: 26 | Performed by: DERMATOLOGY

## 2024-05-09 ASSESSMENT — PAIN SCALES - GENERAL: PAINLEVEL: NO PAIN (0)

## 2024-05-09 NOTE — PATIENT INSTRUCTIONS
Wound Care After a Biopsy    What is a skin biopsy?  A skin biopsy allows the doctor to examine a very small piece of tissue under the microscope to determine the diagnosis and the best treatment for the skin condition. A local anesthetic (numbing medicine) is injected with a very small needle into the skin area to be tested. A small piece of skin is taken from the area. Sometimes a suture (stitch) is used.     What are the risks of a skin biopsy?  I will experience scar, bleeding, swelling, pain, crusting and redness. I may experience incomplete removal or recurrence. Risks of this procedure are excessive bleeding, bruising, infection, nerve damage, numbness, thick (hypertrophic or keloidal) scar and non-diagnostic biopsy.    How should I care for my wound for the first 24 hours?  Keep the wound dry and covered for 24 hours  If it bleeds, hold direct pressure on the area for 15 minutes. If bleeding does not stop, call us or go to the emergency room  Avoid strenuous exercise the first 1-2 days or as your doctor instructs you    How should I care for the wound after 24 hours?  After 24 hours, remove the bandage  You may bathe or shower as normal  If you had a scalp biopsy, you can shampoo as usual and can use shower water to clean the biopsy site daily  Clean the wound once a day with gentle soap and water  Do not scrub, be gentle  Apply white petroleum/Vaseline after cleaning the wound with a cotton swab or a clean finger, and keep the site covered with a Bandaid /bandage. Bandages are not necessary with a scalp biopsy  If you are unable to cover the site with a Bandaid /bandage, re-apply ointment 2-3 times a day to keep the site moist. Moisture will help with healing  Avoid strenuous activity for first 1-2 days  Avoid lakes, rivers, pools, and oceans until the stitches are removed or the site is healed    How do I clean my wound?  Wash hands thoroughly with soap or use hand  before all wound care  Clean  the wound with gentle soap and water  Apply white petroleum/Vaseline  to wound after it is clean  Replace the Bandaid /bandage to keep the wound covered for the first few days or as instructed by your doctor  If you had a scalp biopsy, warm shower water to the area on a daily basis should suffice    What should I use to clean my wound?   Cotton-tipped applicators (Qtips )  White petroleum jelly (Vaseline ). Use a clean new container and use Q-tips to apply.  Bandaids  as needed  Gentle soap     How should I care for my wound long term?  Do not get your wound dirty  Keep up with wound care for one week or until the area is healed.  You may return to our clinic for this or you may have it done locally at your doctor s office.  A small scab will form and fall off by itself when the area is completely healed. The area will be red and will become pink in color as it heals. Sun protection is very important for how your scar will turn out. Sunscreen with an SPF 30 or greater is recommended once the area is healed.  You should have some soreness but it should be mild and slowly go away over several days. Talk to your doctor about using tylenol for pain,    When should I call my doctor?  If you have increased:   Pain or swelling  Pus or drainage (clear or slightly yellow drainage is ok)  Temperature over 100F  Spreading redness or warmth around wound    When will I hear about my results?  The biopsy results can take 2 weeks to come back.  Your results will automatically release to [employer] before your provider has even reviewed them.  The clinic will call you with the results, send you a [employer] message, or have you schedule a follow-up clinic or phone time to discuss the results.  Contact our clinics if you do not hear from us in 2 weeks.    Who should I call with questions?  [facility]: [phone]  [facility]: [phone]  For urgent needs outside of  business hours call the [facility] at [phone] and ask for the dermatology resident on call Patient Education       Proper skin care from [facility]:    -Eliminate harsh soaps as they strip the natural oils from the skin, often resulting in dry itchy skin ( i.e. Dial, Zest, Albanian Spring)  -Use mild soaps such as Cetaphil or Dove Sensitive Skin in the shower. You do not need to use soap on arms, legs, and trunk every time you shower unless visibly soiled.   -Avoid hot or cold showers.  -After showering, lightly dry off and apply moisturizing within 2-3 minutes. This will help trap moisture in the skin.   -Aggressive use of a moisturizer at least 1-2 times a day to the entire body (including -Vanicream, Cetaphil, Aquaphor or Cerave) and moisturize hands after every washing.  -We recommend using moisturizers that come in a tub that needs to be scooped out, not a pump. This has more of an oil base. It will hold moisture in your skin much better than a water base moisturizer. The above recommended are non-pore clogging.      Wear a sunscreen with at least SPF 30 on your face, ears, neck and V of the chest daily. Wear sunscreen on other areas of the body if those areas are exposed to the sun throughout the day. Sunscreens can contain physical and/or chemical blockers. Physical blockers are less likely to clog pores, these include zinc oxide and titanium dioxide. Reapply every two hour and after swimming.     Sunscreen examples: https://www.ewg.org/sunscreen/    UV radiation  UVA radiation remains constant throughout the day and throughout the year. It is a longer wavelength than UVB and therefore penetrates deeper into the skin leading to immediate and delayed tanning, photoaging, and skin cancer. 70-80% of UVA and UVB radiation occurs between the hours of 10am-2pm.  UVB radiation  UVB radiation causes the most harmful effects and is more significant during the summer months. However, snow and ice can  reflect UVB radiation leading to skin damage during the winter months as well. UVB radiation is responsible for tanning, burning, inflammation, delayed erythema (pinkness), pigmentation (brown spots), and skin cancer.     I recommend self monthly full body exams and yearly full body exams with a dermatology provider. If you develop a new or changing lesion please follow up for examination. Most skin cancers are pink and scaly or pink and pearly. However, we do see blue/brown/black skin cancers.  Consider the ABCDEs of melanoma when giving yourself your monthly full body exam ( don't forget the groin, buttocks, feet, toes, etc). A-asymmetry, B-borders, C-color, D-diameter, E-elevation or evolving. If you see any of these changes please follow up in clinic. If you cannot see your back I recommend purchasing a hand held mirror to use with a larger wall mirror.       Checking for Skin Cancer  You can find cancer early by checking your skin each month. There are 3 kinds of skin cancer. They are melanoma, basal cell carcinoma, and squamous cell carcinoma. Doing monthly skin checks is the best way to find new marks or skin changes. Follow the instructions below for checking your skin.   The ABCDEs of checking moles for melanoma   Check your moles or growths for signs of melanoma using ABCDE:   Asymmetry: the sides of the mole or growth don t match  Border: the edges are ragged, notched, or blurred  Color: the color within the mole or growth varies  Diameter: the mole or growth is larger than 6 mm (size of a pencil eraser)  Evolving: the size, shape, or color of the mole or growth is changing (evolving is not shown in the images below)    Checking for other types of skin cancer  Basal cell carcinoma or squamous cell carcinoma have symptoms such as:     A spot or mole that looks different from all other marks on your skin  Changes in how an area feels, such as itching, tenderness, or pain  Changes in the skin's surface, such  as oozing, bleeding, or scaliness  A sore that does not heal  New swelling or redness beyond the border of a mole    Who s at risk?  Anyone can get skin cancer. But you are at greater risk if you have:   Fair skin, light-colored hair, or light-colored eyes  Many moles or abnormal moles on your skin  A history of sunburns from sunlight or tanning beds  A family history of skin cancer  A history of exposure to radiation or chemicals  A weakened immune system  If you have had skin cancer in the past, you are at risk for recurring skin cancer.   How to check your skin  Do your monthly skin checkups in front of a full-length mirror. Check all parts of your body, including your:   Head (ears, face, neck, and scalp)  Torso (front, back, and sides)  Arms (tops, undersides, upper, and lower armpits)  Hands (palms, backs, and fingers, including under the nails)  Buttocks and genitals  Legs (front, back, and sides)  Feet (tops, soles, toes, including under the nails, and between toes)  If you have a lot of moles, take digital photos of them each month. Make sure to take photos both up close and from a distance. These can help you see if any moles change over time.   Most skin changes are not cancer. But if you see any changes in your skin, call your doctor right away. Only he or she can diagnose a problem. If you have skin cancer, seeing your doctor can be the first step toward getting the treatment that could save your life.   [employer] last reviewed this educational content on 4/1/2019 2000-2020 [employer]. [address] [place] [address], [place] [address]. All rights reserved. This information is not intended as a substitute for professional medical care. Always follow your healthcare professional's instructions.       When should I call my doctor?  If you are worsening or not improving, please, contact us or seek urgent care as noted below.     Who should I call with questions (adults)?  [facility]  [facility] (adult and pediatric): [phone]  [facility] (adult): [phone]  [facility] ([facility] [place] and [place]) [phone]  For urgent needs outside of business hours call the [facility] at [phone] and ask for the dermatology resident on call to be paged  If this is a medical emergency and you are unable to reach an ER, Call [phone]      If you need a prescription refill, please contact your pharmacy. Refills are approved or denied by our Physicians during normal business hours, Monday through Fridays  Per office policy, refills will not be granted if you have not been seen within the past year (or sooner depending on your child's condition)

## 2024-05-09 NOTE — LETTER
5/9/2024         RE: Deb Nelson  [address] [facility] [address]        Dear Colleague,    Thank you for referring your patient, Deb Nelson, to the [facility] [place]. Please see a copy of my visit note below.    [facility] Dermatology Note  Encounter Date: May 9, 2024  Office Visit      Dermatology Problem List:  FBSE: 5/9/24    # NUB x2  S/p Biopsy performed on 5/9/24  1. AK's s/p cryo.  2. Longitudinal melanonychia - R great toenail - 3mm width, monitor for changes, unchanged 5/5/22    Fhx: melanoma - sister  ____________________________________________    Assessment & Plan:  # Neoplasm of unspecified behavior of the skin (D49.2) on the R nasal dorsum. The differential diagnosis includes NMSC vs other.   - Shave biopsy performed today, see procedure note below.   - Photographed today     # Neoplasm of unspecified behavior of the skin (D49.2) on the L shin . The differential diagnosis includes NMSC vs other vs other.   - Shave biopsy performed today, see procedure note below.   - Photographed today     # AK x1 on R eyebrow  - cryotherapy performed, see procedure note below    # EIC x2 - L labia  - reassured benign    # Longitudinal melanonychia - R great toenail  - unable to evaluate today due to nail polish in place  - patient does check it each time she paints her nails and reports today that it has not changed    # Family history of melanoma (sister)  - Continue photoprotection - recommend SPF 30 or higher with frequent reapplication  - Continue yearly skin exams  - Advised to monitor for changing, non-healing, bleeding, painful, changing, or otherwise symptomatic lesions     # Benign findings: multiple benign nevi, lentigines, cherry angiomas, SKs  - edu on benign etiology  - Signs and Symptoms of non-melanoma skin cancer and ABCDEs of melanoma reviewed with patient. Patient encouraged to perform monthly self skin exams and educated on how to  perform them. UV precautions reviewed with patient. Patient was asked about new or changing moles/lesions on body.   - Sunscreen: Apply 20 minutes prior to going outdoors and reapply every two hours, when wet or sweating. We recommend using an SPF 30 or higher, and to use one that is water resistant.     - RTC for changes    Procedures Performed:   - Shave biopsy procedure note, location(s): x 2 . After discussion of benefits and risks including but not limited to bleeding, infection, scar, incomplete removal, recurrence, and non-diagnostic biopsy, written consent and photographs were obtained. The area was cleaned with isopropyl alcohol. 0.5mL of 1% lidocaine with epinephrine was injected to obtain adequate anesthesia of lesion(s). Shave biopsy at site(s) performed. Hemostasis was achieved with aluminium chloride. Petrolatum ointment and a sterile dressing were applied. The patient tolerated the procedure and no complications were noted. The patient was provided with verbal and written post care instructions.      CRYOTHERAPY PROCEDURE NOTE: 1 lesions in the above locations were treated with liquid nitrogen utilizing a 5-10sec thaw time. Patient was advised that the treated areas will become red, swollen, may develop a blister and then should crust and peal off in the next 1-2 weeks. Post-procedure instructions were provided.    Follow-up: 1  year or pending path results    Staff and scribe:    Scribe Disclosure:   I, POLO MORRELL, am serving as a scribe; to document services personally performed by Kaylen Bñauelos PA-C -based on data collection and the provider's statements to me.     Provider Disclosure:  I agree with above History, Review of Systems, Physical exam and Plan.  I have reviewed the content of the documentation and have edited it as needed. I have personally performed the services documented here and the documentation accurately represents those services and the decisions I have made.       Electronically signed by:    All risks, benefits and alternatives were discussed with patient.  Patient is in agreement and understands the assessment and plan.  All questions were answered.    Kaylen Bañuelos PA-C, MPAS  [facility] [place]: Phone: [phone], Fax: [phone]  [facility]: Phone: [phone],  Fax: [phone]  [facility]: Phone: [phone], Fax: [phone]  ____________________________________________    CC: Skin Check (FBSC/)      Reviewed patients past medical history and pertinent chart review prior to patient's visit today.     HPI:  Ms. Deb Nelson is a 78 year old female who presents today as a return patient for FBSC.     Today patient reported a spot of concern on her nose, two spots on the labia. Spot on nose has been present a couple months, does not seem to heal. FHx melanoma.      Patient is otherwise feeling well, without additional concerns.    Labs:  N/A    Physical Exam:  Vitals: There were no vitals taken for this visit.  SKIN: Full skin, which includes the head/face, both arms, chest, back, abdomen,both legs, genitalia and/or groin buttocks, digits and/or nails, was examined.   -  Mathew's skin type II, has <100 nevi  - There are dome shaped bright red papules on the trunk.   - Multiple regular brown pigmented macules and papules are identified on the trunk and extremities.   - Scattered brown macules on sun exposed areas.  - There are waxy stuck on tan to brown papules on the trunk.   - R nasal dorsum there is a 4 mm pink papule   - L labia there are 2 cystic lesions, 2 mm in size, white papules  - L shin there is a 7 mm pink scaly macule with white streak centrally  - There is/are erythematous macules with overlying adherent scale on the R eyebrow x1  - No other lesions of concern on areas examined.               Medications:  Current Outpatient  Medications   Medication Sig Dispense Refill     Acetaminophen (TYLENOL PO) Take 325 mg by mouth PRN       calcium carbonate 500 mg (elemental) (OSCAL 500) 1250 (500 Ca) MG TABS tablet Take by mouth daily       mvw complete formulation (CHEWABLES) tablet Take 1 tablet by mouth daily       ofloxacin (FLOXIN) 0.3 % otic solution        zoledronic acid (ZOMETA) 4 MG/100ML SOLN Inject 4 mg into the vein once       No current facility-administered medications for this visit.      Past Medical/Surgical History:   Patient Active Problem List   Diagnosis     H/O senile osteopenia     Dyspepsia     History of cataract     Generalized osteoarthritis of multiple sites     History of meningioma of the brain     Recurrent cholesteatoma of postmastoidectomy cavity, unspecified laterality     AK (actinic keratosis)     Anatomical narrow angle borderline glaucoma     Brow ptosis     Degenerative, intervertebral disc, cervical     Family history of malignant neoplasm of gastrointestinal tract     Family history of musculoskeletal disease     Fx wrist     Meningioma (H)     Mixed conductive and sensorineural hearing loss of right ear with restricted hearing of left ear     Nonexudative age-related macular degeneration, bilateral, early dry stage     Pseudophakia, both eyes     PVD (posterior vitreous detachment)     Right shoulder pain     Senile osteoporosis     Past Medical History:   Diagnosis Date     Arthritis      Cataract      Glaucoma      Meningioma (H)     Neurosurgeon in [facility]     Osseous obstruction of eustachian tube (aka CHOLESTEOTOMA)                         Again, thank you for allowing me to participate in the care of your patient.        Sincerely,        Kaylen Bañuelos PA-C

## 2024-05-09 NOTE — PROGRESS NOTES
[facility] Dermatology Note  Encounter Date: May 9, 2024  Office Visit      Dermatology Problem List:  FBSE: 5/9/24    # NUB x2  S/p Biopsy performed on 5/9/24  1. AK's s/p cryo.  2. Longitudinal melanonychia - R great toenail - 3mm width, monitor for changes, unchanged 5/5/22    Fhx: melanoma - sister  ____________________________________________    Assessment & Plan:  # Neoplasm of unspecified behavior of the skin (D49.2) on the R nasal dorsum. The differential diagnosis includes NMSC vs other.   - Shave biopsy performed today, see procedure note below.   - Photographed today     # Neoplasm of unspecified behavior of the skin (D49.2) on the L shin . The differential diagnosis includes NMSC vs other vs other.   - Shave biopsy performed today, see procedure note below.   - Photographed today     # AK x1 on R eyebrow  - cryotherapy performed, see procedure note below    # EIC x2 - L labia  - reassured benign    # Longitudinal melanonychia - R great toenail  - unable to evaluate today due to nail polish in place  - patient does check it each time she paints her nails and reports today that it has not changed    # Family history of melanoma (sister)  - Continue photoprotection - recommend SPF 30 or higher with frequent reapplication  - Continue yearly skin exams  - Advised to monitor for changing, non-healing, bleeding, painful, changing, or otherwise symptomatic lesions     # Benign findings: multiple benign nevi, lentigines, cherry angiomas, SKs  - edu on benign etiology  - Signs and Symptoms of non-melanoma skin cancer and ABCDEs of melanoma reviewed with patient. Patient encouraged to perform monthly self skin exams and educated on how to perform them. UV precautions reviewed with patient. Patient was asked about new or changing moles/lesions on body.   - Sunscreen: Apply 20 minutes prior to going outdoors and reapply every two hours, when wet or sweating. We recommend using an SPF 30 or higher,  and to use one that is water resistant.     - RTC for changes    Procedures Performed:   - Shave biopsy procedure note, location(s): x 2 . After discussion of benefits and risks including but not limited to bleeding, infection, scar, incomplete removal, recurrence, and non-diagnostic biopsy, written consent and photographs were obtained. The area was cleaned with isopropyl alcohol. 0.5mL of 1% lidocaine with epinephrine was injected to obtain adequate anesthesia of lesion(s). Shave biopsy at site(s) performed. Hemostasis was achieved with aluminium chloride. Petrolatum ointment and a sterile dressing were applied. The patient tolerated the procedure and no complications were noted. The patient was provided with verbal and written post care instructions.      CRYOTHERAPY PROCEDURE NOTE: 1 lesions in the above locations were treated with liquid nitrogen utilizing a 5-10sec thaw time. Patient was advised that the treated areas will become red, swollen, may develop a blister and then should crust and peal off in the next 1-2 weeks. Post-procedure instructions were provided.    Follow-up: 1  year or pending path results    Staff and scribe:    Scribe Disclosure:   I, POLO MORRELL, am serving as a scribe; to document services personally performed by Kaylen Bañuelos PA-C -based on data collection and the provider's statements to me.     Provider Disclosure:  I agree with above History, Review of Systems, Physical exam and Plan.  I have reviewed the content of the documentation and have edited it as needed. I have personally performed the services documented here and the documentation accurately represents those services and the decisions I have made.      Electronically signed by:    All risks, benefits and alternatives were discussed with patient.  Patient is in agreement and understands the assessment and plan.  All questions were answered.    Kaylen Bañuelos PA-C, MPAS  [facility]  [facility]: Phone: [phone], Fax: [phone]  [facility]: Phone: [phone],  Fax: [phone]  [facility] [place]: Phone: [phone], Fax: [phone]  ____________________________________________    CC: Skin Check (FBSC/)      Reviewed patients past medical history and pertinent chart review prior to patient's visit today.     HPI:  Ms. Deb Nelson is a 78 year old female who presents today as a return patient for FBSC.     Today patient reported a spot of concern on her nose, two spots on the labia. Spot on nose has been present a couple months, does not seem to heal. FHx melanoma.      Patient is otherwise feeling well, without additional concerns.    Labs:  N/A    Physical Exam:  Vitals: There were no vitals taken for this visit.  SKIN: Full skin, which includes the head/face, both arms, chest, back, abdomen,both legs, genitalia and/or groin buttocks, digits and/or nails, was examined.   -  Mathew's skin type II, has <100 nevi  - There are dome shaped bright red papules on the trunk.   - Multiple regular brown pigmented macules and papules are identified on the trunk and extremities.   - Scattered brown macules on sun exposed areas.  - There are waxy stuck on tan to brown papules on the trunk.   - R nasal dorsum there is a 4 mm pink papule   - L labia there are 2 cystic lesions, 2 mm in size, white papules  - L shin there is a 7 mm pink scaly macule with white streak centrally  - There is/are erythematous macules with overlying adherent scale on the R eyebrow x1  - No other lesions of concern on areas examined.               Medications:  Current Outpatient Medications   Medication Sig Dispense Refill    Acetaminophen (TYLENOL PO) Take 325 mg by mouth PRN      calcium carbonate 500 mg (elemental) (OSCAL 500) 1250 (500 Ca) MG TABS tablet Take by mouth daily      mvw complete formulation (CHEWABLES) tablet Take 1 tablet by mouth  daily      ofloxacin (FLOXIN) 0.3 % otic solution       zoledronic acid (ZOMETA) 4 MG/100ML SOLN Inject 4 mg into the vein once       No current facility-administered medications for this visit.      Past Medical/Surgical History:   Patient Active Problem List   Diagnosis    H/O senile osteopenia    Dyspepsia    History of cataract    Generalized osteoarthritis of multiple sites    History of meningioma of the brain    Recurrent cholesteatoma of postmastoidectomy cavity, unspecified laterality    AK (actinic keratosis)    Anatomical narrow angle borderline glaucoma    Brow ptosis    Degenerative, intervertebral disc, cervical    Family history of malignant neoplasm of gastrointestinal tract    Family history of musculoskeletal disease    Fx wrist    Meningioma (H)    Mixed conductive and sensorineural hearing loss of right ear with restricted hearing of left ear    Nonexudative age-related macular degeneration, bilateral, early dry stage    Pseudophakia, both eyes    PVD (posterior vitreous detachment)    Right shoulder pain    Senile osteoporosis     Past Medical History:   Diagnosis Date    Arthritis     Cataract     Glaucoma     Meningioma (H)     Neurosurgeon in [facility]    Osseous obstruction of eustachian tube (aka CHOLESTEOTOMA)

## 2024-05-17 LAB
PATH REPORT.COMMENTS IMP SPEC: NORMAL
PATH REPORT.FINAL DX SPEC: NORMAL
PATH REPORT.GROSS SPEC: NORMAL
PATH REPORT.MICROSCOPIC SPEC OTHER STN: NORMAL
PATH REPORT.RELEVANT HX SPEC: NORMAL

## 2024-05-20 ENCOUNTER — TELEPHONE (OUTPATIENT)
Dept: FAMILY MEDICINE | Facility: CLINIC | Age: 79
End: 2024-05-20
Payer: MEDICARE

## 2024-05-20 NOTE — TELEPHONE ENCOUNTER
----- Message from Kaylen Bañuelos PA-C sent at 5/20/2024  9:28 AM CDT -----  A) AK -needs to follow up for Ln2  B) BLK - benign - no need for further tx    A. Right nasal dorsum:  - Actinic keratosis and superficial dermal fibrosis - (see comment and description)     B. Left shin:  - Benign lichenoid keratosis - (see description)

## 2024-05-20 NOTE — LETTER
May 29, 2024      Deb LAY Omar  [address]  [facility] [address]        Dear americafortunato,    We are writing to inform you of your test results. I have been unable to reach you by phone or Mychart.    The right nasal dorsum is a pre cancer called an actinic keratosis- this does need another treatment to remove the rest of the abnormal cells. We use cryo therapy to freeze the remaining cells.    The left shin- Benign- non cancerous lichenoid keratosis- nothing more for this site.    Please schedule another appointment for this in the next few months    Resulted Orders   Dermatological Path Order and Indications   Result Value Ref Range    Case Report       Surgical Pathology Report                         Case: [id number]                                  Authorizing Provider:  Kaylen Bañuelos PA-C     Collected:           05/09/2024 09:55 AM          Ordering Location:     [facility]   Received:            05/09/2024 11:24 AM                                 [place]                                                                 Pathologist:           Pete Marley MD                                                       Specimens:   A) - Skin, R nasal dorsum                                                                           B) - Skin, L shin                                                                          Final Diagnosis       A. Right nasal dorsum:  - Actinic keratosis and superficial dermal fibrosis - (see comment and description)    B. Left shin:  - Benign lichenoid keratosis - (see description)         Comment       A.  In the superficial dermis, there is a modest increase in perivascular spindle cells, which may represent an early fibrous papule.  There is no evidence of invasive carcinoma or melanoma.      Clinical Information       The patient is a 78 year old female.       Gross Description       A(1). Skin, R nasal dorsum:  The specimen is received in  "formalin with proper patient identification, labeled \"R nasal dorsum\".  The specimen consists of a 0.2 cm white-tan skin shave remarkable for a 0.1 cm brown-tan lesion.  The subcutaneous surface is inked black, and the specimen is entirely submitted in cassette A1.   B(2). Skin, L shin:  The specimen is received in formalin with proper patient identification, labeled \"L shin\".  The specimen consists of a 0.5 x 0.5 cm white-tan skin shave remarkable for a 0.5 cm brown-tan lesion.  The subcutaneous surface is inked black, and the specimen is sectioned and entirely submitted in cassette B1.       Microscopic Description       A. The specimen exhibits intermittent parakeratosis, mild epidermal hyperplasia and mild keratinocyte atypia in the lower half of the epidermis.  In the superficial dermis, there is a modest increase in perivascular spindle cells; these cells are negative for SOX10 and p40.  TheB. The specimen exhibits compact orthokeratosis, mild epidermal hyperplasia with basal layer vacuolization, scattered necrotic keratinocytes and a patchy lichenoid lymphocytic infiltrate. P40 stained sections are negative for evidence of an invasive carcinoma there is no evidence of malignancy.       Performing Labs       The technical component of this testing was completed at [facility].    Stain controls for all stains resulted within this report have been reviewed and show appropriate reactivity.          If you have any questions or concerns, please call the clinic at the number listed above.       Sincerely,       Kaylen Bañuelos PA-C./antoinette              "

## 2024-05-23 NOTE — TELEPHONE ENCOUNTER
Patient Contact    Attempt # 2    Was call answered?  No.  Left message on voicemail with information to call nurse back at [phone].  My chart message sent with Kaylen's message below about biopsy results.    Isha SMITH RN  [facility] [place]  [phone]

## 2024-05-29 NOTE — TELEPHONE ENCOUNTER
Patient Contact    Attempt # 3    Was call answered?  No  Left message on answering machine for patient to call back.    Sent letter to home address      Thank you,    Shannon BETANCOURTRN BSN  [facility]- [phone]

## 2024-06-03 NOTE — PROGRESS NOTES
Physician Attestation   I agree with the information in this note.    Emma Rose     Wound care following

## 2024-06-19 NOTE — PROGRESS NOTES
Outpatient Speech Language Therapy Evaluation  PLAN OF TREATMENT FOR OUTPATIENT REHABILITATION  (COMPLETE FOR INITIAL CLAIMS ONLY)  Patient's Last Name, First Name, M.I.  YOB: 1945  Deb Nelson                        Provider's Name  Tg JOANNLester Leyla, SLP Medical Record No.  [id number]                               Onset Date:  12/12/23   Start of Care Date: 12/12/23     Type: Speech Language Therapy Medical Diagnosis: No primary diagnosis found.                        Therapy Diagnosis:  No primary diagnosis found.   Visits from SOC:  1   _________________________________________________________________________________  Plan of Treatment:   Speech therapy    DURATION/FREQUENCY OF TREATMENT  Six weekly, one-hour sessions, with two monthly one-hour follow-up sessions    PROGNOSIS  Good prognosis for voice improvement with speech therapy and regular practice of therapeutic activities.    BARRIERS TO LEARNING/TEACHING AND LEARNING NEEDS  None/Unremarkable      GOALS:  Patient goal:   To improve and maintain a healthy voice quality  To understand the problem and fix it as much as possible  To have a normal and acceptable voice quality  To reduce her throat clearingto acceptable levels    Short-term goal(s): Within the first 4 sessions, Ms. Nelson:  will be able to demonstrate provided throat clearing suppression and substitution strategies from memory independently with 90% accuracy  will be able to independently list key factors in maintenance of good vocal hygiene with 80% accuracy, and report on their use outside the therapy room.  will utilize silent inhalation with good low-respiratory engagement 75% of the time during therapy tasks with minimal clinician support  will demonstrate semi-occluded vocal tract (SOVT) exercises with at least 80% accuracy with no clinician support    Long-term goal(s): In 6 months,    [facility] - Internal Medicine Discharge Summary   Date of Service: 6/19/2024                                Parker Acevedo MRN# [id number]   YOB: 1972 Age: 51 year old      Date of Admission:  6/14/2024  Date of Discharge:  6/19/2024  7:02 PM  Discharging Physician: Romeo Bingham MD    Discharging Service:  Internal Medicine  Hospitalization Status: Inpatient    Discharge Diagnosis:   1. Central line associated blood stream infection secondary to Methicillin sensitive staph epidermidis   2. 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 Severe Chronic Malnutrition  4. Status post gastric bypass surgery (2002)  5. Chronic Pain  6. Opioid dependence  7. ADHD  8. Paroxysmal atrial fibrillation  9. History of DVT    HPI:   Parker Acevedo is a 51 year old man with history including RNY gastric bypass c/b malnutrition on TPN who was admitted on 6/15/2024. He presented to his PCP with fever 10 days prior to admission and S epidermidis was isolated from a single blood culture from that day (6/3/24). He presented several days after called back for this result.   Hospital course:   Parker has had multiple similar admission with recurrent infections from chonic TPN and central line dependence. He had the prior line removed and was support with other means. He had a CVC replaced on 6/19/24 for support of TPN and IV antibiotics to treat the infection. He was seen by ID who felt cefazolin was an adequate choice to continue through 6/29.   After placement of the line, the patient and his wife were noted to be very restless and agitated, very anxious to discharge. However, the care coordination team notified me that home care agency may not be able to provide abx in timely fashion. He has been through multiple home care agency (inc [facility]) and discharged for various reasons (seems like mostly non-adherence to lab draws). I strongly advised the patient to remain in the hospital until  Ms. Nelson will:  Report resolution of dysphonia, and use of optimal voice quality to meet personal, social, and professional needs, 90% of the time during a typical week of vocal activities_________________________________________________________________________________    I CERTIFY THE NEED FOR THESE SERVICES FURNISHED UNDER        THIS PLAN OF TREATMENT AND WHILE UNDER MY CARE     (Physician attestation of this document indicates review and certification of the therapy plan).     Certification date from: 12/12/23  Certification date to: 3/11/24    Referring Provider: Ofe Suero      we can confirm the antibiotic coverage and the time of day that the antibiotic could be delivered. We also became aware that this home infusion company has not been providing the TPN yet and that prior TPN providers could not continue providing due to inability for patient to obtain the needed lab draws. I also noted the concerns from prior nursing and share the concerns that something else might be motivating his behaviors. However, he denies any concerns with substance use or other factors that we could address to help him remain in the hospital. I did sign the discharge orders earlier in the day without knowledge of the antibiotic issue. He remains decisional and can leave despite strong recommendation to remain. Ultimately, the home care agency does have the order and will likely be able to resume abx in the very near future. He had understanding of the risks.  On deeper review of the chart, ming one year ago he had a simialr admission and was found with an un-idenfied syringe in his bed. Addicition medicine was consulted and he did not engage very much but denied any issues with substances and no use disorder was diagnosed. Ultimately, he had CVCC replaced. In the event of a repeat admission with concern for CLABSI, would consider review and testing of illcit non-precribed substances as untreated and unaddressed substance use disorers may place him at extremely high risk for serious complications    Discharge Disposition:   Home  Discharge Exam:  General: Agitated, passing thr room, minimally engaged with conversation, rarely looking at me during conversation, occn leaving room in the middle of my sentences  Resp: No apparent increased WOB  Cardiac: Normal pulses. Well perfused   Abdomen:Nondistended.   Extremities: No LE edema or obvious joint abnormalities  Skin: Warm and dry, no jaundice or rash  Neuro: Alert & oriented x 3, Cns 2-12 intact, moves all extremities equally           Discharge Medications:  "    Discharge Medication List as of 6/19/2024  7:02 PM        START taking these medications    Details   ceFAZolin (ANCEF) intermittent infusion 2 g in 100 mL dextrose PRE-MIX Inject 100 mLs (2 g) into the vein every 8 hours for 10 days, Disp-3000 mL, R-0, Local Print      traZODone (DESYREL) 50 MG tablet Take 1 tablet (50 mg) by mouth nightly as needed for sleep, Disp-10 tablet, R-0, E-Prescribe           CONTINUE these medications which have NOT CHANGED    Details   albuterol (VENTOLIN HFA) 108 (90 Base) MCG/ACT inhaler Inhale 2 puffs into the lungs every 6 hours as needed, Disp-18 g, R-1, E-PrescribePharmacy may dispense brand covered by insurance (Proair, or proventil or ventolin or generic albuterol inhaler)      ALPRAZolam (XANAX) 0.5 MG tablet TAKE ONE TABLET BY MOUTH TWICE A DAY AS NEEDED FOR SLEEP OR ANXIETY, Disp-60 tablet, R-0, E-Prescribe      amphetamine-dextroamphetamine (ADDERALL) 20 MG tablet TAKE ONE TABLET BY MOUTH ONCE DAILY, Disp-30 tablet, R-0, E-Prescribe      carvedilol (COREG) 6.25 MG tablet TAKE 2 TABLETS (12.5 MG) BY MOUTH 2 TIMES DAILY WITH MEALS, Disp-360 tablet, R-0, E-Prescribe      cyanocobalamin (CYANOCOBALAMIN) 1000 MCG/ML injection Inject 1 mL (1,000 mcg) into the muscle every 30 days, Disp-1 mL, R-3, E-Prescribe      enoxaparin ANTICOAGULANT (LOVENOX) 80 MG/0.8ML syringe Inject 0.8 mLs (80 mg) Subcutaneous 2 times daily INJECT THE CONTENTS OF ONE SYRINGE (80MG) UNDER THE SKIN TWO TIMES A DAY, Disp-67.2 mL, R-0, E-Prescribe      [facility] INFUSION MANAGED PATIENT Contact [facility] for patient specific medication information at [phone] on admission and discharge from the hospital.  Phones are answered 24 hours a day 7 days a week 365 days a year.    Providers - Choose \"CONTINUE HOME MED (no scr ipt)\" at discharge if patient treatment with home infusion will continue., No Print OutResume prior to admission TPN/Lipids/saline      fentaNYL (DURAGESIC) 25 mcg/hr " "72 hr patch Place 1 patch onto the skin every 48 hours OK to fill 6/24/24 with start 6/26/24. 30 day supply for chronic pain., Disp-15 patch, R-0, E-Prescribe      Lidocaine (LIDOCARE) 4 % Patch Place 1 patch onto the skin daily as needed for moderate pain To prevent lidocaine toxicity, patient should be patch free for 12 hrs daily.Historical      lipids plant base (CLINOLIPID) 20 % infusion Inject 250 mLs into the vein every 24 hours, Disp-1000 mL, R-3, E-Prescribe      naloxone (NARCAN) 4 MG/0.1ML nasal spray Spray 1 spray (4 mg) into one nostril alternating nostrils as needed for opioid reversal every 2-3 minutes until assistance arrives, Disp-0.2 mL, R-3, E-Prescribe      nystatin (MYCOSTATIN) 683750 UNIT/GM external cream Apply topically daily as needed for other (around area of J tube)Historical      ondansetron (ZOFRAN ODT) 8 MG ODT tab DISSOLVE ONE TABLET BY MOUTH EVERY 8 HOURS AS NEEDED FOR NAUSEA, Disp-90 tablet, R-1, E-Prescribe      oxyCODONE (ROXICODONE) 5 MG/5ML solution Take 5-10 mLs (5-10 mg) by mouth every 4 hours as needed for moderate to severe pain Max of 40mg/day. OK to fill 6/24/24 with start 6/26/24. 30 day supply for chronic pain., Disp-1200 mL, R-0, E-Prescribe      polyethylene glycol (MIRALAX) 17 GM/Dose powder Take 17 g by mouth as needed for constipation, Historical      sucralfate (CARAFATE) 1 GM/10ML suspension Take 1 g by mouth 4 times daily as needed for nausea, Historical      vitamin D2 (ERGOCALCIFEROL) 72536 units (1250 mcg) capsule Take 1 capsule (50,000 Units) by mouth once a week, Disp-12 capsule, R-3, E-Prescribe      Syringe/Needle, Disp, (B-D ECLIPSE SYRINGE) 27G X 1/2\" 1 ML MISC 1 Device every 30 days, Disp-30 each, R-1, E-Prescribe                  Discharge Instructions and Follow-Up:     Discharge Procedure Orders   OPAT enrollment and ID Clinic Referral   Referral Priority: Routine Referral Type: Consultation   Number of Visits Requested: 1     OPAT enrollment and ID " Clinic Referral   Referral Priority: Routine: Next available opening Referral Type: Consultation   Number of Visits Requested: 1     Primary Care - Care Coordination Referral   Standing Status: Future   Referral Priority: Routine: Next available opening Referral Type: Care Coordination   Number of Visits Requested: 1     Resume Home Care Services   Order Comments: [facility]  Phone: [phone]  Fax: [phone]  * TPN; IV Abx; IVF     Reason for your hospital stay   Order Comments: You were hospitalized with a bacterial infection in your blood stream. The source of this infection was likely coming from your central venous catheter. This was removed and your blood cultures remained negative for several days. We have replaced this central venous catheter and you are able to resume you normal TPN through this line per your prior routine. There is an IV antibiotic which has been ordered and will continue through June 29th.     Activity   Order Comments: Your activity upon discharge: activity as tolerated     Order Specific Question Answer Comments   Is discharge order? Yes      Adult [facility] Follow-up and recommended labs and tests   Order Comments: Follow up with primary care provider, Chuy Isaac, within 14 days for hospital follow- up.  No follow up labs or test are needed.    You have been referred to infectious disease for follow-up of your blood stream infection.       Appointments on [place] and/or [facility] (with [facility] or [facility] provider or service). Call [phone] if you haven't heard regarding these appointments within 7 days of discharge.     Diet   Order Comments: Your can continue your prior diet at discharge there are no new restrictions or modifications     Order Specific Question Answer Comments   Is discharge order? Yes         More than 30 minutes was spent in direct patient counseling and coordination of care. Please do not hesitate to contact me with any  additional questions.     Roland Bingham MD

## 2024-07-20 NOTE — PROGRESS NOTES
ASSESSMENT:  This is a 79-year-old postmenopausal female with osteoporosis by original T-scores and history of fragility fracture.  She initially tried oral alendronate but did not tolerate it.  Subsequently she had 3 IV Reclast infusions in 2021, 2022, and 7/2023.  Her most recent DEXA in July 2023 shows osteopenia.  We discussed calcium and vitamin D.  We will hold on further IV Reclast since she has had 3 infusions.  Will get another DEXA next summer and reevaluate her at that time.    PLAN:  Patient should take 1200mg of calcium/day in divided doses (food and a ll supplements combined) and vitamin D3 1000IU/day.  Food is the best source of calcium  No IV reclast this year   Get DXA next July 2025 and then see me       Thank you for allowing me to participate in the care of your patient.  Please do not hesitate to call with questions or concerns.    Sincerely,    Jei Isabel MD, PhD  Deb Killian NP     Time note (e3, 20'): The total of my time (on the date of service) for this service was 21 minutes, including discussion/face-to-face, chart review, interpretation not otherwise reported, documentation, and updating of the computerized record.        Deb is a  79 year old female post menopausal] [GR0, P0] that presents today for osteoporosis follow up patient was last seen 7/2023. OP by original T scores and hx of fragility fx who tried oral alendronate but did not tolerate it. She subsequently had IV reclast in 5/2021 and 5/2022. And 8/2023.    Referring Physician: Deb Killian NP     HPI     Have you ever had a bone density test? Yes  Where = [place] - different machine  When = 7/2023 8/2020 2014   Spine Tscore = L1-2  -1.0  Left neck Tscore = -0.9  Total left hip Tscore = NA  Right neck Tscore = -1.6  Total Right hip Tscore = NA   Have you received any x-ray dye or contrast in the last ten days? No  How many servings of dairy products do you consume per day? 2 Type: milk cheese  green vegetables    Do you take a multi-vitamin daily? Yes  Do you take a vitamin D supplement? No  Do you take a calcium supplement daily?  Calcium carbonate  TUMS at at bedtime   Do you take a supplement containing strontium? No  Are you exposed to natural sunlight at least 20 minutes three times a week? Yes    Social History   reports that she quit smoking about 39 years ago. Her smoking use included cigarettes. She started smoking about 64 years ago. She has a 51.1 pack-year smoking history. She has never used smokeless tobacco. She reports current alcohol use of about 1.7 standard drinks of alcohol per week. She reports that she does not use drugs.  Do you smoke cigarettes? Reformed smoker  Do you exercise? Yes. Details: walks 3-5 mi/day - 5-6times/wk - yoga   Do you drink alcohol? Yes. Details: 4x/wk     Medication History  Have you used any of the following medications?    Aredia (Pamidronate): No              Boniva (Ibindronate): 15 yrs ago  For about 5 yrs - and good response and stopped it               Didronil (Etidronate): No              Evista (Raloxifene): No              Fosamax (Alendronate): 2 months stopped last Nov 2020  - had severe heartburn              Forteo (Parathyroid hormone) injections: No              HCTZ (Thiazide): No              Calcitonin nasal spray: No              Reclast or Zometa (Zolendronate): 5/2021,  5/2022 , 8/2023               Prolia (Denosumab): No             Current Outpatient Medications   Medication Sig Dispense Refill    Acetaminophen (TYLENOL PO) Take 325 mg by mouth PRN      calcium carbonate 500 mg (elemental) (OSCAL 500) 1250 (500 Ca) MG TABS tablet Take by mouth daily      mvw complete formulation (CHEWABLES) tablet Take 1 tablet by mouth daily      ofloxacin (FLOXIN) 0.3 % otic solution       zoledronic acid (ZOMETA) 4 MG/100ML SOLN Inject 4 mg into the vein once            Allergies   Allergen Reactions    Clindamycin Itching and Rash       Past Medical History    Family  "History   Problem Relation Age of Onset    Colon Cancer Mother     Breast Cancer Mother     Uterine Cancer Mother     Osteoporosis Mother     Dementia Mother     Melanoma Sister     Cerebrovascular Disease Sister 80    Osteoporosis Sister     Coronary Artery Disease Sister 80        EF 50%, s/p ICD    Chronic Obstructive Pulmonary Disease Sister         smoker    Thyroid Disease Sister         not sure which type    Coronary Artery Disease Sister 75        s/p PCI/Federico    Osteoporosis Sister     Cerebrovascular Disease Sister 80    Coronary Artery Disease Sister 80        s/p PCI/DESEAN    Dementia Brother 82    Coronary Artery Disease Brother 80        s/p MI, s/p PCI/DESEAN, s/p ICD    Hypertension Brother     Mental Illness Brother         Schizophrenia/PTSD post [place],  from [facility]    Psychotic Disorder Other         mother, depression; father schizophrenia    Skin Cancer No family hx of        ROS:  General: none  Head/Eyes: none  Ears/Nose/Throat: none  Cardiovascular: none  Respiratory: none  Gastrointestinal: none  Breast: none  Genitourinary: none  Sexual Function: none  Musculoskeletal: none  Skin: none  Neurological: none  Mental Health: none  Endocrine: none    Clinic Measurements  Vitals: BP (!) 146/80   Pulse 65   Ht 1.689 m (5' 6.5\")   Wt 70.8 kg (156 lb)   BMI 24.80 kg/m    BMI= Body mass index is 24.8 kg/m .    Physical exam  Constitutional: Well appearing woman in no acute distress.   Psychological: appropriate mood.  Neck: No thyroidmegaly.  no carotid bruits.  Cardiovascular: regular rate and rhythm, normal S1 and S2, no murmurs, rubs or gallops, peripheral pulses full and symmetric   Respiratory: clear to auscultation, no wheezes or crackles, normal breath sounds.  Musculoskeletal: full range of motion, no edema, and motor strength is equal in the upper and lower extremities    Spine: Straight, not tender, Flexion good, Extension good, Lateral movement good, Rotational " movement good  Skin: no concerning lesions, no jaundice.  Neurological: cranial nerves intact, normal strength, reflexes at patella and biceps normal, normal gait, no tremor.     LAB  Vertebra; Fracture Assessment: NA  Dexa Scan: 7/2023    FRAX Assessment Tool: [N/A, on IV reclast   Risk Factors: age. PM, Tscores, reformed smoker  +FHx

## 2024-07-22 ENCOUNTER — OFFICE VISIT (OUTPATIENT)
Dept: FAMILY MEDICINE | Facility: CLINIC | Age: 79
End: 2024-07-22
Attending: FAMILY MEDICINE
Payer: MEDICARE

## 2024-07-22 VITALS
WEIGHT: 156 LBS | BODY MASS INDEX: 25.07 KG/M2 | HEART RATE: 65 BPM | SYSTOLIC BLOOD PRESSURE: 146 MMHG | DIASTOLIC BLOOD PRESSURE: 80 MMHG | HEIGHT: 66 IN

## 2024-07-22 DIAGNOSIS — M81.0 SENILE OSTEOPOROSIS: Primary | ICD-10-CM

## 2024-07-22 PROCEDURE — G0463 HOSPITAL OUTPT CLINIC VISIT: HCPCS | Performed by: FAMILY MEDICINE

## 2024-07-22 PROCEDURE — 99213 OFFICE O/P EST LOW 20 MIN: CPT | Performed by: FAMILY MEDICINE

## 2024-07-22 RX ORDER — CALCIUM CARBONATE 500 MG/1
500 TABLET, CHEWABLE ORAL ONCE
Status: CANCELLED | OUTPATIENT
Start: 2024-07-22 | End: 2024-07-22

## 2024-07-22 ASSESSMENT — PAIN SCALES - GENERAL: PAINLEVEL: NO PAIN (0)

## 2024-07-22 NOTE — PATIENT INSTRUCTIONS
Patient should take 1200mg of calcium/day in divided doses (food and a ll supplements combined) and vitamin D3 1000IU/day.  Food is the best source of calcium  No IV reclast this year   Get DXA next July 2025 and then see me

## 2024-07-30 ENCOUNTER — TELEPHONE (OUTPATIENT)
Dept: DERMATOLOGY | Facility: CLINIC | Age: 79
End: 2024-07-30
Payer: MEDICARE

## 2024-07-30 NOTE — TELEPHONE ENCOUNTER
S/w pt and gave Kaylen's message below about biopsy results from May.  Pt states she is in [facility] and will back on Thursday to schedule an appt.    we have been trying to get a hold of you for bout 6 weeks now regarding your biopsy results. The spot on your nose requires additional treatment as it is precancerous. Please call our clinic to schedule a follow up. If you have further questions, reach out as my nurses are well versed in this condition and can answer them well.     NIKOLAS Parker RN  [facility] [place]  [phone]

## 2024-10-10 ENCOUNTER — OFFICE VISIT (OUTPATIENT)
Dept: DERMATOLOGY | Facility: CLINIC | Age: 79
End: 2024-10-10
Payer: MEDICARE

## 2024-10-10 DIAGNOSIS — L57.0 AK (ACTINIC KERATOSIS): Primary | ICD-10-CM

## 2024-10-10 DIAGNOSIS — S80.812A EXCORIATION OF LEFT LOWER LEG, INITIAL ENCOUNTER: ICD-10-CM

## 2024-10-10 PROCEDURE — 17003 DESTRUCT PREMALG LES 2-14: CPT | Performed by: PHYSICIAN ASSISTANT

## 2024-10-10 PROCEDURE — 17000 DESTRUCT PREMALG LESION: CPT | Performed by: PHYSICIAN ASSISTANT

## 2024-10-10 NOTE — PROGRESS NOTES
[facility] Dermatology Note  Encounter Date: Oct 10, 2024  Office Visit      Dermatology Problem List:    1. AKs s/p cryo.  - AK , R nasal dorsum, S/p Biopsy 5/9/24, S/p cryo 10/10/24   2. Longitudinal melanonychia - R great toenail - 3mm width, monitor for changes, unchanged 5/5/22  3. Benign Biopsy  - L shin, BLK, S/p Biopsy 5/9/24   4. LTM - R vertex scalp - tx with LN2 today - ISK vs KA - bx if not resolved at follow up    Fhx: melanoma - sister  ____________________________________________    Assessment & Plan:  # AK,   - Bx proven AK on 5/9/24 on her R nasal dorsum   - Cryotherapy performed today, see procedure note below.    # R vertex scalp she has a 6 mm crusted erythematous papule Ddx: irritated iSK vs AK vs other  - Cryotherapy performed today, see procedure note below.  - If present next visit, plan to biopsy.     # excoriation L shin.  - Edu on etiology  - Recommended use of Vaseline and moisturizers.     Procedures Performed:   CRYOTHERAPY PROCEDURE NOTE: 2 lesions in the above locations were treated with liquid nitrogen utilizing a 5-10sec thaw time. Patient was advised that the treated areas will become red, swollen, may develop a blister and then should crust and peal off in the next 1-2 weeks. Post-procedure instructions were provided.    Follow-up: prn for new or changing lesions    Staff and scribe:    Scribe Disclosure:   I, POLO MORRELL, am serving as a scribe; to document services personally performed by Kaylen Bañuelos PA-C -based on data collection and the provider's statements to me.     Provider Disclosure:  I agree with above History, Review of Systems, Physical exam and Plan.  I have reviewed the content of the documentation and have edited it as needed. I have personally performed the services documented here and the documentation accurately represents those services and the decisions I have made.      Electronically signed by:    All risks, benefits and  alternatives were discussed with patient.  Patient is in agreement and understands the assessment and plan.  All questions were answered.    Kaylen Bañuelos PA-C, MPAS  [facility]: Phone: [phone], Fax: [phone]  [facility]: Phone: [phone],  Fax: [phone]  [facility]: Phone: [phone], Fax: [phone]  ____________________________________________    CC: RECHECK (Follow up on cryo nose/New concerns Left shin lesion/Lesion on top on scalp right side noticed 30 hours ago )      Reviewed patients past medical history and pertinent chart review prior to patient's visit today.     HPI:  Ms. Deb Nelson is a 79 year old female who presents today as a return patient for cryotherapy on a bx proven AK on her R nasal dorsum.     Today patient also reported a spot of concern on her L shin and also a spot on her R side of her scalp that she noticed 30 hours ago.     Patient is otherwise feeling well, without additional concerns.    Labs:  Pathology report 5/9/24  Final Diagnosis   A. Right nasal dorsum:  - Actinic keratosis and superficial dermal fibrosis - (see comment and description)     B. Left shin:  - Benign lichenoid keratosis -       Physical Exam:  Vitals: There were no vitals taken for this visit.  SKIN: Focused examination of areas noted below was performed.   - BX proven AK on the R nasal dorsum    - excoriation on the L shin   - R vertex scalp she has a 6 mm crusted erythematous papule Ddx: irritated SK vs AK vs other  - No other lesions of concern on areas examined.     Medications:  Current Outpatient Medications   Medication Sig Dispense Refill    Acetaminophen (TYLENOL PO) Take 325 mg by mouth PRN      mvw complete formulation (CHEWABLES) tablet Take 1 tablet by mouth daily Tums   1 tablet daily      ofloxacin (FLOXIN) 0.3 % otic solution       zoledronic acid (ZOMETA) 4  MG/100ML SOLN Inject 4 mg into the vein once       No current facility-administered medications for this visit.      Past Medical/Surgical History:   Patient Active Problem List   Diagnosis    H/O senile osteopenia    Dyspepsia    History of cataract    Generalized osteoarthritis of multiple sites    History of meningioma of the brain    Recurrent cholesteatoma of postmastoidectomy cavity, unspecified laterality    AK (actinic keratosis)    Anatomical narrow angle borderline glaucoma    Brow ptosis    Degenerative, intervertebral disc, cervical    Family history of malignant neoplasm of gastrointestinal tract    Family history of musculoskeletal disease    Fx wrist    Meningioma (H)    Mixed conductive and sensorineural hearing loss of right ear with restricted hearing of left ear    Nonexudative age-related macular degeneration, bilateral, early dry stage    Pseudophakia, both eyes    PVD (posterior vitreous detachment)    Right shoulder pain    Senile osteoporosis     Past Medical History:   Diagnosis Date    Arthritis     Cataract     Glaucoma     Meningioma (H)     Neurosurgeon in [place]    Osseous obstruction of eustachian tube (aka CHOLESTEOTOMA)

## 2024-10-10 NOTE — LETTER
10/10/2024      Deb Nelson  [address]  [facility] [address]      Dear Colleague,    Thank you for referring your patient, Deb Nelson, to the [facility] [place]. Please see a copy of my visit note below.    [facility] Dermatology Note  Encounter Date: Oct 10, 2024  Office Visit      Dermatology Problem List:    1. AKs s/p cryo.  - AK , R nasal dorsum, S/p Biopsy 5/9/24, S/p cryo 10/10/24   2. Longitudinal melanonychia - R great toenail - 3mm width, monitor for changes, unchanged 5/5/22  3. Benign Biopsy  - L shin, BLK, S/p Biopsy 5/9/24   4. LTM - R vertex scalp - tx with LN2 today - ISK vs KA - bx if not resolved at follow up    Fhx: melanoma - sister  ____________________________________________    Assessment & Plan:  # AK,   - Bx proven AK on 5/9/24 on her R nasal dorsum   - Cryotherapy performed today, see procedure note below.    # R vertex scalp she has a 6 mm crusted erythematous papule Ddx: irritated iSK vs AK vs other  - Cryotherapy performed today, see procedure note below.  - If present next visit, plan to biopsy.     # excoriation L shin.  - Edu on etiology  - Recommended use of Vaseline and moisturizers.     Procedures Performed:   CRYOTHERAPY PROCEDURE NOTE: 2 lesions in the above locations were treated with liquid nitrogen utilizing a 5-10sec thaw time. Patient was advised that the treated areas will become red, swollen, may develop a blister and then should crust and peal off in the next 1-2 weeks. Post-procedure instructions were provided.    Follow-up: prn for new or changing lesions    Staff and scribe:    Scribe Disclosure:   I, POLO MORRELL, am serving as a scribe; to document services personally performed by Kaylen Bañuelos PA-C -based on data collection and the provider's statements to me.     Provider Disclosure:  I agree with above History, Review of Systems, Physical exam and Plan.  I have reviewed the content of the  documentation and have edited it as needed. I have personally performed the services documented here and the documentation accurately represents those services and the decisions I have made.      Electronically signed by:    All risks, benefits and alternatives were discussed with patient.  Patient is in agreement and understands the assessment and plan.  All questions were answered.    Kaylen Bañuelos PA-C, MPAS  [facility]: Phone: [phone], Fax: [phone]  [facility]: Phone: [phone],  Fax: [phone]  [facility]: Phone: [phone], Fax: [phone]  ____________________________________________    CC: RECHECK (Follow up on cryo nose/New concerns Left shin lesion/Lesion on top on scalp right side noticed 30 hours ago )      Reviewed patients past medical history and pertinent chart review prior to patient's visit today.     HPI:  Ms. Deb Nelson is a 79 year old female who presents today as a return patient for cryotherapy on a bx proven AK on her R nasal dorsum.     Today patient also reported a spot of concern on her L shin and also a spot on her R side of her scalp that she noticed 30 hours ago.     Patient is otherwise feeling well, without additional concerns.    Labs:  Pathology report 5/9/24  Final Diagnosis   A. Right nasal dorsum:  - Actinic keratosis and superficial dermal fibrosis - (see comment and description)     B. Left shin:  - Benign lichenoid keratosis -       Physical Exam:  Vitals: There were no vitals taken for this visit.  SKIN: Focused examination of areas noted below was performed.   - BX proven AK on the R nasal dorsum    - excoriation on the L shin   - R vertex scalp she has a 6 mm crusted erythematous papule Ddx: irritated SK vs AK vs other  - No other lesions of concern on areas examined.     Medications:  Current Outpatient Medications   Medication Sig  Dispense Refill     Acetaminophen (TYLENOL PO) Take 325 mg by mouth PRN       mvw complete formulation (CHEWABLES) tablet Take 1 tablet by mouth daily Tums   1 tablet daily       ofloxacin (FLOXIN) 0.3 % otic solution        zoledronic acid (ZOMETA) 4 MG/100ML SOLN Inject 4 mg into the vein once       No current facility-administered medications for this visit.      Past Medical/Surgical History:   Patient Active Problem List   Diagnosis     H/O senile osteopenia     Dyspepsia     History of cataract     Generalized osteoarthritis of multiple sites     History of meningioma of the brain     Recurrent cholesteatoma of postmastoidectomy cavity, unspecified laterality     AK (actinic keratosis)     Anatomical narrow angle borderline glaucoma     Brow ptosis     Degenerative, intervertebral disc, cervical     Family history of malignant neoplasm of gastrointestinal tract     Family history of musculoskeletal disease     Fx wrist     Meningioma (H)     Mixed conductive and sensorineural hearing loss of right ear with restricted hearing of left ear     Nonexudative age-related macular degeneration, bilateral, early dry stage     Pseudophakia, both eyes     PVD (posterior vitreous detachment)     Right shoulder pain     Senile osteoporosis     Past Medical History:   Diagnosis Date     Arthritis      Cataract      Glaucoma      Meningioma (H)     Neurosurgeon in [facility]     Osseous obstruction of eustachian tube (aka CHOLESTEOTOMA)                         Again, thank you for allowing me to participate in the care of your patient.        Sincerely,        Kaylen Bañuelos PA-C

## 2024-10-10 NOTE — PATIENT INSTRUCTIONS
Cryotherapy    What is it?  Use of a very cold liquid, such as liquid nitrogen, to freeze and destroy abnormal skin cells that need to be removed    What should I expect?  Tenderness and redness  A small blister that might grow and fill with dark purple blood. There may be crusting.  More than one treatment may be needed if the lesions do not go away.    How do I care for the treated area?  Gently wash the area with your hands when bathing.  Use a thin layer of Vaseline to help with healing. You may use a Band-Aid.   The area should heal within 7-10 days and may leave behind a pink or lighter color.   Do not use an antibiotic or Neosporin ointment.   You may take acetaminophen (Tylenol) for pain.     Call your Doctor if you have:  Severe pain  Signs of infection (warmth, redness, cloudy yellow drainage, and or a bad smell)  Questions or concerns    Who should I call with questions?      [facility]: [phone]      [facility]: [phone]      For urgent needs outside of business hours call the [facility] at [phone]        and ask for the dermatology resident on call    Proper skin care from [facility]:    -Eliminate harsh soaps as they strip the natural oils from the skin, often resulting in dry itchy skin ( i.e. Dial, Zest, [place])  -Use mild soaps such as Cetaphil or Dove Sensitive Skin in the shower. You do not need to use soap on arms, legs, and trunk every time you shower unless visibly soiled.   -Avoid hot or cold showers.  -After showering, lightly dry off and apply moisturizing within 2-3 minutes. This will help trap moisture in the skin.   -Aggressive use of a moisturizer at least 1-2 times a day to the entire body (including -Vanicream, Cetaphil, Aquaphor or Cerave) and moisturize hands after every washing.  -We recommend using moisturizers that come in a tub that needs to be scooped out, not a pump. This has  more of an oil base. It will hold moisture in your skin much better than a water base moisturizer. The above recommended are non-pore clogging.      Wear a sunscreen with at least SPF 30 on your face, ears, neck and V of the chest daily. Wear sunscreen on other areas of the body if those areas are exposed to the sun throughout the day. Sunscreens can contain physical and/or chemical blockers. Physical blockers are less likely to clog pores, these include zinc oxide and titanium dioxide. Reapply every two hour and after swimming.     Sunscreen examples: https://www.ewg.org/sunscreen/    UV radiation  UVA radiation remains constant throughout the day and throughout the year. It is a longer wavelength than UVB and therefore penetrates deeper into the skin leading to immediate and delayed tanning, photoaging, and skin cancer. 70-80% of UVA and UVB radiation occurs between the hours of 10am-2pm.  UVB radiation  UVB radiation causes the most harmful effects and is more significant during the summer months. However, snow and ice can reflect UVB radiation leading to skin damage during the winter months as well. UVB radiation is responsible for tanning, burning, inflammation, delayed erythema (pinkness), pigmentation (brown spots), and skin cancer.     I recommend self monthly full body exams and yearly full body exams with a dermatology provider. If you develop a new or changing lesion please follow up for examination. Most skin cancers are pink and scaly or pink and pearly. However, we do see blue/brown/black skin cancers.  Consider the ABCDEs of melanoma when giving yourself your monthly full body exam ( don't forget the groin, buttocks, feet, toes, etc). A-asymmetry, B-borders, C-color, D-diameter, E-elevation or evolving. If you see any of these changes please follow up in clinic. If you cannot see your back I recommend purchasing a hand held mirror to use with a larger wall mirror.       Checking for Skin Cancer  You  can find cancer early by checking your skin each month. There are 3 kinds of skin cancer. They are melanoma, basal cell carcinoma, and squamous cell carcinoma. Doing monthly skin checks is the best way to find new marks or skin changes. Follow the instructions below for checking your skin.   The ABCDEs of checking moles for melanoma   Check your moles or growths for signs of melanoma using ABCDE:   Asymmetry: the sides of the mole or growth don t match  Border: the edges are ragged, notched, or blurred  Color: the color within the mole or growth varies  Diameter: the mole or growth is larger than 6 mm (size of a pencil eraser)  Evolving: the size, shape, or color of the mole or growth is changing (evolving is not shown in the images below)    Checking for other types of skin cancer  Basal cell carcinoma or squamous cell carcinoma have symptoms such as:     A spot or mole that looks different from all other marks on your skin  Changes in how an area feels, such as itching, tenderness, or pain  Changes in the skin's surface, such as oozing, bleeding, or scaliness  A sore that does not heal  New swelling or redness beyond the border of a mole    Who s at risk?  Anyone can get skin cancer. But you are at greater risk if you have:   Fair skin, light-colored hair, or light-colored eyes  Many moles or abnormal moles on your skin  A history of sunburns from sunlight or tanning beds  A family history of skin cancer  A history of exposure to radiation or chemicals  A weakened immune system  If you have had skin cancer in the past, you are at risk for recurring skin cancer.   How to check your skin  Do your monthly skin checkups in front of a full-length mirror. Check all parts of your body, including your:   Head (ears, face, neck, and scalp)  Torso (front, back, and sides)  Arms (tops, undersides, upper, and lower armpits)  Hands (palms, backs, and fingers, including under the nails)  Buttocks and genitals  Legs (front,  back, and sides)  Feet (tops, soles, toes, including under the nails, and between toes)  If you have a lot of moles, take digital photos of them each month. Make sure to take photos both up close and from a distance. These can help you see if any moles change over time.   Most skin changes are not cancer. But if you see any changes in your skin, call your doctor right away. Only he or she can diagnose a problem. If you have skin cancer, seeing your doctor can be the first step toward getting the treatment that could save your life.   [employer] last reviewed this educational content on 4/1/2019 2000-2020 [employer]. [address] [place] [address], [place] [address]. All rights reserved. This information is not intended as a substitute for professional medical care. Always follow your healthcare professional's instructions.       When should I call my doctor?  If you are worsening or not improving, please, contact us or seek urgent care as noted below.     Who should I call with questions (adults)?    [facility] [phone]  For urgent needs outside of business hours call the [facility] at [phone] and ask for the dermatology resident on call to be paged  If this is a medical emergency and you are unable to reach an ER, Call [phone]      If you need a prescription refill, please contact your pharmacy. Refills are approved or denied by our Physicians during normal business hours, Monday through Fridays  Per office policy, refills will not be granted if you have not been seen within the past year (or sooner depending on your child's condition)

## 2024-11-03 ASSESSMENT — ANXIETY QUESTIONNAIRES: GAD7 TOTAL SCORE: 1

## 2024-11-14 ENCOUNTER — TELEPHONE (OUTPATIENT)
Dept: FAMILY MEDICINE | Facility: CLINIC | Age: 79
End: 2024-11-14
Payer: MEDICARE

## 2024-11-14 NOTE — TELEPHONE ENCOUNTER
Reason for call: Schedule appointment     Attempt to reach: 1st    Outcome:Left voicemail    Detailed message left? Yes per Dr.J Amparo Victoria, I got a request to except the spouse of one of my patients, and I would like to except her into my practice as an exception to my practice currently being closed.  Could you please reach out to the following person and schedule an appointment with me at her convenience: Deb Christinalizzeth, birth date is [birth year] - and her email is [email or web address]  Phone [phone].  Thank so much, Azam     Please return call to [facility]     Clinic phone number [phone]

## 2024-12-17 ENCOUNTER — TRANSFERRED RECORDS (OUTPATIENT)
Dept: HEALTH INFORMATION MANAGEMENT | Facility: CLINIC | Age: 79
End: 2024-12-17
Payer: MEDICARE

## 2024-12-18 ENCOUNTER — OFFICE VISIT (OUTPATIENT)
Dept: FAMILY MEDICINE | Facility: CLINIC | Age: 79
End: 2024-12-18
Payer: COMMERCIAL

## 2024-12-18 VITALS
HEART RATE: 77 BPM | WEIGHT: 161.4 LBS | SYSTOLIC BLOOD PRESSURE: 161 MMHG | TEMPERATURE: 98 F | OXYGEN SATURATION: 100 % | RESPIRATION RATE: 14 BRPM | BODY MASS INDEX: 25.33 KG/M2 | DIASTOLIC BLOOD PRESSURE: 95 MMHG | HEIGHT: 67 IN

## 2024-12-18 DIAGNOSIS — Z11.59 NEED FOR HEPATITIS C SCREENING TEST: Primary | ICD-10-CM

## 2024-12-18 DIAGNOSIS — R03.0 ELEVATED BP WITHOUT DIAGNOSIS OF HYPERTENSION: ICD-10-CM

## 2024-12-18 DIAGNOSIS — R22.32 MASS OF LEFT FINGER: ICD-10-CM

## 2024-12-19 ENCOUNTER — PATIENT OUTREACH (OUTPATIENT)
Dept: CARE COORDINATION | Facility: CLINIC | Age: 79
End: 2024-12-19
Payer: MEDICARE

## 2024-12-23 ENCOUNTER — PATIENT OUTREACH (OUTPATIENT)
Dept: CARE COORDINATION | Facility: CLINIC | Age: 79
End: 2024-12-23
Payer: MEDICARE

## 2025-02-01 ENCOUNTER — HEALTH MAINTENANCE LETTER (OUTPATIENT)
Age: 80
End: 2025-02-01

## 2025-02-04 DIAGNOSIS — R22.32 FINGER MASS, LEFT: Primary | ICD-10-CM

## 2025-02-04 NOTE — PROGRESS NOTES
"    [facility] (formerly [facility] Physicians, Orthopaedic Oncology Surgery Consultation  by Ulises Barker M.D.    Deb Nelson MRN# [id number]    YOB: 1945     Requesting physician: Deb Simms            Assessment and Plan:   Assessment:  Left index finger severe \"bone-on-bone\" osteoarthritic changes of the DIP and PIP joints with Romeo's node.  Probable ganglion cyst located on ulnar side of left index finger between DIP and PIP joint.  Plan:  Discussed that the pain she is experiencing is likely due to arthritic changes throughout her left hand.  Conservative treatment options include rest, avoidance of painful activity, over-the-counter pain medication i.e. Tylenol, and possible intra-articular steroid injections.  If her pain worsens or begins to affect her ADLs, she should follow-up with hand surgery to review both conservative and surgical treatment options.  Given her history, symptoms, physical exam, and radiological findings, mass in her finger is likely a ganglion cyst.  Discussed conservative treatment options including rest, avoidance of painful activity, aspiration and injection.  Given that her mass is mostly asymptomatic and benign, do not believe surgical intervention is warranted at this time.  Will proceed with a aspiration and injection today in clinic.  Procedure note listed below.  Discussed that is very possible her cyst will return.  She will follow-up with any new or worsening symptoms, otherwise follow-up as needed.      Procedure note:  Under sterile precautions, the left index finger soft tissue adjacent to the middle phalanx was aspirated and produced 1 cc of viscous gelatinous fluid and .5 cc of Depo-Medrol and 0.5 cc of 1% lidocaine was instilled into the cyst cavity.  There were no complications.  She tolerated the procedure well.      Agus Morris PA-C assisted in today's visit.    Attending MD (Dr. Ulises Barker) Attestation :  This patient was seen and " evaluated by me including a history, exam, and interpretation of all imaging and/or lab data.  I formulated the treatment plan along with either a physician's assistant (NIKOLAS) or training physician (resident/fellow), who also saw the patient. That individual or a scribe has documented the visit in the attached note which I approve.    MD Flip Baker [facility] Professor  Oncology and Adult Reconstructive Surgery  Dept Orthopaedic Surgery, [facility]  [phone] office, [phone] pager  [email or web address]        This note was created using dictation software and may contain errors.  Please contact the creator for any clarifications that are needed.            History of Present Illness:   chief complaint: Left index finger mass    Deb Nelson is a 79-year-old female who presents with a mass on her left index finger x 1.5 years, more painful in the last 6 months.  Mass is located on ulnar side between DIP and PIP joint of left index finger.  Denies any initial injury or trauma.  Notes her pain is located to the DIP and PIP joint.  No pain over the mass.  She notes a history of arthritic changes throughout both of her hands, for which he uses Tylenol as needed for pain management.  Additional history of osteopenia, she receives annual Reclast infusions.  She states that her current symptoms do not limit her from any activities of daily living, however she does experience some pain within her DIP and PIP with use.  Denies any paresthesias, weakness, systemic symptoms i.e. fever, night sweats, chills, unexpected weight loss.     Current symptoms:  Problem: Left finger growth   Onset and duration: about a year and a half   Awakens from sleep due to sx's:  No  Precipitating Injury:  No    Other joints or sites painful:  No  Fever: No  Appetite change or weight loss: No  History of prior or existing cancer: No    Background history:  DX:  Osteopenia       TREATMENTS:  Reclast infusions annually,  "1200 mg of calcium/day, vitamin D3 1000 IU/day, last DEXA scan 2023, Dr. Isabel             Physical Exam:     EXAMINATION pertinent findings:   PSYCH: Pleasant, healthy-appearing, alert, oriented x3, cooperative. Normal mood and affect.  VITAL SIGNS: Height 1.705 m (5' 7.13\"), weight 72.1 kg (159 lb), not currently breastfeeding..  Reviewed nursing intake notes.   Body mass index is 24.81 kg/m .  RESP: non labored breathing   ABD: benign, soft, non-tender, no acute peritoneal findings  SKIN: grossly normal   LYMPHATIC: grossly normal, no adenopathy, no extremity edema  NEURO: grossly normal , no motor deficits  VASCULAR: satisfactory perfusion of all extremities   MUSCULOSKELETAL: Right index finger  Palpable mass over middle phalanx, palmar side of DIP and PIP joint of left index finger.  Mass is nontender, fluctuant, mobile, cystic like.  ROM: Able to make fist, full extension, full, abduction/adduction  Strength: 5/5 flexion, extension, abduction, adduction  skin: No evidence of lesion, abrasion, deformity     Full sensation to light palpation throughout index finger  Normal capillary refill             Data:   All laboratory data reviewed  All imaging studies reviewed by me    XR 3 views left index finger on 2/17/2025:  My interpretation: Evidence of severe bone-on-bone degenerative in both DIP and PIP joint with Romeo's node. Small amount of soft tissue edema over volar aspect of middle phalanx. No evidence of fracture, tumor, dislocation.      DATA for DOCUMENTATION:         Past Medical History:     Patient Active Problem List   Diagnosis    H/O senile osteopenia    Dyspepsia    History of cataract    Generalized osteoarthritis of multiple sites    History of meningioma of the brain    Recurrent cholesteatoma of postmastoidectomy cavity, unspecified laterality    AK (actinic keratosis)    Anatomical narrow angle borderline glaucoma    Brow ptosis    Degenerative, intervertebral disc, cervical    Family " history of malignant neoplasm of gastrointestinal tract    Family history of musculoskeletal disease    Fx wrist    Meningioma (H)    Mixed conductive and sensorineural hearing loss of right ear with restricted hearing of left ear    Nonexudative age-related macular degeneration, bilateral, early dry stage    Pseudophakia, both eyes    PVD (posterior vitreous detachment)    Right shoulder pain    Senile osteoporosis     Past Medical History:   Diagnosis Date    Arthritis     Cataract     Glaucoma     Meningioma (H)     Neurosurgeon in [facility]    Osseous obstruction of eustachian tube (aka CHOLESTEOTOMA)        Also see scanned health assessment forms.       Past Surgical History:     Past Surgical History:   Procedure Laterality Date    APPENDECTOMY      CATARACT IOL, RT/LT      COLONOSCOPY      COLONOSCOPY N/A 2021    Procedure: COLONOSCOPY, WITH POLYPECTOMY AND BIOPSY;  Surgeon: Mac Soto MD;  Location: [facility]    ENT SURGERY      cholesteotoma    ESOPHAGOSCOPY, GASTROSCOPY, DUODENOSCOPY (EGD), COMBINED N/A 2021    Procedure: ESOPHAGOGASTRODUODENOSCOPY, WITH BIOPSY;  Surgeon: Mac Soto MD;  Location: [facility] EXCISION OF CHOLESTEATOMA, MIDDLE EAR              Social History:     Social History     Socioeconomic History    Marital status:      Spouse name: Not on file    Number of children: Not on file    Years of education: Not on file    Highest education level: Not on file   Occupational History    Not on file   Tobacco Use    Smoking status: Former     Current packs/day: 0.00     Average packs/day: 2.0 packs/day for 25.6 years (51.1 ttl pk-yrs)     Types: Cigarettes     Start date: 1960     Quit date: 1985     Years since quittin.5    Smokeless tobacco: Never   Vaping Use    Vaping status: Never Used   Substance and Sexual Activity    Alcohol use: Yes     Alcohol/week: 1.7 standard drinks of alcohol     Comment: 7 per week    Drug use: No     Sexual activity: Yes     Partners: Female     Birth control/protection: Post-menopausal   Other Topics Concern    Parent/sibling w/ CABG, MI or angioplasty before 65F 55M? 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 [place]     Social Drivers of Health     Financial Resource Strain: Low Risk  (12/18/2024)    Financial Resource Strain     Within the past 12 months, have you or your family members you live with been unable to get utilities (heat, electricity) when it was really needed?: No   Food Insecurity: Low Risk  (12/18/2024)    Food Insecurity     Within the past 12 months, did you worry that your food would run out before you got money to buy more?: No     Within the past 12 months, did the food you bought just not last and you didn t have money to get more?: No   Transportation Needs: Low Risk  (12/18/2024)    Transportation Needs     Within the past 12 months, has lack of transportation kept you from medical appointments, getting your medicines, non-medical meetings or appointments, work, or from getting things that you need?: No   Physical Activity: Not on file   Stress: Not on file   Social Connections: Not on file   Interpersonal Safety: Low Risk  (12/18/2024)    Interpersonal Safety     Do you feel physically and emotionally safe where you currently live?: Yes     Within the past 12 months, have you been hit, slapped, kicked or otherwise physically hurt by someone?: No     Within the past 12 months, have you been humiliated or emotionally abused in other ways by your partner or ex-partner?: No   Housing Stability: Low Risk  (12/18/2024)    Housing Stability     Do you have housing? : Yes     Are you worried about losing your housing?: No            Family History:       Family History   Problem Relation Age of Onset    Colon Cancer Mother     Breast Cancer Mother     Uterine Cancer Mother     Osteoporosis  Mother     Dementia Mother     Melanoma Sister     Cerebrovascular Disease Sister 80    Osteoporosis Sister     Coronary Artery Disease Sister 80        EF 50%, s/p ICD    Chronic Obstructive Pulmonary Disease Sister         smoker    Thyroid Disease Sister         not sure which type    Coronary Artery Disease Sister 75        s/p PCI/Federico    Osteoporosis Sister     Cerebrovascular Disease Sister 80    Coronary Artery Disease Sister 80        s/p PCI/DESEAN    Dementia Brother 82    Coronary Artery Disease Brother 80        s/p MI, s/p PCI/DESEAN, s/p ICD    Hypertension Brother     Mental Illness Brother         Schizophrenia/PTSD post [place],  from [facility]    Psychotic Disorder Other         mother, depression; father schizophrenia    Skin Cancer No family hx of             Medications:     Current Outpatient Medications   Medication Sig Dispense Refill    Acetaminophen (TYLENOL PO) Take 325 mg by mouth PRN      mvw complete formulation (CHEWABLES) tablet Take 1 tablet by mouth daily Tums   1 tablet daily      ofloxacin (FLOXIN) 0.3 % otic solution       zoledronic acid (ZOMETA) 4 MG/100ML SOLN Inject 4 mg into the vein once       No current facility-administered medications for this visit.              Review of Systems:   A comprehensive 10 point review of systems (constitutional, ENT, cardiac, peripheral vascular, lymphatic, respiratory, GI, , Musculoskeletal, skin, Neurological) was performed and found to be negative except as described in this note.     See intake form completed by patient    Small Joint Injection/Arthrocentesis: L index DIP    Date/Time: 2025 9:50 AM    Performed by: Ulises Barker MD  Authorized by: Ulises Barker MD    Needle Size:  21 G  Guidance: landmark     Approach:  Ulnar  Location:  Index finger    Site:  L index DIP                    Medications:  20 mg methylPREDNISolone 40 MG/ML; 0.5 mL lidocaine (PF) 1 %  Aspirate amount (mL):  1    Aspirate:  Clear       Outcome:  Tolerated well, no immediate complications  Procedure discussed: discussed risks, benefits, and alternatives    Consent Given by:  Patient  Timeout: timeout called immediately prior to procedure    Prep: patient was prepped and draped in usual sterile fashion

## 2025-02-06 NOTE — TELEPHONE ENCOUNTER
Action February 6, 2025 12:18 PM MT   Action Taken Sent a request for imaging from .      DIAGNOSIS: MASS OF LEFT FINGER   APPOINTMENT DATE: 02/17/2025   NOTES STATUS DETAILS   OFFICE NOTE from referring provider Internal 12/18/2024 - Den Crespo MD - [facility]   OFFICE NOTE from other specialist Care Everywhere 09/02/2016 -  Physical Therapy    07/22/2016 - Adrianna Light MD - [facility]   XRAYS (IMAGES & REPORTS) In process [facility]:  07/22/2016, 07/01/2016 - Left wrist

## 2025-02-17 ENCOUNTER — OFFICE VISIT (OUTPATIENT)
Dept: ORTHOPEDICS | Facility: CLINIC | Age: 80
End: 2025-02-17
Attending: FAMILY MEDICINE
Payer: COMMERCIAL

## 2025-02-17 ENCOUNTER — ANCILLARY PROCEDURE (OUTPATIENT)
Dept: GENERAL RADIOLOGY | Facility: CLINIC | Age: 80
End: 2025-02-17
Attending: ORTHOPAEDIC SURGERY
Payer: COMMERCIAL

## 2025-02-17 ENCOUNTER — PRE VISIT (OUTPATIENT)
Dept: ORTHOPEDICS | Facility: CLINIC | Age: 80
End: 2025-02-17

## 2025-02-17 VITALS — BODY MASS INDEX: 24.96 KG/M2 | HEIGHT: 67 IN | WEIGHT: 159 LBS

## 2025-02-17 DIAGNOSIS — R22.32 MASS OF LEFT FINGER: ICD-10-CM

## 2025-02-17 DIAGNOSIS — R22.32 FINGER MASS, LEFT: ICD-10-CM

## 2025-02-17 PROCEDURE — 99203 OFFICE O/P NEW LOW 30 MIN: CPT | Mod: 25 | Performed by: ORTHOPAEDIC SURGERY

## 2025-02-17 PROCEDURE — 20605 DRAIN/INJ JOINT/BURSA W/O US: CPT | Performed by: ORTHOPAEDIC SURGERY

## 2025-02-17 PROCEDURE — 73130 X-RAY EXAM OF HAND: CPT | Mod: LT | Performed by: RADIOLOGY

## 2025-02-17 PROCEDURE — 20600 DRAIN/INJ JOINT/BURSA W/O US: CPT | Mod: LT | Performed by: ORTHOPAEDIC SURGERY

## 2025-02-17 RX ORDER — LIDOCAINE HYDROCHLORIDE 10 MG/ML
0.5 INJECTION, SOLUTION EPIDURAL; INFILTRATION; INTRACAUDAL; PERINEURAL
Status: COMPLETED | OUTPATIENT
Start: 2025-02-17 | End: 2025-02-17

## 2025-02-17 RX ORDER — METHYLPREDNISOLONE ACETATE 40 MG/ML
20 INJECTION, SUSPENSION INTRA-ARTICULAR; INTRALESIONAL; INTRAMUSCULAR; SOFT TISSUE
Status: COMPLETED | OUTPATIENT
Start: 2025-02-17 | End: 2025-02-17

## 2025-02-17 RX ADMIN — METHYLPREDNISOLONE ACETATE 20 MG: 40 INJECTION, SUSPENSION INTRA-ARTICULAR; INTRALESIONAL; INTRAMUSCULAR; SOFT TISSUE at 09:50

## 2025-02-17 RX ADMIN — LIDOCAINE HYDROCHLORIDE 0.5 ML: 10 INJECTION, SOLUTION EPIDURAL; INFILTRATION; INTRACAUDAL; PERINEURAL at 09:50

## 2025-02-17 NOTE — LETTER
"2/17/2025      Deb Nelson  [address]  [facility] [address]      Dear Colleague,    Thank you for referring your patient, Deb Nelson, to the [facility] [place]. Please see a copy of my visit note below.        [facility], Orthopaedic Oncology Surgery Consultation  by Ulises Barker M.D.    Deb Nelson MRN# [id number]    YOB: 1945     Requesting physician: Deb Simms            Assessment and Plan:   Assessment:  Left index finger severe \"bone-on-bone\" osteoarthritic changes of the DIP and PIP joints with Romeo's node.  Probable ganglion cyst located on ulnar side of left index finger between DIP and PIP joint.  Plan:  Discussed that the pain she is experiencing is likely due to arthritic changes throughout her left hand.  Conservative treatment options include rest, avoidance of painful activity, over-the-counter pain medication i.e. Tylenol, and possible intra-articular steroid injections.  If her pain worsens or begins to affect her ADLs, she should follow-up with hand surgery to review both conservative and surgical treatment options.  Given her history, symptoms, physical exam, and radiological findings, mass in her finger is likely a ganglion cyst.  Discussed conservative treatment options including rest, avoidance of painful activity, aspiration and injection.  Given that her mass is mostly asymptomatic and benign, do not believe surgical intervention is warranted at this time.  Will proceed with a aspiration and injection today in clinic.  Procedure note listed below.  Discussed that is very possible her cyst will return.  She will follow-up with any new or worsening symptoms, otherwise follow-up as needed.      Procedure note:  Under sterile precautions, the left index finger soft tissue adjacent to the middle phalanx was aspirated and produced 1 cc of viscous gelatinous fluid and .5 cc of Depo-Medrol and " 0.5 cc of 1% lidocaine was instilled into the cyst cavity.  There were no complications.  She tolerated the procedure well.      Agus Morris PA-C assisted in today's visit.    Attending MD (Dr. Ulises Barker) Attestation :  This patient was seen and evaluated by me including a history, exam, and interpretation of all imaging and/or lab data.  I formulated the treatment plan along with either a physician's assistant (NIKOLAS) or training physician (resident/fellow), who also saw the patient. That individual or a scribe has documented the visit in the attached note which I approve.    Ulises Barker MD  [facility] Family Professor  Oncology and Adult Reconstructive Surgery  Dept Orthopaedic Surgery, [facility]  [phone] office, [phone] pager  [email or web address]        This note was created using dictation software and may contain errors.  Please contact the creator for any clarifications that are needed.            History of Present Illness:   chief complaint: Left index finger mass    Deb Nelson is a 79-year-old female who presents with a mass on her left index finger x 1.5 years, more painful in the last 6 months.  Mass is located on ulnar side between DIP and PIP joint of left index finger.  Denies any initial injury or trauma.  Notes her pain is located to the DIP and PIP joint.  No pain over the mass.  She notes a history of arthritic changes throughout both of her hands, for which he uses Tylenol as needed for pain management.  Additional history of osteopenia, she receives annual Reclast infusions.  She states that her current symptoms do not limit her from any activities of daily living, however she does experience some pain within her DIP and PIP with use.  Denies any paresthesias, weakness, systemic symptoms i.e. fever, night sweats, chills, unexpected weight loss.     Current symptoms:  Problem: Left finger growth   Onset and duration: about a year and a half   Awakens from sleep  "due to sx's:  No  Precipitating Injury:  No    Other joints or sites painful:  No  Fever: No  Appetite change or weight loss: No  History of prior or existing cancer: No    Background history:  DX:  Osteopenia       TREATMENTS:  Reclast infusions annually, 1200 mg of calcium/day, vitamin D3 1000 IU/day, last DEXA scan 2023, Dr. Isabel             Physical Exam:     EXAMINATION pertinent findings:   PSYCH: Pleasant, healthy-appearing, alert, oriented x3, cooperative. Normal mood and affect.  VITAL SIGNS: Height 1.705 m (5' 7.13\"), weight 72.1 kg (159 lb), not currently breastfeeding..  Reviewed nursing intake notes.   Body mass index is 24.81 kg/m .  RESP: non labored breathing   ABD: benign, soft, non-tender, no acute peritoneal findings  SKIN: grossly normal   LYMPHATIC: grossly normal, no adenopathy, no extremity edema  NEURO: grossly normal , no motor deficits  VASCULAR: satisfactory perfusion of all extremities   MUSCULOSKELETAL: Right index finger  Palpable mass over middle phalanx, palmar side of DIP and PIP joint of left index finger.  Mass is nontender, fluctuant, mobile, cystic like.  ROM: Able to make fist, full extension, full, abduction/adduction  Strength: 5/5 flexion, extension, abduction, adduction  skin: No evidence of lesion, abrasion, deformity     Full sensation to light palpation throughout index finger  Normal capillary refill             Data:   All laboratory data reviewed  All imaging studies reviewed by me    XR 3 views left index finger on 2/17/2025:  My interpretation: Evidence of severe bone-on-bone degenerative in both DIP and PIP joint with Romeo's node. Small amount of soft tissue edema over volar aspect of middle phalanx. No evidence of fracture, tumor, dislocation.      DATA for DOCUMENTATION:         Past Medical History:     Patient Active Problem List   Diagnosis     H/O senile osteopenia     Dyspepsia     History of cataract     Generalized osteoarthritis of multiple sites     " History of meningioma of the brain     Recurrent cholesteatoma of postmastoidectomy cavity, unspecified laterality     AK (actinic keratosis)     Anatomical narrow angle borderline glaucoma     Brow ptosis     Degenerative, intervertebral disc, cervical     Family history of malignant neoplasm of gastrointestinal tract     Family history of musculoskeletal disease     Fx wrist     Meningioma (H)     Mixed conductive and sensorineural hearing loss of right ear with restricted hearing of left ear     Nonexudative age-related macular degeneration, bilateral, early dry stage     Pseudophakia, both eyes     PVD (posterior vitreous detachment)     Right shoulder pain     Senile osteoporosis     Past Medical History:   Diagnosis Date     Arthritis      Cataract      Glaucoma      Meningioma (H)     Neurosurgeon in [facility]     Osseous obstruction of eustachian tube (aka CHOLESTEOTOMA)        Also see scanned health assessment forms.       Past Surgical History:     Past Surgical History:   Procedure Laterality Date     APPENDECTOMY  1986     CATARACT IOL, RT/LT       COLONOSCOPY  2015     COLONOSCOPY N/A 6/17/2021    Procedure: COLONOSCOPY, WITH POLYPECTOMY AND BIOPSY;  Surgeon: Mac Soto MD;  Location:  GI     ENT SURGERY  1983    cholesteotoma     ESOPHAGOSCOPY, GASTROSCOPY, DUODENOSCOPY (EGD), COMBINED N/A 6/17/2021    Procedure: ESOPHAGOGASTRODUODENOSCOPY, WITH BIOPSY;  Surgeon: Mac Soto MD;  Location: [facility] EXCISION OF CHOLESTEATOMA, MIDDLE EAR  1982            Social History:     Social History     Socioeconomic History     Marital status:      Spouse name: Not on file     Number of children: Not on file     Years of education: Not on file     Highest education level: Not on file   Occupational History     Not on file   Tobacco Use     Smoking status: Former     Current packs/day: 0.00     Average packs/day: 2.0 packs/day for 25.6 years (51.1 ttl pk-yrs)     Types: Cigarettes      Start date: 1960     Quit date: 1985     Years since quittin.5     Smokeless tobacco: Never   Vaping Use     Vaping status: Never Used   Substance and Sexual Activity     Alcohol use: Yes     Alcohol/week: 1.7 standard drinks of alcohol     Comment: 7 per week     Drug use: No     Sexual activity: Yes     Partners: Female     Birth control/protection: Post-menopausal   Other Topics Concern     Parent/sibling w/ CABG, MI or angioplasty before 65F 55M? 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 [place]     Social Drivers of Health     Financial Resource Strain: Low Risk  (2024)    Financial Resource Strain      Within the past 12 months, have you or your family members you live with been unable to get utilities (heat, electricity) when it was really needed?: No   Food Insecurity: Low Risk  (2024)    Food Insecurity      Within the past 12 months, did you worry that your food would run out before you got money to buy more?: No      Within the past 12 months, did the food you bought just not last and you didn t have money to get more?: No   Transportation Needs: Low Risk  (2024)    Transportation Needs      Within the past 12 months, has lack of transportation kept you from medical appointments, getting your medicines, non-medical meetings or appointments, work, or from getting things that you need?: No   Physical Activity: Not on file   Stress: Not on file   Social Connections: Not on file   Interpersonal Safety: Low Risk  (2024)    Interpersonal Safety      Do you feel physically and emotionally safe where you currently live?: Yes      Within the past 12 months, have you been hit, slapped, kicked or otherwise physically hurt by someone?: No      Within the past 12 months, have you been humiliated or emotionally abused in other ways by your partner or ex-partner?: No   Housing  Stability: Low Risk  (2024)    Housing Stability      Do you have housing? : Yes      Are you worried about losing your housing?: No            Family History:       Family History   Problem Relation Age of Onset     Colon Cancer Mother      Breast Cancer Mother      Uterine Cancer Mother      Osteoporosis Mother      Dementia Mother      Melanoma Sister      Cerebrovascular Disease Sister 80     Osteoporosis Sister      Coronary Artery Disease Sister 80        EF 50%, s/p ICD     Chronic Obstructive Pulmonary Disease Sister         smoker     Thyroid Disease Sister         not sure which type     Coronary Artery Disease Sister 75        s/p PCI/Federico     Osteoporosis Sister      Cerebrovascular Disease Sister 80     Coronary Artery Disease Sister 80        s/p PCI/DESEAN     Dementia Brother 82     Coronary Artery Disease Brother 80        s/p MI, s/p PCI/DESEAN, s/p ICD     Hypertension Brother      Mental Illness Brother         Schizophrenia/PTSD post [place],  [facility]     Psychotic Disorder Other         mother, depression; father schizophrenia     Skin Cancer No family hx of             Medications:     Current Outpatient Medications   Medication Sig Dispense Refill     Acetaminophen (TYLENOL PO) Take 325 mg by mouth PRN       mvw complete formulation (CHEWABLES) tablet Take 1 tablet by mouth daily Tums   1 tablet daily       ofloxacin (FLOXIN) 0.3 % otic solution        zoledronic acid (ZOMETA) 4 MG/100ML SOLN Inject 4 mg into the vein once       No current facility-administered medications for this visit.              Review of Systems:   A comprehensive 10 point review of systems (constitutional, ENT, cardiac, peripheral vascular, lymphatic, respiratory, GI, , Musculoskeletal, skin, Neurological) was performed and found to be negative except as described in this note.     See intake form completed by patient    Small Joint Injection/Arthrocentesis: L index DIP    Date/Time:  2/17/2025 9:50 AM    Performed by: Ulises Barker MD  Authorized by: Ulises Barker MD    Needle Size:  21 G  Guidance: landmark     Approach:  Ulnar  Location:  Index finger    Site:  L index DIP                    Medications:  20 mg methylPREDNISolone 40 MG/ML; 0.5 mL lidocaine (PF) 1 %  Aspirate amount (mL):  1    Aspirate:  Clear      Outcome:  Tolerated well, no immediate complications  Procedure discussed: discussed risks, benefits, and alternatives    Consent Given by:  Patient  Timeout: timeout called immediately prior to procedure    Prep: patient was prepped and draped in usual sterile fashion             Again, thank you for allowing me to participate in the care of your patient.        Sincerely,        Ulises Barker MD    Electronically signed

## 2025-02-17 NOTE — NURSING NOTE
[facility] [address] [address] [phone]  Dept: [phone]  ______________________________________________________________________________    Patient: Deb Nelson   : 1945   MRN: [id number]   2025    INVASIVE PROCEDURE SAFETY CHECKLIST    Date: 2025   Procedure:Left index finger mass aspiration and steroid injection  Patient Name: Deb Nelson  MRN: [id number]  YOB: 1945    Action: Complete sections as appropriate. Any discrepancy results in a HARD COPY until resolved.     PRE PROCEDURE:  Patient ID verified with 2 identifiers (name and  or MRN): Yes  Procedure and site verified with patient/designee (when able): Yes  Accurate consent documentation in medical record: Yes  H&P (or appropriate assessment) documented in medical record: NA  H&P must be up to 20 days prior to procedure and updates within 24 hours of procedure as applicable: NA  Relevant diagnostic and radiology test results appropriately labeled and displayed as applicable: Yes  Procedure site(s) marked with provider initials: NA    TIMEOUT:  Time-Out performed immediately prior to starting procedure, including verbal and active participation of all team members addressing the following:Yes  * Correct patient identify  * Confirmed that the correct side and site are marked  * An accurate procedure consent form  * Agreement on the procedure to be done  * Correct patient position  * Relevant images and results are properly labeled and appropriately displayed  * The need to administer antibiotics or fluids for irrigation purposes during the procedure as applicable   * Safety precautions based on patient history or medication use    DURING PROCEDURE: Verification of correct person, site, and procedures any time the responsibility for care of the patient is transferred to another member of the care team.       The  following medications were given:         Prior to injection, verified patient identity using patient's name and date of birth.  Due to injection administration, patient instructed to remain in clinic for 15 minutes  afterwards, and to report any adverse reaction to me immediately.    Aspiration and injection was removed  Medication Name: Lidocaine NDC 41536-599-95  Drug Amount Wasted:  Yes: 4.5 mg/ml   Vial/Syringe: Single dose vial  Expiration Date:  07/2028    Medication Name: Depomedrol NDC 4974-7945-01  Drug Amount Wasted:  Yes: 0.5 mg/ml   Vial/Syringe: Single dose vial  Expiration Date:  05/2026    Scribed by Natalia Bhat ATC for Dr. Barker on February 17, 2025 at 9:40a based on the provider's statements to me.     Natalia Bhat ATC

## 2025-05-02 NOTE — PROGRESS NOTES
ASSESSMENT  This is a 79-year-old postmenopausal female with osteoporosis by original T-scores and history of a fragility fracture.  She did not tolerate oral alendronate.  Subsequently she had 3 IV Reclast infusions in 2021, 2022, and 7/20/2023.  Her DEXA in 2023 showed osteopenia and we decided then to take a drug holiday.  Her DEXA is due in July.  Depending on the results we will decide if we can wait another year for IV Reclast or we will proceed with IV Reclast.  We talked about calcium, and vitamin D.  She is getting exercise.  I will see her after the next DEXA.    Her blood pressure was up today.  She reports that it is normal at home.  She is not on any bp medication.  This should be followed.    PLAN:  Patient should take 1200mg of calcium/day in divided doses (all foods and supplements combined)and vitamin D3 1000IU/day.    You only absorb 5-600mg of calcium pills at a time    Get your DXA in July    See me in August      Watch your blood pressure - goal is <130/80       Thank you for allowing me to participate in the care of your patient.  Please do not hesitate to call with questions or concerns.    Sincerely,    Jie Isabel MD, PhD  CC Den Crespo MD     Time note (e4, 30'): The total of my time (on the date of service) for this service was 30 minutes, including discussion/face-to-face, chart review, interpretation not otherwise reported, documentation, and updating of the computerized record.          Deb is a  79 year old female post menopausal] [GR0, P0] that presents today for osteoporosis follow up patient was last seen 7/2024. She has osteoporosis by original T-scores and history of fragility fracture. She initially tried oral alendronate but did not tolerate it. Subsequently she had 3 IV Reclast infusions in 2021, 2022, and 7/2023. Her most recent DEXA in July 2023 shows osteopenia. We discussed calcium and vitamin D. We will hold on further IV Reclast since she has had 3 infusions.  Will get another DEXA next summer   Referring Physician: Referred Den Crespo MD     HPI     Have you ever had a bone density test? Yes  Where = [facility] - different machine  When = 7/2023 ([facility])  8/2020 ([facility])   2014   Spine Tscore = L1-2  -1.0  Left neck Tscore = -0.9  Total left hip Tscore = NA  Right neck Tscore = -1.6  Total Right hip Tscore = NA   Have you received any x-ray dye or contrast in the last ten days? No  How many servings of dairy products do you consume per day? 2 Type: milk cheese  green vegetables   Do you take a multi-vitamin daily? Yes  no calcium   Do you take a vitamin D supplement? No  Do you take a calcium supplement daily?  Calcium carbonate  TUMS at at bedtime   gummies 500mg with vit D  - takes 2 at a time    Do you take a supplement containing strontium? No  Are you exposed to natural sunlight at least 20 minutes three times a week? Yes     Social History   reports that she quit smoking about 39 years ago. Her smoking use included cigarettes. She started smoking about 64 years ago. She has a 51.1 pack-year smoking history. She has never used smokeless tobacco. She reports current alcohol use of about 1.7 standard drinks of alcohol per week. She reports that she does not use drugs.  Do you smoke cigarettes? Reformed smoker  Do you exercise? Yes. Details: walks 3-5 mi/day - 5-6times/wk - yoga   Do you drink alcohol? Yes. Details: gin and tonic every night      Medication History  Have you used any of the following medications?               Aredia (Pamidronate): No              Boniva (Ibindronate): 15 yrs ago  For about 5 yrs - and good response and stopped it               Didronil (Etidronate): No              Evista (Raloxifene): No              Fosamax (Alendronate): 2 months stopped last Nov 2020  - had severe heartburn              Forteo (Parathyroid hormone) injections: No              HCTZ (Thiazide): No              Calcitonin nasal spray: No              Reclast or  "Zometa (Zolendronate): 2021,  2022 , 2023               Prolia (Denosumab): No                Current Outpatient Medications   Medication Sig Dispense Refill    Acetaminophen (TYLENOL PO) Take 325 mg by mouth PRN      mvw complete formulation (CHEWABLES) tablet Take 1 tablet by mouth daily Tums   1 tablet daily      ofloxacin (FLOXIN) 0.3 % otic solution       zoledronic acid (ZOMETA) 4 MG/100ML SOLN Inject 4 mg into the vein once            Allergies   Allergen Reactions    Clindamycin Itching and Rash       Past Medical History    Family History   Problem Relation Age of Onset    Colon Cancer Mother     Breast Cancer Mother     Uterine Cancer Mother     Osteoporosis Mother     Dementia Mother     Melanoma Sister     Cerebrovascular Disease Sister 80    Osteoporosis Sister     Coronary Artery Disease Sister 80        EF 50%, s/p ICD    Chronic Obstructive Pulmonary Disease Sister         smoker    Thyroid Disease Sister         not sure which type    Coronary Artery Disease Sister 75        s/p PCI/Federico    Osteoporosis Sister     Cerebrovascular Disease Sister 80    Coronary Artery Disease Sister 80        s/p PCI/DESEAN    Dementia Brother 82    Coronary Artery Disease Brother 80        s/p MI, s/p PCI/DESEAN, s/p ICD    Hypertension Brother     Mental Illness Brother         Schizophrenia/PTSD post [place],  from [facility]    Psychotic Disorder Other         mother, depression; father schizophrenia    Skin Cancer No family hx of        ROS:  General: none  Head/Eyes: none  Ears/Nose/Throat: none  Cardiovascular: blood pressure was up  - reports bpis good at home   Respiratory: none  Gastrointestinal: none  Breast: none  Genitourinary: none  Sexual Function: none  Musculoskeletal: joint pain  Skin: none  Neurological: none  Mental Health: none  Endocrine: none    Clinic Measurements  Vitals: BP (!) 152/88   Pulse 65   Ht 1.705 m (5' 7.13\")   Wt 71.3 kg (157 lb 1.6 oz)   BMI 24.51 kg/m  "   BMI= Body mass index is 24.51 kg/m .    Physical exam  Constitutional: Well appearing woman in no acute distress.   Psychological: appropriate mood.  Neck: No thyroidmegaly.    Cardiovascular: regular rate and rhythm, normal S1 and S2, no murmurs, rubs or gallops,  Respiratory: clear to auscultation, no wheezes or crackles, normal breath sounds.  Musculoskeletal: good  range of motion, no edema, and motor strength is equal in the upper and lower extremities    Spine: Straight, not tender, Flexion good, Extension good, Lateral movement good, Rotational movement good  Skin: no concerning lesions, no jaundice.  Neurological: cranial nerves intact, normal strength, reflexes at patella and biceps normal, normal gait, no tremor.     LAB  Vertebra; Fracture Assessment: NA  Dexa Scan: 7/2023     FRAX Assessment Tool: [N/A, on IV reclast   Risk Factors: age. PM, Tscores, reformed smoker  +FHx       Jie Isabel MD, PhD

## 2025-05-05 ENCOUNTER — OFFICE VISIT (OUTPATIENT)
Dept: FAMILY MEDICINE | Facility: CLINIC | Age: 80
End: 2025-05-05
Attending: FAMILY MEDICINE
Payer: MEDICARE

## 2025-05-05 VITALS
DIASTOLIC BLOOD PRESSURE: 88 MMHG | WEIGHT: 157.1 LBS | HEART RATE: 65 BPM | SYSTOLIC BLOOD PRESSURE: 152 MMHG | BODY MASS INDEX: 24.66 KG/M2 | HEIGHT: 67 IN

## 2025-05-05 DIAGNOSIS — M81.0 SENILE OSTEOPOROSIS: Primary | ICD-10-CM

## 2025-05-05 PROCEDURE — G0463 HOSPITAL OUTPT CLINIC VISIT: HCPCS | Performed by: FAMILY MEDICINE

## 2025-05-05 RX ORDER — FAMOTIDINE 20 MG
TABLET ORAL
COMMUNITY

## 2025-05-05 NOTE — PATIENT INSTRUCTIONS
Patient should take 1200mg of calcium/day in divided doses (all foods and supplements combined)and vitamin D3 1000IU/day.    You only absorb 5-600mg of calcium pills at a time    Get your DXA in July get at same facility as in 7/2023 - [facility]     See me in August              Thank you for trusting us with your care!   Please be aware, if you are on Mychart, you may see your results prior to your providers review. If labs are abnormal, we will call or message you on [employer] with a follow up plan.    If you need to contact us for questions about:  Symptoms, Scheduling & Medical Questions; Non-urgent (2-3 day response) [employer] message, Urgent (needing response today) [phone] (if after 3:30pm next day response)   Prescriptions: Please call your Pharmacy   Billing: [facility] [phone] or [facility]:[phone]

## 2025-06-25 ENCOUNTER — OFFICE VISIT (OUTPATIENT)
Dept: FAMILY MEDICINE | Facility: CLINIC | Age: 80
End: 2025-06-25
Payer: COMMERCIAL

## 2025-06-25 VITALS
HEART RATE: 75 BPM | SYSTOLIC BLOOD PRESSURE: 131 MMHG | OXYGEN SATURATION: 100 % | TEMPERATURE: 97.8 F | HEIGHT: 67 IN | DIASTOLIC BLOOD PRESSURE: 84 MMHG | WEIGHT: 151.4 LBS | RESPIRATION RATE: 14 BRPM | BODY MASS INDEX: 23.76 KG/M2

## 2025-06-25 DIAGNOSIS — Z11.59 NEED FOR HEPATITIS C SCREENING TEST: Primary | ICD-10-CM

## 2025-06-25 DIAGNOSIS — Z00.00 ENCOUNTER FOR MEDICARE ANNUAL WELLNESS EXAM: ICD-10-CM

## 2025-06-25 LAB — HCV AB SERPL QL IA: NONREACTIVE

## 2025-06-25 SDOH — HEALTH STABILITY: PHYSICAL HEALTH: ON AVERAGE, HOW MANY MINUTES DO YOU ENGAGE IN EXERCISE AT THIS LEVEL?: 60 MIN

## 2025-06-25 SDOH — HEALTH STABILITY: PHYSICAL HEALTH: ON AVERAGE, HOW MANY DAYS PER WEEK DO YOU ENGAGE IN MODERATE TO STRENUOUS EXERCISE (LIKE A BRISK WALK)?: 5 DAYS

## 2025-06-25 ASSESSMENT — SOCIAL DETERMINANTS OF HEALTH (SDOH): HOW OFTEN DO YOU GET TOGETHER WITH FRIENDS OR RELATIVES?: THREE TIMES A WEEK

## 2025-06-25 NOTE — PROGRESS NOTES
Preventive Care Visit  [facility] ERMELINDA Crespo MD, Family Medicine  Jun 25, 2025          Janine Boyd is a 79 year old, presenting for the following:  Wellness Visit        6/25/2025     1:01 PM   Additional Questions   Roomed by John         6/25/2025    Information    services provided? No          HPI           Advance Care Planning    Patient states has Health Care Directive and will send to [employer].        6/25/2025   General Health   How would you rate your overall physical health? Good   Feel stress (tense, anxious, or unable to sleep) Only a little   (!) STRESS CONCERN      6/25/2025   Nutrition   Diet: Regular (no restrictions)         6/25/2025   Exercise   Days per week of moderate/strenous exercise 5 days   Average minutes spent exercising at this level 60 min         6/25/2025   Social Factors   Frequency of gathering with friends or relatives Three times a week   Worry food won't last until get money to buy more No   Food not last or not have enough money for food? No   Do you have housing? (Housing is defined as stable permanent housing and does not include staying outside in a car, in a tent, in an abandoned building, in an overnight shelter, or couch-surfing.) Yes   Are you worried about losing your housing? No   Lack of transportation? No   Unable to get utilities (heat,electricity)? No         6/25/2025   Fall Risk   Fallen 2 or more times in the past year? No   Trouble with walking or balance? No          6/25/2025   Activities of Daily Living- Home Safety   Needs help with the following daily activites None of the above   Safety concerns in the home None of the above         6/25/2025   Dental   Dentist two times every year? Yes         6/25/2025   Hearing Screening   Hearing concerns? (!) I FEEL THAT PEOPLE ARE MUMBLING OR NOT SPEAKING CLEARLY.    (!) I NEED TO ASK PEOPLE TO SPEAK UP OR REPEAT THEMSELVES.    (!) TROUBLE UNDERSTANDING  SOFT OR WHISPERED SPEECH.   Would you like a referral for hearing testing? I already have hearing aids       Multiple values from one day are sorted in reverse-chronological order         2025   Driving Risk Screening   Patient/family members have concerns about driving No         2025   General Alertness/Fatigue Screening   Have you been more tired than usual lately? No         2025   Urinary Incontinence Screening   Bothered by leaking urine in past 6 months No           Today's PHQ-2 Score:       2025     8:51 AM   PHQ-2 (  [employer])   Q1: Little interest or pleasure in doing things 0   Q2: Feeling down, depressed or hopeless 0   PHQ-2 Score 0         2025   Substance Use   Alcohol more than 3/day or more than 7/wk No   Do you have a current opioid prescription? No   How severe/bad is pain from 1 to 10? 2/10   Do you use any other substances recreationally? No     Social History     Tobacco Use    Smoking status: Former     Current packs/day: 0.00     Average packs/day: 2.0 packs/day for 25.6 years (51.1 ttl pk-yrs)     Types: Cigarettes     Start date: 1960     Quit date: 1985     Years since quittin.9    Smokeless tobacco: Never   Vaping Use    Vaping status: Never Used   Substance Use Topics    Alcohol use: Yes     Alcohol/week: 1.7 standard drinks of alcohol     Comment: 7 per week    Drug use: No              ASCVD Risk   The 10-year ASCVD risk score (Ernst DENSON, et al., 2019) is: 27.1%    Values used to calculate the score:      Age: 79 years      Sex: Female      Is Non- : No      Diabetic: No      Tobacco smoker: No      Systolic Blood Pressure: 131 mmHg      Is BP treated: No      HDL Cholesterol: 93 mg/dL      Total Cholesterol: 206 mg/dL            Reviewed and updated as needed this visit by Provider   Tobacco     Med Hx  Surg Hx  Fam Hx  Soc Hx Sexual Activity            Current providers sharing in care for this patient  "include:  Patient Care Team:  Den Crespo MD as PCP - General (Family Medicine)  Jie Isabel MD PhD as MD (Family Medicine)  Lilia Aguilar RP as Pharmacist (Pharmacist)  Mikki Dumont APRN CNP as Nurse Practitioner (Cardiovascular Disease)  Ofe Suero PA-C as Physician Assistant (Gastroenterology)  Donna Gandhi RPH as Pharmacist (Pharmacist)  Kaylen Bañuelos PA-C as Physician Assistant (Dermatology)  Kaylen Bañuelos PA-C as Physician Assistant (Dermatology)  Ulises Barker MD as Assigned Musculoskeletal Provider  Kaylen Bañuelos PA-C as Assigned Dermatology Provider  Jie Isabel MD PhD as Assigned PCP    The following health maintenance items are reviewed in Epic and correct as of today:  Health Maintenance   Topic Date Due    HEPATITIS C SCREENING  Never done    ZOSTER VACCINE (3 of 3) 12/19/2022    COLORECTAL CANCER SCREENING  06/17/2026    MEDICARE ANNUAL WELLNESS VISIT  06/25/2026    FALL RISK ASSESSMENT  06/25/2026    ADVANCE CARE PLANNING  07/01/2026    DIABETES SCREENING  07/10/2026    DTAP/TDAP/TD VACCINE (4 - Td or Tdap) 11/02/2026    LIPID  07/10/2028    DEXA  07/05/2038    PHQ-2 (once per calendar year)  Completed    INFLUENZA VACCINE  Completed    PNEUMOCOCCAL VACCINE 50+ YEARS  Completed    RSV VACCINE  Completed    COVID-19 VACCINE  Completed    HPV VACCINE  Aged Out    MENINGITIS VACCINE  Aged Out    MAMMO SCREENING  Discontinued            Objective    Exam  /84   Pulse 75   Temp 97.8  F (36.6  C) (Temporal)   Resp 14   Ht 1.705 m (5' 7.13\")   Wt 68.7 kg (151 lb 6.4 oz)   SpO2 100%   BMI 23.62 kg/m     Estimated body mass index is 23.62 kg/m  as calculated from the following:    Height as of this encounter: 1.705 m (5' 7.13\").    Weight as of this encounter: 68.7 kg (151 lb 6.4 oz).    Physical Exam  Constitutional:       General: She is not in acute distress.     Appearance: She is normal weight. She is not toxic-appearing. "   HENT:      Head: Normocephalic.      Right Ear: There is no impacted cerumen.      Left Ear: There is no impacted cerumen.      Ears:      Comments: Wears HAs  Cardiovascular:      Rate and Rhythm: Regular rhythm.      Heart sounds: Normal heart sounds.   Pulmonary:      Breath sounds: Normal breath sounds.   Abdominal:      General: Abdomen is flat. There is no distension.      Palpations: There is no mass.   Musculoskeletal:         General: Normal range of motion.      Cervical back: No rigidity.      Right lower leg: No edema.      Left lower leg: No edema.   Lymphadenopathy:      Cervical: No cervical adenopathy.   Neurological:      General: No focal deficit present.      Mental Status: She is alert.   Psychiatric:         Mood and Affect: Mood normal.         Behavior: Behavior normal.               6/25/2025   Mini Cog   Clock Draw Score 2 Normal   3 Item Recall 3 objects recalled   Mini Cog Total Score 5            Deb was seen today for wellness visit.    Diagnoses and all orders for this visit:    Need for hepatitis C screening test  -     Hepatitis C Screen Reflex to HCV RNA Quant and Genotype; Future  -     Hepatitis C Screen Reflex to HCV RNA Quant and Genotype    Encounter for Medicare annual wellness exam.  Up-to-date on all vaccinations.  Due for hepatitis C screening test.  She has stopped obtaining mammograms.  Consider a final colonoscopy at age 84-85 if life expectancy is good.  Getting a follow-up DEXA scan in 1 month, the results of which will determine whether to resume bisphosphonate therapy.          Signed Electronically by: Den Crespo MD

## 2025-06-25 NOTE — PATIENT INSTRUCTIONS
Patient Education     Send in copy of Advance Care Directive.    Preventive Care Advice   This is general advice given by our system to help you stay healthy. However, your care team may have specific advice just for you. Please talk to your care team about your preventive care needs.  Nutrition  Eat 5 or more servings of fruits and vegetables each day.  Try wheat bread, brown rice and whole grain pasta (instead of white bread, rice, and pasta).  Get enough calcium and vitamin D. Check the label on foods and aim for 100% of the RDA (recommended daily allowance).  Lifestyle  Exercise at least 150 minutes each week  (30 minutes a day, 5 days a week).  Do muscle strengthening activities 2 days a week. These help control your weight and prevent disease.  No smoking.  Wear sunscreen to prevent skin cancer.  Have a dental exam and cleaning every 6 months.  Yearly exams  See your health care team every year to talk about:  Any changes in your health.  Any medicines your care team has prescribed.  Preventive care, family planning, and ways to prevent chronic diseases.  Shots (vaccines)   HPV shots (up to age 26), if you've never had them before.  Hepatitis B shots (up to age 59), if you've never had them before.  COVID-19 shot: Get this shot when it's due.  Flu shot: Get a flu shot every year.  Tetanus shot: Get a tetanus shot every 10 years.  Pneumococcal, hepatitis A, and RSV shots: Ask your care team if you need these based on your risk.  Shingles shot (for age 50 and up)  General health tests  Diabetes screening:  Starting at age 35, Get screened for diabetes at least every 3 years.  If you are younger than age 35, ask your care team if you should be screened for diabetes.  Cholesterol test: At age 39, start having a cholesterol test every 5 years, or more often if advised.  Bone density scan (DEXA): At age 50, ask your care team if you should have this scan for osteoporosis (brittle bones).  Hepatitis C: Get tested at  least once in your life.  STIs (sexually transmitted infections)  Before age 24: Ask your care team if you should be screened for STIs.  After age 24: Get screened for STIs if you're at risk. You are at risk for STIs (including HIV) if:  You are sexually active with more than one person.  You don't use condoms every time.  You or a partner was diagnosed with a sexually transmitted infection.  If you are at risk for HIV, ask about PrEP medicine to prevent HIV.  Get tested for HIV at least once in your life, whether you are at risk for HIV or not.  Cancer screening tests  Cervical cancer screening: If you have a cervix, begin getting regular cervical cancer screening tests starting at age 21.  Breast cancer scan (mammogram): If you've ever had breasts, begin having regular mammograms starting at age 40. This is a scan to check for breast cancer.  Colon cancer screening: It is important to start screening for colon cancer at age 45.  Have a colonoscopy test every 10 years (or more often if you're at risk) Or, ask your provider about stool tests like a FIT test every year or Cologuard test every 3 years.  To learn more about your testing options, visit:   .  For help making a decision, visit:   https://bit.ly/pd72555.  Prostate cancer screening test: If you have a prostate, ask your care team if a prostate cancer screening test (PSA) at age 55 is right for you.  Lung cancer screening: If you are a current or former smoker ages 50 to 80, ask your care team if ongoing lung cancer screenings are right for you.  For informational purposes only. Not to replace the advice of your health care provider. Copyright   2023 [facility]. All rights reserved. Clinically reviewed by the [facility] Transitions Program. Link To Media 165591 - REV 01/24.  Hearing Loss: Care Instructions  Overview     Hearing loss is a sudden or slow decrease in how well you hear. It can range from slight to profound. Permanent hearing  loss can occur with aging. It also can happen when you are exposed long-term to loud noise. Examples include listening to loud music, riding motorcycles, or being around other loud machines.  Hearing loss can affect your work and home life. It can make you feel lonely or depressed. You may feel that you have lost your independence. But hearing aids and other devices can help you hear better and feel connected to others.  Follow-up care is a key part of your treatment and safety. Be sure to make and go to all appointments, and call your doctor if you are having problems. It's also a good idea to know your test results and keep a list of the medicines you take.  How can you care for yourself at home?  Avoid loud noises whenever possible. This helps keep your hearing from getting worse.  Always wear hearing protection around loud noises.  Wear a hearing aid as directed.  A professional can help you pick a hearing aid that will work best for you.  You can also get hearing aids over the counter for mild to moderate hearing loss.  Have hearing tests as your doctor suggests. They can show whether your hearing has changed. Your hearing aid may need to be adjusted.  Use other devices as needed. These may include:  Telephone amplifiers and hearing aids that can connect to a television, stereo, radio, or microphone.  Devices that use lights or vibrations. These alert you to the doorbell, a ringing telephone, or a baby monitor.  Television closed-captioning. This shows the words at the bottom of the screen. Most new TVs can do this.  TTY (text telephone). This lets you type messages back and forth on the telephone instead of talking or listening. These devices are also called TDD. When messages are typed on the keyboard, they are sent over the phone line to a receiving TTY. The message is shown on a monitor.  Use text messaging, social media, and email if it is hard for you to communicate by telephone.  Try to learn a listening  "technique called speechreading. It is not lipreading. You pay attention to people's gestures, expressions, posture, and tone of voice. These clues can help you understand what a person is saying. Face the person you are talking to, and have them face you. Make sure the lighting is good. You need to see the other person's face clearly.  Think about counseling if you need help to adjust to your hearing loss.  When should you call for help?  Watch closely for changes in your health, and be sure to contact your doctor if:    You think your hearing is getting worse.     You have new symptoms, such as dizziness or nausea.   Where can you learn more?  Go to https://www.[employer].net/patiented  Enter R798 in the search box to learn more about \"Hearing Loss: Care Instructions.\"  Current as of: October 27, 2024  Content Version: 14.5 2024-2025 [employer].   Care instructions adapted under license by your healthcare professional. If you have questions about a medical condition or this instruction, always ask your healthcare professional. [employer] disclaims any warranty or liability for your use of this information.       "

## 2025-06-26 ENCOUNTER — RESULTS FOLLOW-UP (OUTPATIENT)
Dept: FAMILY MEDICINE | Facility: CLINIC | Age: 80
End: 2025-06-26

## 2025-07-22 ENCOUNTER — ANCILLARY PROCEDURE (OUTPATIENT)
Dept: BONE DENSITY | Facility: CLINIC | Age: 80
End: 2025-07-22
Attending: FAMILY MEDICINE
Payer: MEDICARE

## 2025-07-22 DIAGNOSIS — M81.0 SENILE OSTEOPOROSIS: ICD-10-CM

## 2025-07-22 PROCEDURE — 77081 DXA BONE DENSITY APPENDICULR: CPT | Mod: TC | Performed by: PHYSICIAN ASSISTANT

## 2025-07-22 PROCEDURE — 77080 DXA BONE DENSITY AXIAL: CPT | Mod: TC | Performed by: PHYSICIAN ASSISTANT

## 2025-07-23 ENCOUNTER — DOCUMENTATION ONLY (OUTPATIENT)
Dept: OTHER | Facility: CLINIC | Age: 80
End: 2025-07-23
Payer: MEDICARE

## 2025-08-18 ENCOUNTER — OFFICE VISIT (OUTPATIENT)
Dept: FAMILY MEDICINE | Facility: CLINIC | Age: 80
End: 2025-08-18
Attending: FAMILY MEDICINE
Payer: MEDICARE

## 2025-08-18 VITALS
BODY MASS INDEX: 22.85 KG/M2 | HEART RATE: 60 BPM | WEIGHT: 145.6 LBS | DIASTOLIC BLOOD PRESSURE: 82 MMHG | SYSTOLIC BLOOD PRESSURE: 150 MMHG | HEIGHT: 67 IN

## 2025-08-18 DIAGNOSIS — M81.0 SENILE OSTEOPOROSIS: Primary | ICD-10-CM

## 2025-08-18 PROCEDURE — 99213 OFFICE O/P EST LOW 20 MIN: CPT | Performed by: FAMILY MEDICINE

## 2025-08-18 PROCEDURE — G0463 HOSPITAL OUTPT CLINIC VISIT: HCPCS | Performed by: FAMILY MEDICINE

## 2025-08-18 PROCEDURE — 3077F SYST BP >= 140 MM HG: CPT | Performed by: FAMILY MEDICINE

## 2025-08-18 PROCEDURE — 3079F DIAST BP 80-89 MM HG: CPT | Performed by: FAMILY MEDICINE

## (undated) DEVICE — ENDO BITE BLOCK ADULT OMNI-BLOC

## (undated) DEVICE — GOWN IMPERVIOUS 2XL BLUE

## (undated) DEVICE — SYR 30ML SLIP TIP W/O NDL 302833

## (undated) DEVICE — TUBING SUCTION MEDI-VAC 1/4"X20' N620A

## (undated) DEVICE — GLOVE EXAM NITRILE LG PF LATEX FREE 5064

## (undated) DEVICE — KIT ENDO TURNOVER/PROCEDURE CARRY-ON 101822

## (undated) DEVICE — SOL WATER IRRIG 500ML BOTTLE 2F7113

## (undated) DEVICE — SUCTION MANIFOLD NEPTUNE 2 SYS 1 PORT 702-025-000

## (undated) RX ORDER — LIDOCAINE HYDROCHLORIDE 20 MG/ML
JELLY TOPICAL
Status: DISPENSED
Start: 2023-12-12

## (undated) RX ORDER — TRIAMCINOLONE ACETONIDE 40 MG/ML
INJECTION, SUSPENSION INTRA-ARTICULAR; INTRAMUSCULAR
Status: DISPENSED
Start: 2023-12-12

## (undated) RX ORDER — FENTANYL CITRATE 50 UG/ML
INJECTION, SOLUTION INTRAMUSCULAR; INTRAVENOUS
Status: DISPENSED
Start: 2021-06-17

## (undated) RX ORDER — LIDOCAINE HYDROCHLORIDE 10 MG/ML
INJECTION, SOLUTION EPIDURAL; INFILTRATION; INTRACAUDAL; PERINEURAL
Status: DISPENSED
Start: 2025-02-17

## (undated) RX ORDER — BUPIVACAINE HYDROCHLORIDE 2.5 MG/ML
INJECTION, SOLUTION EPIDURAL; INFILTRATION; INTRACAUDAL
Status: DISPENSED
Start: 2023-12-12

## (undated) RX ORDER — METHYLPREDNISOLONE ACETATE 40 MG/ML
INJECTION, SUSPENSION INTRA-ARTICULAR; INTRALESIONAL; INTRAMUSCULAR; SOFT TISSUE
Status: DISPENSED
Start: 2025-02-17